# Patient Record
Sex: MALE | Race: WHITE | NOT HISPANIC OR LATINO
[De-identification: names, ages, dates, MRNs, and addresses within clinical notes are randomized per-mention and may not be internally consistent; named-entity substitution may affect disease eponyms.]

---

## 2017-01-31 ENCOUNTER — RX RENEWAL (OUTPATIENT)
Age: 57
End: 2017-01-31

## 2017-03-20 ENCOUNTER — APPOINTMENT (OUTPATIENT)
Dept: INTERNAL MEDICINE | Facility: CLINIC | Age: 57
End: 2017-03-20

## 2017-03-20 ENCOUNTER — LABORATORY RESULT (OUTPATIENT)
Age: 57
End: 2017-03-20

## 2017-03-20 ENCOUNTER — OTHER (OUTPATIENT)
Age: 57
End: 2017-03-20

## 2017-03-20 ENCOUNTER — RESULT CHARGE (OUTPATIENT)
Age: 57
End: 2017-03-20

## 2017-03-20 VITALS — BODY MASS INDEX: 29.78 KG/M2 | WEIGHT: 192 LBS | HEIGHT: 67.5 IN

## 2017-03-20 VITALS — SYSTOLIC BLOOD PRESSURE: 159 MMHG | DIASTOLIC BLOOD PRESSURE: 90 MMHG | HEART RATE: 61 BPM

## 2017-03-21 LAB
ALBUMIN SERPL ELPH-MCNC: 4.6 G/DL
ALP BLD-CCNC: 74 U/L
ALT SERPL-CCNC: 19 U/L
ANION GAP SERPL CALC-SCNC: 20 MMOL/L
AST SERPL-CCNC: 34 U/L
BASOPHILS # BLD AUTO: 0 K/UL
BASOPHILS NFR BLD AUTO: 0 %
BILIRUB SERPL-MCNC: 0.4 MG/DL
BUN SERPL-MCNC: 25 MG/DL
CALCIUM SERPL-MCNC: 9.9 MG/DL
CHLORIDE SERPL-SCNC: 103 MMOL/L
CO2 SERPL-SCNC: 16 MMOL/L
CREAT SERPL-MCNC: 1.27 MG/DL
EOSINOPHIL # BLD AUTO: 0 K/UL
EOSINOPHIL NFR BLD AUTO: 0 %
GLUCOSE SERPL-MCNC: 74 MG/DL
HCT VFR BLD CALC: 45 %
HETEROPH AB SER QL: NEGATIVE
HGB BLD-MCNC: 14.6 G/DL
LYMPHOCYTES # BLD AUTO: 70.27 K/UL
LYMPHOCYTES NFR BLD AUTO: 80 %
MAN DIFF?: YES
MCHC RBC-ENTMCNC: 28.5 PG
MCHC RBC-ENTMCNC: 32.4 GM/DL
MCV RBC AUTO: 87.9 FL
MONOCYTES # BLD AUTO: 0.88 K/UL
MONOCYTES NFR BLD AUTO: 1 %
NEUTROPHILS # BLD AUTO: 12.3 K/UL
NEUTROPHILS NFR BLD AUTO: 14 %
PLATELET # BLD AUTO: 181 K/UL
POTASSIUM SERPL-SCNC: 4.5 MMOL/L
PROT SERPL-MCNC: 7.8 G/DL
RBC # BLD: 5.12 M/UL
RBC # FLD: 14.4 %
SODIUM SERPL-SCNC: 139 MMOL/L
WBC # FLD AUTO: 87.84 K/UL

## 2017-03-22 ENCOUNTER — FORM ENCOUNTER (OUTPATIENT)
Age: 57
End: 2017-03-22

## 2017-03-23 ENCOUNTER — APPOINTMENT (OUTPATIENT)
Dept: CT IMAGING | Facility: CLINIC | Age: 57
End: 2017-03-23

## 2017-03-23 ENCOUNTER — OUTPATIENT (OUTPATIENT)
Dept: OUTPATIENT SERVICES | Facility: HOSPITAL | Age: 57
LOS: 1 days | End: 2017-03-23
Payer: COMMERCIAL

## 2017-03-23 DIAGNOSIS — C91.10 CHRONIC LYMPHOCYTIC LEUKEMIA OF B-CELL TYPE NOT HAVING ACHIEVED REMISSION: ICD-10-CM

## 2017-03-23 PROCEDURE — 74177 CT ABD & PELVIS W/CONTRAST: CPT | Mod: 26

## 2017-03-23 PROCEDURE — 71260 CT THORAX DX C+: CPT

## 2017-03-23 PROCEDURE — 71260 CT THORAX DX C+: CPT | Mod: 26

## 2017-03-23 PROCEDURE — 74177 CT ABD & PELVIS W/CONTRAST: CPT

## 2017-03-30 ENCOUNTER — APPOINTMENT (OUTPATIENT)
Dept: HEMATOLOGY ONCOLOGY | Facility: CLINIC | Age: 57
End: 2017-03-30

## 2017-03-30 VITALS
RESPIRATION RATE: 14 BRPM | DIASTOLIC BLOOD PRESSURE: 82 MMHG | WEIGHT: 195 LBS | SYSTOLIC BLOOD PRESSURE: 138 MMHG | BODY MASS INDEX: 30.09 KG/M2 | OXYGEN SATURATION: 98 % | HEART RATE: 64 BPM | TEMPERATURE: 97.7 F

## 2017-03-31 LAB
ALBUMIN SERPL ELPH-MCNC: 4.5 G/DL
ALP BLD-CCNC: 79 U/L
ALT SERPL-CCNC: 34 U/L
ANION GAP SERPL CALC-SCNC: 14 MMOL/L
APPEARANCE: CLEAR
AST SERPL-CCNC: 36 U/L
B2 MICROGLOB SERPL-MCNC: 4.7 MG/L
BACTERIA: NEGATIVE
BILIRUB SERPL-MCNC: 0.2 MG/DL
BILIRUBIN URINE: NEGATIVE
BLOOD URINE: NEGATIVE
BUN SERPL-MCNC: 24 MG/DL
CALCIUM SERPL-MCNC: 9.6 MG/DL
CHLORIDE SERPL-SCNC: 103 MMOL/L
CO2 SERPL-SCNC: 22 MMOL/L
COLOR: YELLOW
CORE LAB FLUID CYTOLOGY: NORMAL
CREAT SERPL-MCNC: 1.24 MG/DL
DEPRECATED KAPPA LC FREE/LAMBDA SER: 2.01 RATIO
EPO SERPL-MCNC: 8 MIU/ML
GLUCOSE QUALITATIVE U: NORMAL MG/DL
GLUCOSE SERPL-MCNC: 88 MG/DL
IGA SER QL IEP: 215 MG/DL
IGG SER QL IEP: 1410 MG/DL
IGM SER QL IEP: 41 MG/DL
KAPPA LC CSF-MCNC: 1.09 MG/DL
KAPPA LC SERPL-MCNC: 2.19 MG/DL
KETONES URINE: NEGATIVE
LDH SERPL-CCNC: 315 U/L
LEUKOCYTE ESTERASE URINE: NEGATIVE
MICROSCOPIC-UA: NORMAL
NITRITE URINE: NEGATIVE
PH URINE: 6
POTASSIUM SERPL-SCNC: 4.7 MMOL/L
PROT SERPL-MCNC: 7.7 G/DL
PROTEIN URINE: NEGATIVE MG/DL
RED BLOOD CELLS URINE: 2 /HPF
SODIUM SERPL-SCNC: 139 MMOL/L
SPECIFIC GRAVITY URINE: 1.02
SQUAMOUS EPITHELIAL CELLS: 0 /HPF
URATE SERPL-MCNC: 6 MG/DL
UROBILINOGEN URINE: NORMAL MG/DL
WHITE BLOOD CELLS URINE: 0 /HPF

## 2017-04-03 LAB
ALBUMIN MFR SERPL ELPH: 60.4 %
ALBUMIN SERPL-MCNC: 4.7 G/DL
ALBUMIN/GLOB SERPL: 1.6 RATIO
ALPHA1 GLOB MFR SERPL ELPH: 3.8 %
ALPHA1 GLOB SERPL ELPH-MCNC: 0.3 G/DL
ALPHA2 GLOB MFR SERPL ELPH: 8.5 %
ALPHA2 GLOB SERPL ELPH-MCNC: 0.7 G/DL
B-GLOBULIN MFR SERPL ELPH: 10.5 %
B-GLOBULIN SERPL ELPH-MCNC: 0.8 G/DL
GAMMA GLOB FLD ELPH-MCNC: 1.3 G/DL
GAMMA GLOB MFR SERPL ELPH: 16.8 %
INTERPRETATION SERPL IEP-IMP: NORMAL
M PROTEIN SPEC IFE-MCNC: NORMAL
PROT SERPL-MCNC: 7.7 G/DL
PROT SERPL-MCNC: 7.7 G/DL

## 2017-04-04 ENCOUNTER — FORM ENCOUNTER (OUTPATIENT)
Age: 57
End: 2017-04-04

## 2017-04-05 ENCOUNTER — APPOINTMENT (OUTPATIENT)
Dept: MRI IMAGING | Facility: CLINIC | Age: 57
End: 2017-04-05

## 2017-04-05 ENCOUNTER — OUTPATIENT (OUTPATIENT)
Dept: OUTPATIENT SERVICES | Facility: HOSPITAL | Age: 57
LOS: 1 days | End: 2017-04-05

## 2017-04-05 DIAGNOSIS — Z00.8 ENCOUNTER FOR OTHER GENERAL EXAMINATION: ICD-10-CM

## 2017-04-24 ENCOUNTER — APPOINTMENT (OUTPATIENT)
Dept: HEMATOLOGY ONCOLOGY | Facility: CLINIC | Age: 57
End: 2017-04-24

## 2017-04-24 VITALS
HEART RATE: 57 BPM | DIASTOLIC BLOOD PRESSURE: 90 MMHG | SYSTOLIC BLOOD PRESSURE: 140 MMHG | BODY MASS INDEX: 30.55 KG/M2 | WEIGHT: 198 LBS | TEMPERATURE: 97.9 F | OXYGEN SATURATION: 98 % | RESPIRATION RATE: 14 BRPM

## 2017-04-25 LAB
ALBUMIN SERPL ELPH-MCNC: 4.3 G/DL
ALP BLD-CCNC: 78 U/L
ALT SERPL-CCNC: 24 U/L
ANION GAP SERPL CALC-SCNC: 12 MMOL/L
AST SERPL-CCNC: 34 U/L
BILIRUB SERPL-MCNC: 0.3 MG/DL
BUN SERPL-MCNC: 22 MG/DL
CALCIUM SERPL-MCNC: 9.2 MG/DL
CHLORIDE SERPL-SCNC: 103 MMOL/L
CO2 SERPL-SCNC: 23 MMOL/L
CREAT SERPL-MCNC: 1.15 MG/DL
GLUCOSE SERPL-MCNC: 95 MG/DL
LDH SERPL-CCNC: 332 U/L
POTASSIUM SERPL-SCNC: 5 MMOL/L
PROT SERPL-MCNC: 7.5 G/DL
SODIUM SERPL-SCNC: 138 MMOL/L
URATE SERPL-MCNC: 5.9 MG/DL

## 2017-05-08 ENCOUNTER — LABORATORY RESULT (OUTPATIENT)
Age: 57
End: 2017-05-08

## 2017-05-08 ENCOUNTER — APPOINTMENT (OUTPATIENT)
Dept: INTERNAL MEDICINE | Facility: CLINIC | Age: 57
End: 2017-05-08

## 2017-05-08 VITALS
HEART RATE: 70 BPM | DIASTOLIC BLOOD PRESSURE: 92 MMHG | WEIGHT: 192 LBS | SYSTOLIC BLOOD PRESSURE: 155 MMHG | HEIGHT: 67.5 IN | BODY MASS INDEX: 29.78 KG/M2

## 2017-05-08 DIAGNOSIS — Z87.01 PERSONAL HISTORY OF PNEUMONIA (RECURRENT): ICD-10-CM

## 2017-05-08 LAB — S PYO AG SPEC QL IA: NEGATIVE

## 2017-05-09 ENCOUNTER — OTHER (OUTPATIENT)
Age: 57
End: 2017-05-09

## 2017-05-09 LAB
ALBUMIN SERPL ELPH-MCNC: 4.2 G/DL
ALP BLD-CCNC: 90 U/L
ALT SERPL-CCNC: 20 U/L
ANION GAP SERPL CALC-SCNC: 16 MMOL/L
AST SERPL-CCNC: 54 U/L
BASOPHILS # BLD AUTO: 0 K/UL
BASOPHILS NFR BLD AUTO: 0 %
BILIRUB SERPL-MCNC: 0.3 MG/DL
BUN SERPL-MCNC: 22 MG/DL
CALCIUM SERPL-MCNC: 9.7 MG/DL
CHLORIDE SERPL-SCNC: 102 MMOL/L
CO2 SERPL-SCNC: 21 MMOL/L
CREAT SERPL-MCNC: 1.19 MG/DL
EOSINOPHIL # BLD AUTO: 1.22 K/UL
EOSINOPHIL NFR BLD AUTO: 1 %
GLUCOSE SERPL-MCNC: 79 MG/DL
HAV IGG+IGM SER QL: REACTIVE
HAV IGM SER QL: NONREACTIVE
HBV CORE IGM SER QL: NONREACTIVE
HBV SURFACE AB SER QL: REACTIVE
HBV SURFACE AG SER QL: NONREACTIVE
HBV SURFACE AG SER QL: NONREACTIVE
HCT VFR BLD CALC: 44.9 %
HCV AB SER QL: NONREACTIVE
HCV S/CO RATIO: 0.27 S/CO
HGB BLD-MCNC: 14.1 G/DL
LYMPHOCYTES # BLD AUTO: 69.57 K/UL
LYMPHOCYTES NFR BLD AUTO: 57 %
MAN DIFF?: NORMAL
MCHC RBC-ENTMCNC: 28 PG
MCHC RBC-ENTMCNC: 31.4 GM/DL
MCV RBC AUTO: 89.3 FL
MONOCYTES # BLD AUTO: 0 K/UL
MONOCYTES NFR BLD AUTO: 0 %
NEUTROPHILS # BLD AUTO: 7.32 K/UL
NEUTROPHILS NFR BLD AUTO: 6 %
PLATELET # BLD AUTO: 202 K/UL
POTASSIUM SERPL-SCNC: 4.7 MMOL/L
PROT SERPL-MCNC: 7.9 G/DL
RBC # BLD: 5.03 M/UL
RBC # FLD: 14.8 %
SODIUM SERPL-SCNC: 139 MMOL/L
WBC # FLD AUTO: 122.05 K/UL

## 2017-05-10 LAB
HCV RNA SERPL NAA DL=5-ACNC: NOT DETECTED IU/ML
HCV RNA SERPL NAA+PROBE-LOG IU: NOT DETECTED LOGIU/ML

## 2017-05-11 LAB — BACTERIA THROAT CULT: NORMAL

## 2017-05-16 ENCOUNTER — APPOINTMENT (OUTPATIENT)
Dept: INTERNAL MEDICINE | Facility: CLINIC | Age: 57
End: 2017-05-16

## 2017-05-16 ENCOUNTER — LABORATORY RESULT (OUTPATIENT)
Age: 57
End: 2017-05-16

## 2017-05-16 VITALS
HEART RATE: 69 BPM | DIASTOLIC BLOOD PRESSURE: 90 MMHG | HEIGHT: 68 IN | SYSTOLIC BLOOD PRESSURE: 158 MMHG | BODY MASS INDEX: 29.1 KG/M2 | WEIGHT: 192 LBS

## 2017-05-16 LAB — S PYO AG SPEC QL IA: NEGATIVE

## 2017-05-17 LAB
BASOPHILS # BLD AUTO: 0 K/UL
BASOPHILS NFR BLD AUTO: 0 %
EOSINOPHIL # BLD AUTO: 0 K/UL
EOSINOPHIL NFR BLD AUTO: 0 %
HCT VFR BLD CALC: 44.1 %
HGB BLD-MCNC: 13.7 G/DL
LYMPHOCYTES # BLD AUTO: 88.62 K/UL
LYMPHOCYTES NFR BLD AUTO: 71 %
MAN DIFF?: NORMAL
MCHC RBC-ENTMCNC: 27.9 PG
MCHC RBC-ENTMCNC: 31.1 GM/DL
MCV RBC AUTO: 89.8 FL
MONOCYTES # BLD AUTO: 2.5 K/UL
MONOCYTES NFR BLD AUTO: 2 %
NEUTROPHILS # BLD AUTO: 12.48 K/UL
NEUTROPHILS NFR BLD AUTO: 10 %
PLATELET # BLD AUTO: 261 K/UL
RBC # BLD: 4.91 M/UL
RBC # FLD: 15 %
WBC # FLD AUTO: 124.81 K/UL

## 2017-06-27 ENCOUNTER — APPOINTMENT (OUTPATIENT)
Dept: HEMATOLOGY ONCOLOGY | Facility: CLINIC | Age: 57
End: 2017-06-27

## 2017-06-27 VITALS
WEIGHT: 196 LBS | BODY MASS INDEX: 29.8 KG/M2 | RESPIRATION RATE: 14 BRPM | HEART RATE: 62 BPM | OXYGEN SATURATION: 98 % | DIASTOLIC BLOOD PRESSURE: 80 MMHG | TEMPERATURE: 97.7 F | SYSTOLIC BLOOD PRESSURE: 150 MMHG

## 2017-06-27 RX ORDER — AMOXICILLIN 500 MG/1
500 CAPSULE ORAL 3 TIMES DAILY
Qty: 15 | Refills: 1 | Status: DISCONTINUED | COMMUNITY
Start: 2017-05-08 | End: 2017-06-27

## 2017-06-28 LAB
ALBUMIN SERPL ELPH-MCNC: 4.1 G/DL
ALP BLD-CCNC: 83 U/L
ALT SERPL-CCNC: 24 U/L
ANION GAP SERPL CALC-SCNC: 12 MMOL/L
AST SERPL-CCNC: 34 U/L
BILIRUB SERPL-MCNC: 0.3 MG/DL
BUN SERPL-MCNC: 24 MG/DL
CALCIUM SERPL-MCNC: 8.9 MG/DL
CHLORIDE SERPL-SCNC: 105 MMOL/L
CO2 SERPL-SCNC: 22 MMOL/L
CREAT SERPL-MCNC: 1.2 MG/DL
GLUCOSE SERPL-MCNC: 102 MG/DL
LDH SERPL-CCNC: 332 U/L
POTASSIUM SERPL-SCNC: 4.9 MMOL/L
PROT SERPL-MCNC: 7.5 G/DL
SODIUM SERPL-SCNC: 139 MMOL/L
URATE SERPL-MCNC: 6.6 MG/DL

## 2017-07-06 ENCOUNTER — LABORATORY RESULT (OUTPATIENT)
Age: 57
End: 2017-07-06

## 2017-07-06 ENCOUNTER — RESULT REVIEW (OUTPATIENT)
Age: 57
End: 2017-07-06

## 2017-07-06 ENCOUNTER — FORM ENCOUNTER (OUTPATIENT)
Age: 57
End: 2017-07-06

## 2017-07-06 ENCOUNTER — APPOINTMENT (OUTPATIENT)
Dept: HEMATOLOGY ONCOLOGY | Facility: CLINIC | Age: 57
End: 2017-07-06

## 2017-07-06 VITALS
RESPIRATION RATE: 12 BRPM | OXYGEN SATURATION: 98 % | TEMPERATURE: 98 F | WEIGHT: 194 LBS | SYSTOLIC BLOOD PRESSURE: 136 MMHG | BODY MASS INDEX: 29.4 KG/M2 | DIASTOLIC BLOOD PRESSURE: 80 MMHG | HEIGHT: 68 IN

## 2017-07-06 RX ORDER — DOXYCYCLINE HYCLATE 50 MG/1
CAPSULE ORAL
Refills: 0 | Status: DISCONTINUED | COMMUNITY
End: 2017-07-06

## 2017-07-07 ENCOUNTER — APPOINTMENT (OUTPATIENT)
Dept: CT IMAGING | Facility: IMAGING CENTER | Age: 57
End: 2017-07-07

## 2017-07-07 ENCOUNTER — OUTPATIENT (OUTPATIENT)
Dept: OUTPATIENT SERVICES | Facility: HOSPITAL | Age: 57
LOS: 1 days | End: 2017-07-07
Payer: SUBSIDIZED

## 2017-07-07 DIAGNOSIS — C91.10 CHRONIC LYMPHOCYTIC LEUKEMIA OF B-CELL TYPE NOT HAVING ACHIEVED REMISSION: ICD-10-CM

## 2017-07-10 LAB
ALBUMIN SERPL ELPH-MCNC: 4.4 G/DL
ALP BLD-CCNC: 89 U/L
ALT SERPL-CCNC: 19 U/L
ANION GAP SERPL CALC-SCNC: 16 MMOL/L
APTT BLD: 32.2 SEC
AST SERPL-CCNC: 35 U/L
BILIRUB SERPL-MCNC: 0.3 MG/DL
BUN SERPL-MCNC: 23 MG/DL
CALCIUM SERPL-MCNC: 9.4 MG/DL
CHLORIDE SERPL-SCNC: 103 MMOL/L
CO2 SERPL-SCNC: 22 MMOL/L
CREAT SERPL-MCNC: 1.26 MG/DL
GLUCOSE SERPL-MCNC: 90 MG/DL
HAV IGG+IGM SER QL: REACTIVE
HAV IGM SER QL: NONREACTIVE
HBV CORE IGG+IGM SER QL: NONREACTIVE
HBV CORE IGM SER QL: NONREACTIVE
HBV SURFACE AB SER QL: REACTIVE
HBV SURFACE AB SERPL IA-ACNC: 54 MIU/ML
HBV SURFACE AG SER QL: NONREACTIVE
HCV AB SER QL: NONREACTIVE
HCV S/CO RATIO: 0.27 S/CO
INR PPP: 0.93 RATIO
LDH SERPL-CCNC: 397 U/L
MAGNESIUM SERPL-MCNC: 2.3 MG/DL
PHOSPHATE SERPL-MCNC: 4.5 MG/DL
POTASSIUM SERPL-SCNC: 4.7 MMOL/L
PROT SERPL-MCNC: 7.5 G/DL
PT BLD: 10.5 SEC
SODIUM SERPL-SCNC: 141 MMOL/L
URATE SERPL-MCNC: 6.6 MG/DL

## 2017-07-10 PROCEDURE — 85097 BONE MARROW INTERPRETATION: CPT

## 2017-07-10 PROCEDURE — 88342 IMHCHEM/IMCYTCHM 1ST ANTB: CPT

## 2017-07-10 PROCEDURE — 88311 DECALCIFY TISSUE: CPT

## 2017-07-10 PROCEDURE — 88313 SPECIAL STAINS GROUP 2: CPT

## 2017-07-10 PROCEDURE — 88275 CYTOGENETICS 100-300: CPT

## 2017-07-10 PROCEDURE — 74177 CT ABD & PELVIS W/CONTRAST: CPT

## 2017-07-10 PROCEDURE — 88305 TISSUE EXAM BY PATHOLOGIST: CPT

## 2017-07-10 PROCEDURE — 88237 TISSUE CULTURE BONE MARROW: CPT

## 2017-07-10 PROCEDURE — 71260 CT THORAX DX C+: CPT

## 2017-07-10 PROCEDURE — 88271 CYTOGENETICS DNA PROBE: CPT

## 2017-07-10 PROCEDURE — 81264 IGK REARRANGEABN CLONAL POP: CPT

## 2017-07-10 PROCEDURE — 88280 CHROMOSOME KARYOTYPE STUDY: CPT

## 2017-07-10 PROCEDURE — 88264 CHROMOSOME ANALYSIS 20-25: CPT

## 2017-07-10 PROCEDURE — 70491 CT SOFT TISSUE NECK W/DYE: CPT

## 2017-07-10 PROCEDURE — 88341 IMHCHEM/IMCYTCHM EA ADD ANTB: CPT

## 2017-07-10 PROCEDURE — 81261 IGH GENE REARRANGE AMP METH: CPT

## 2017-07-14 ENCOUNTER — APPOINTMENT (OUTPATIENT)
Dept: HEMATOLOGY ONCOLOGY | Facility: CLINIC | Age: 57
End: 2017-07-14

## 2017-07-14 LAB — DNA PLOIDY SPEC FC-IMP: SIGNIFICANT CHANGE UP

## 2017-07-17 ENCOUNTER — APPOINTMENT (OUTPATIENT)
Dept: INTERNAL MEDICINE | Facility: CLINIC | Age: 57
End: 2017-07-17

## 2017-07-17 ENCOUNTER — NON-APPOINTMENT (OUTPATIENT)
Age: 57
End: 2017-07-17

## 2017-07-17 VITALS
HEIGHT: 68 IN | WEIGHT: 188 LBS | DIASTOLIC BLOOD PRESSURE: 91 MMHG | HEART RATE: 55 BPM | BODY MASS INDEX: 28.49 KG/M2 | SYSTOLIC BLOOD PRESSURE: 138 MMHG

## 2017-07-17 LAB
CHROM ANALY INTERPHASE BLD FISH-IMP: SIGNIFICANT CHANGE UP
HBV DNA # SERPL NAA+PROBE: NOT DETECTED
HEPB DNA PCR LOG: NOT DETECTED LOGIU/ML

## 2017-07-20 LAB — CHROM ANALY OVERALL INTERP SPEC-IMP: SIGNIFICANT CHANGE UP

## 2017-07-25 ENCOUNTER — APPOINTMENT (OUTPATIENT)
Dept: HEMATOLOGY ONCOLOGY | Facility: CLINIC | Age: 57
End: 2017-07-25

## 2017-07-25 VITALS
DIASTOLIC BLOOD PRESSURE: 95 MMHG | HEART RATE: 69 BPM | SYSTOLIC BLOOD PRESSURE: 145 MMHG | TEMPERATURE: 98.1 F | RESPIRATION RATE: 12 BRPM | BODY MASS INDEX: 29.19 KG/M2 | WEIGHT: 192 LBS | OXYGEN SATURATION: 99 %

## 2017-07-26 LAB
ALBUMIN SERPL ELPH-MCNC: 4.5 G/DL
ALP BLD-CCNC: 101 U/L
ALT SERPL-CCNC: 19 U/L
ANION GAP SERPL CALC-SCNC: 17 MMOL/L
AST SERPL-CCNC: 30 U/L
BILIRUB SERPL-MCNC: 0.4 MG/DL
BUN SERPL-MCNC: 19 MG/DL
CALCIUM SERPL-MCNC: 10 MG/DL
CHLORIDE SERPL-SCNC: 97 MMOL/L
CO2 SERPL-SCNC: 23 MMOL/L
CREAT SERPL-MCNC: 1.36 MG/DL
GLUCOSE SERPL-MCNC: 98 MG/DL
LDH SERPL-CCNC: 354 U/L
MAGNESIUM SERPL-MCNC: 2.1 MG/DL
PHOSPHATE SERPL-MCNC: 3.7 MG/DL
POTASSIUM SERPL-SCNC: 4.8 MMOL/L
PROT SERPL-MCNC: 7.7 G/DL
SODIUM SERPL-SCNC: 137 MMOL/L
URATE SERPL-MCNC: 6.6 MG/DL

## 2017-08-01 ENCOUNTER — APPOINTMENT (OUTPATIENT)
Dept: HEMATOLOGY ONCOLOGY | Facility: CLINIC | Age: 57
End: 2017-08-01
Payer: COMMERCIAL

## 2017-08-01 VITALS
BODY MASS INDEX: 28.89 KG/M2 | WEIGHT: 190 LBS | HEART RATE: 67 BPM | OXYGEN SATURATION: 98 % | TEMPERATURE: 98 F | DIASTOLIC BLOOD PRESSURE: 80 MMHG | SYSTOLIC BLOOD PRESSURE: 140 MMHG | RESPIRATION RATE: 14 BRPM

## 2017-08-01 PROCEDURE — 85025 COMPLETE CBC W/AUTO DIFF WBC: CPT

## 2017-08-01 PROCEDURE — 99215 OFFICE O/P EST HI 40 MIN: CPT

## 2017-08-02 LAB
ALBUMIN SERPL ELPH-MCNC: 4.4 G/DL
ALP BLD-CCNC: 88 U/L
ALT SERPL-CCNC: 13 U/L
ANION GAP SERPL CALC-SCNC: 14 MMOL/L
AST SERPL-CCNC: 22 U/L
BILIRUB SERPL-MCNC: 0.5 MG/DL
BUN SERPL-MCNC: 25 MG/DL
CALCIUM SERPL-MCNC: 9.7 MG/DL
CHLORIDE SERPL-SCNC: 101 MMOL/L
CO2 SERPL-SCNC: 20 MMOL/L
CREAT SERPL-MCNC: 1.23 MG/DL
GLUCOSE SERPL-MCNC: 100 MG/DL
LDH SERPL-CCNC: 230 U/L
MAGNESIUM SERPL-MCNC: 2.1 MG/DL
PHOSPHATE SERPL-MCNC: 4 MG/DL
POTASSIUM SERPL-SCNC: 4.7 MMOL/L
PROT SERPL-MCNC: 7.8 G/DL
SODIUM SERPL-SCNC: 135 MMOL/L
URATE SERPL-MCNC: 9 MG/DL

## 2017-08-03 LAB — BACTERIA THROAT CULT: NORMAL

## 2017-08-09 ENCOUNTER — APPOINTMENT (OUTPATIENT)
Dept: HEMATOLOGY ONCOLOGY | Facility: CLINIC | Age: 57
End: 2017-08-09
Payer: COMMERCIAL

## 2017-08-09 PROCEDURE — 99215 OFFICE O/P EST HI 40 MIN: CPT

## 2017-08-09 PROCEDURE — 85027 COMPLETE CBC AUTOMATED: CPT

## 2017-08-10 LAB
ALBUMIN SERPL ELPH-MCNC: 4.4 G/DL
ALP BLD-CCNC: 65 U/L
ALT SERPL-CCNC: 11 U/L
ANION GAP SERPL CALC-SCNC: 13 MMOL/L
AST SERPL-CCNC: 23 U/L
BILIRUB SERPL-MCNC: 0.6 MG/DL
BUN SERPL-MCNC: 19 MG/DL
CALCIUM SERPL-MCNC: 9.5 MG/DL
CHLORIDE SERPL-SCNC: 102 MMOL/L
CO2 SERPL-SCNC: 24 MMOL/L
CREAT SERPL-MCNC: 1.23 MG/DL
GLUCOSE SERPL-MCNC: 96 MG/DL
LDH SERPL-CCNC: 179 U/L
MAGNESIUM SERPL-MCNC: 2 MG/DL
PHOSPHATE SERPL-MCNC: 3.3 MG/DL
POTASSIUM SERPL-SCNC: 4.7 MMOL/L
PROT SERPL-MCNC: 7.6 G/DL
SODIUM SERPL-SCNC: 139 MMOL/L
URATE SERPL-MCNC: 5.8 MG/DL

## 2017-08-11 VITALS
WEIGHT: 191 LBS | DIASTOLIC BLOOD PRESSURE: 84 MMHG | BODY MASS INDEX: 29.04 KG/M2 | RESPIRATION RATE: 12 BRPM | TEMPERATURE: 98.4 F | SYSTOLIC BLOOD PRESSURE: 124 MMHG | OXYGEN SATURATION: 98 % | HEART RATE: 56 BPM

## 2017-08-15 ENCOUNTER — APPOINTMENT (OUTPATIENT)
Dept: HEMATOLOGY ONCOLOGY | Facility: CLINIC | Age: 57
End: 2017-08-15
Payer: COMMERCIAL

## 2017-08-15 VITALS
HEART RATE: 66 BPM | DIASTOLIC BLOOD PRESSURE: 84 MMHG | TEMPERATURE: 97.8 F | SYSTOLIC BLOOD PRESSURE: 130 MMHG | RESPIRATION RATE: 14 BRPM | WEIGHT: 190 LBS | OXYGEN SATURATION: 98 % | BODY MASS INDEX: 28.89 KG/M2

## 2017-08-15 PROCEDURE — 85025 COMPLETE CBC W/AUTO DIFF WBC: CPT

## 2017-08-15 PROCEDURE — 99215 OFFICE O/P EST HI 40 MIN: CPT

## 2017-08-15 RX ORDER — VALACYCLOVIR 500 MG/1
500 TABLET, FILM COATED ORAL DAILY
Qty: 30 | Refills: 5 | Status: DISCONTINUED | COMMUNITY
Start: 2017-07-25 | End: 2017-08-15

## 2017-08-16 LAB
ALBUMIN SERPL ELPH-MCNC: 4.5 G/DL
ALP BLD-CCNC: 65 U/L
ALT SERPL-CCNC: 15 U/L
ANION GAP SERPL CALC-SCNC: 16 MMOL/L
AST SERPL-CCNC: 20 U/L
BILIRUB SERPL-MCNC: 0.5 MG/DL
BUN SERPL-MCNC: 17 MG/DL
CALCIUM SERPL-MCNC: 9.7 MG/DL
CHLORIDE SERPL-SCNC: 99 MMOL/L
CO2 SERPL-SCNC: 23 MMOL/L
CREAT SERPL-MCNC: 1.21 MG/DL
GLUCOSE SERPL-MCNC: 102 MG/DL
LDH SERPL-CCNC: 168 U/L
MAGNESIUM SERPL-MCNC: 2.1 MG/DL
PHOSPHATE SERPL-MCNC: 3.1 MG/DL
POTASSIUM SERPL-SCNC: 4.3 MMOL/L
PROT SERPL-MCNC: 7.7 G/DL
SODIUM SERPL-SCNC: 138 MMOL/L
URATE SERPL-MCNC: 5.4 MG/DL

## 2017-08-17 RX ORDER — ALLOPURINOL 100 MG/1
100 TABLET ORAL DAILY
Qty: 60 | Refills: 1 | Status: DISCONTINUED | COMMUNITY
Start: 2017-08-02 | End: 2017-08-17

## 2017-08-22 ENCOUNTER — APPOINTMENT (OUTPATIENT)
Dept: HEMATOLOGY ONCOLOGY | Facility: CLINIC | Age: 57
End: 2017-08-22
Payer: COMMERCIAL

## 2017-08-22 VITALS
OXYGEN SATURATION: 99 % | SYSTOLIC BLOOD PRESSURE: 120 MMHG | TEMPERATURE: 97.7 F | WEIGHT: 193 LBS | BODY MASS INDEX: 29.35 KG/M2 | RESPIRATION RATE: 14 BRPM | DIASTOLIC BLOOD PRESSURE: 80 MMHG | HEART RATE: 62 BPM

## 2017-08-22 PROCEDURE — 99215 OFFICE O/P EST HI 40 MIN: CPT

## 2017-08-22 PROCEDURE — 85025 COMPLETE CBC W/AUTO DIFF WBC: CPT

## 2017-08-23 LAB
ALBUMIN SERPL ELPH-MCNC: 4.5 G/DL
ALP BLD-CCNC: 65 U/L
ALT SERPL-CCNC: 14 U/L
ANION GAP SERPL CALC-SCNC: 14 MMOL/L
AST SERPL-CCNC: 17 U/L
BILIRUB SERPL-MCNC: 0.3 MG/DL
BUN SERPL-MCNC: 22 MG/DL
CALCIUM SERPL-MCNC: 9.9 MG/DL
CHLORIDE SERPL-SCNC: 99 MMOL/L
CO2 SERPL-SCNC: 25 MMOL/L
CREAT SERPL-MCNC: 1.17 MG/DL
GLUCOSE SERPL-MCNC: 103 MG/DL
LDH SERPL-CCNC: 163 U/L
MAGNESIUM SERPL-MCNC: 2 MG/DL
PHOSPHATE SERPL-MCNC: 3.5 MG/DL
POTASSIUM SERPL-SCNC: 4.5 MMOL/L
PROT SERPL-MCNC: 7.9 G/DL
SODIUM SERPL-SCNC: 138 MMOL/L
URATE SERPL-MCNC: 5.4 MG/DL

## 2017-09-18 ENCOUNTER — APPOINTMENT (OUTPATIENT)
Dept: HEMATOLOGY ONCOLOGY | Facility: CLINIC | Age: 57
End: 2017-09-18
Payer: COMMERCIAL

## 2017-09-18 ENCOUNTER — APPOINTMENT (OUTPATIENT)
Dept: HEMATOLOGY ONCOLOGY | Facility: CLINIC | Age: 57
End: 2017-09-18

## 2017-09-18 VITALS
WEIGHT: 193 LBS | RESPIRATION RATE: 14 BRPM | TEMPERATURE: 97.7 F | SYSTOLIC BLOOD PRESSURE: 138 MMHG | OXYGEN SATURATION: 99 % | BODY MASS INDEX: 29.35 KG/M2 | HEART RATE: 59 BPM | DIASTOLIC BLOOD PRESSURE: 88 MMHG

## 2017-09-18 PROCEDURE — 99215 OFFICE O/P EST HI 40 MIN: CPT

## 2017-09-18 PROCEDURE — 85025 COMPLETE CBC W/AUTO DIFF WBC: CPT

## 2017-09-19 LAB
ALBUMIN SERPL ELPH-MCNC: 4.5 G/DL
ALP BLD-CCNC: 49 U/L
ALT SERPL-CCNC: 18 U/L
ANION GAP SERPL CALC-SCNC: 15 MMOL/L
AST SERPL-CCNC: 25 U/L
BILIRUB SERPL-MCNC: 0.4 MG/DL
BUN SERPL-MCNC: 16 MG/DL
CALCIUM SERPL-MCNC: 9.5 MG/DL
CHLORIDE SERPL-SCNC: 101 MMOL/L
CO2 SERPL-SCNC: 24 MMOL/L
CREAT SERPL-MCNC: 1.12 MG/DL
GLUCOSE SERPL-MCNC: 84 MG/DL
LDH SERPL-CCNC: 184 U/L
MAGNESIUM SERPL-MCNC: 2.2 MG/DL
PHOSPHATE SERPL-MCNC: 3.2 MG/DL
POTASSIUM SERPL-SCNC: 4.6 MMOL/L
PROT SERPL-MCNC: 8 G/DL
SODIUM SERPL-SCNC: 140 MMOL/L
URATE SERPL-MCNC: 6.6 MG/DL

## 2017-10-12 ENCOUNTER — FORM ENCOUNTER (OUTPATIENT)
Age: 57
End: 2017-10-12

## 2017-10-13 ENCOUNTER — APPOINTMENT (OUTPATIENT)
Dept: CT IMAGING | Facility: IMAGING CENTER | Age: 57
End: 2017-10-13
Payer: COMMERCIAL

## 2017-10-13 ENCOUNTER — OUTPATIENT (OUTPATIENT)
Dept: OUTPATIENT SERVICES | Facility: HOSPITAL | Age: 57
LOS: 1 days | End: 2017-10-13
Payer: COMMERCIAL

## 2017-10-13 DIAGNOSIS — C91.10 CHRONIC LYMPHOCYTIC LEUKEMIA OF B-CELL TYPE NOT HAVING ACHIEVED REMISSION: ICD-10-CM

## 2017-10-13 PROCEDURE — 74177 CT ABD & PELVIS W/CONTRAST: CPT | Mod: 26

## 2017-10-13 PROCEDURE — 70491 CT SOFT TISSUE NECK W/DYE: CPT

## 2017-10-13 PROCEDURE — 70491 CT SOFT TISSUE NECK W/DYE: CPT | Mod: 26

## 2017-10-13 PROCEDURE — 71260 CT THORAX DX C+: CPT | Mod: 26

## 2017-10-13 PROCEDURE — 71260 CT THORAX DX C+: CPT

## 2017-10-13 PROCEDURE — 74177 CT ABD & PELVIS W/CONTRAST: CPT

## 2017-10-17 ENCOUNTER — APPOINTMENT (OUTPATIENT)
Dept: HEMATOLOGY ONCOLOGY | Facility: CLINIC | Age: 57
End: 2017-10-17
Payer: COMMERCIAL

## 2017-10-17 PROCEDURE — 36415 COLL VENOUS BLD VENIPUNCTURE: CPT

## 2017-10-18 DIAGNOSIS — K40.90 UNILATERAL INGUINAL HERNIA, WITHOUT OBSTRUCTION OR GANGRENE, NOT SPECIFIED AS RECURRENT: ICD-10-CM

## 2017-10-18 DIAGNOSIS — R59.0 LOCALIZED ENLARGED LYMPH NODES: ICD-10-CM

## 2017-10-18 DIAGNOSIS — C91.10 CHRONIC LYMPHOCYTIC LEUKEMIA OF B-CELL TYPE NOT HAVING ACHIEVED REMISSION: ICD-10-CM

## 2017-10-18 LAB
ALBUMIN SERPL ELPH-MCNC: 4.7 G/DL
ALP BLD-CCNC: 51 U/L
ALT SERPL-CCNC: 25 U/L
ANION GAP SERPL CALC-SCNC: 13 MMOL/L
AST SERPL-CCNC: 29 U/L
BILIRUB SERPL-MCNC: 0.4 MG/DL
BUN SERPL-MCNC: 19 MG/DL
CALCIUM SERPL-MCNC: 9.9 MG/DL
CHLORIDE SERPL-SCNC: 100 MMOL/L
CO2 SERPL-SCNC: 26 MMOL/L
CREAT SERPL-MCNC: 1.23 MG/DL
GLUCOSE SERPL-MCNC: 88 MG/DL
LDH SERPL-CCNC: 211 U/L
MAGNESIUM SERPL-MCNC: 2.3 MG/DL
PHOSPHATE SERPL-MCNC: 3.4 MG/DL
POTASSIUM SERPL-SCNC: 4.7 MMOL/L
PROT SERPL-MCNC: 8.2 G/DL
SODIUM SERPL-SCNC: 139 MMOL/L
URATE SERPL-MCNC: 5.8 MG/DL

## 2017-10-19 ENCOUNTER — APPOINTMENT (OUTPATIENT)
Dept: HEMATOLOGY ONCOLOGY | Facility: CLINIC | Age: 57
End: 2017-10-19
Payer: COMMERCIAL

## 2017-10-19 ENCOUNTER — LABORATORY RESULT (OUTPATIENT)
Age: 57
End: 2017-10-19

## 2017-10-19 VITALS
DIASTOLIC BLOOD PRESSURE: 80 MMHG | TEMPERATURE: 97.7 F | SYSTOLIC BLOOD PRESSURE: 120 MMHG | OXYGEN SATURATION: 99 % | HEART RATE: 62 BPM | RESPIRATION RATE: 14 BRPM | WEIGHT: 201 LBS | BODY MASS INDEX: 30.56 KG/M2

## 2017-10-19 PROBLEM — E83.39 HYPERPHOSPHATEMIA: Status: ACTIVE | Noted: 2017-10-19

## 2017-10-19 LAB
ALBUMIN SERPL ELPH-MCNC: 4.3 G/DL
ALBUMIN SERPL ELPH-MCNC: 4.4 G/DL
ALP BLD-CCNC: 49 U/L
ALP BLD-CCNC: 57 U/L
ALT SERPL-CCNC: 24 U/L
ALT SERPL-CCNC: 25 U/L
ANION GAP SERPL CALC-SCNC: 12 MMOL/L
ANION GAP SERPL CALC-SCNC: 13 MMOL/L
AST SERPL-CCNC: 22 U/L
AST SERPL-CCNC: 25 U/L
BILIRUB SERPL-MCNC: 0.2 MG/DL
BILIRUB SERPL-MCNC: 0.2 MG/DL
BUN SERPL-MCNC: 22 MG/DL
BUN SERPL-MCNC: 23 MG/DL
CALCIUM SERPL-MCNC: 9.4 MG/DL
CALCIUM SERPL-MCNC: 9.8 MG/DL
CHLORIDE SERPL-SCNC: 101 MMOL/L
CHLORIDE SERPL-SCNC: 104 MMOL/L
CO2 SERPL-SCNC: 23 MMOL/L
CO2 SERPL-SCNC: 24 MMOL/L
CREAT SERPL-MCNC: 1.23 MG/DL
CREAT SERPL-MCNC: 1.27 MG/DL
GLUCOSE SERPL-MCNC: 110 MG/DL
GLUCOSE SERPL-MCNC: 89 MG/DL
LDH SERPL-CCNC: 160 U/L
LDH SERPL-CCNC: 172 U/L
MAGNESIUM SERPL-MCNC: 2 MG/DL
MAGNESIUM SERPL-MCNC: 2.2 MG/DL
PHOSPHATE SERPL-MCNC: 3.9 MG/DL
PHOSPHATE SERPL-MCNC: 5.6 MG/DL
POTASSIUM SERPL-SCNC: 4.5 MMOL/L
POTASSIUM SERPL-SCNC: 4.6 MMOL/L
PROT SERPL-MCNC: 7.5 G/DL
PROT SERPL-MCNC: 7.7 G/DL
SODIUM SERPL-SCNC: 137 MMOL/L
SODIUM SERPL-SCNC: 140 MMOL/L
URATE SERPL-MCNC: 4.6 MG/DL
URATE SERPL-MCNC: 5 MG/DL

## 2017-10-19 PROCEDURE — 85025 COMPLETE CBC W/AUTO DIFF WBC: CPT

## 2017-10-19 PROCEDURE — 99215 OFFICE O/P EST HI 40 MIN: CPT

## 2017-10-19 RX ORDER — ONDANSETRON 4 MG/1
4 TABLET ORAL
Qty: 30 | Refills: 5 | Status: DISCONTINUED | COMMUNITY
Start: 2017-07-25 | End: 2017-10-19

## 2017-10-20 ENCOUNTER — APPOINTMENT (OUTPATIENT)
Dept: HEMATOLOGY ONCOLOGY | Facility: CLINIC | Age: 57
End: 2017-10-20
Payer: SUBSIDIZED

## 2017-10-20 VITALS
WEIGHT: 200 LBS | OXYGEN SATURATION: 98 % | DIASTOLIC BLOOD PRESSURE: 86 MMHG | TEMPERATURE: 97.8 F | HEART RATE: 63 BPM | BODY MASS INDEX: 30.41 KG/M2 | SYSTOLIC BLOOD PRESSURE: 146 MMHG | RESPIRATION RATE: 14 BRPM

## 2017-10-20 LAB
ALBUMIN SERPL ELPH-MCNC: 4.3 G/DL
ALP BLD-CCNC: 46 U/L
ALT SERPL-CCNC: 20 U/L
ANION GAP SERPL CALC-SCNC: 12 MMOL/L
AST SERPL-CCNC: 20 U/L
BASOPHILS # BLD AUTO: 0.12 K/UL
BASOPHILS NFR BLD AUTO: 0.2 %
BILIRUB SERPL-MCNC: 0.4 MG/DL
BUN SERPL-MCNC: 20 MG/DL
CALCIUM SERPL-MCNC: 9.6 MG/DL
CHLORIDE SERPL-SCNC: 102 MMOL/L
CO2 SERPL-SCNC: 27 MMOL/L
CREAT SERPL-MCNC: 1.24 MG/DL
EOSINOPHIL # BLD AUTO: 0.19 K/UL
EOSINOPHIL NFR BLD AUTO: 0.3 %
GLUCOSE SERPL-MCNC: 100 MG/DL
HCT VFR BLD CALC: 42 %
HGB BLD-MCNC: 13.6 G/DL
IMM GRANULOCYTES NFR BLD AUTO: 0.1 %
LDH SERPL-CCNC: 177 U/L
LYMPHOCYTES # BLD AUTO: 47.78 K/UL
LYMPHOCYTES NFR BLD AUTO: 87.5 %
MAGNESIUM SERPL-MCNC: 2.1 MG/DL
MAN DIFF?: NORMAL
MCHC RBC-ENTMCNC: 27.8 PG
MCHC RBC-ENTMCNC: 32.4 GM/DL
MCV RBC AUTO: 85.7 FL
MONOCYTES # BLD AUTO: 1.18 K/UL
MONOCYTES NFR BLD AUTO: 2.2 %
NEUTROPHILS # BLD AUTO: 5.26 K/UL
NEUTROPHILS NFR BLD AUTO: 9.7 %
PHOSPHATE SERPL-MCNC: 3.1 MG/DL
PLATELET # BLD AUTO: 127 K/UL
POTASSIUM SERPL-SCNC: 4.4 MMOL/L
PROT SERPL-MCNC: 7.7 G/DL
RBC # BLD: 4.9 M/UL
RBC # FLD: 14.2 %
SODIUM SERPL-SCNC: 141 MMOL/L
URATE SERPL-MCNC: 5.6 MG/DL
WBC # FLD AUTO: 54.6 K/UL

## 2017-10-20 PROCEDURE — 99215 OFFICE O/P EST HI 40 MIN: CPT

## 2017-10-20 PROCEDURE — 85025 COMPLETE CBC W/AUTO DIFF WBC: CPT

## 2017-10-26 ENCOUNTER — APPOINTMENT (OUTPATIENT)
Dept: HEMATOLOGY ONCOLOGY | Facility: CLINIC | Age: 57
End: 2017-10-26

## 2017-10-26 DIAGNOSIS — E83.39 OTHER DISORDERS OF PHOSPHORUS METABOLISM: ICD-10-CM

## 2017-10-27 ENCOUNTER — APPOINTMENT (OUTPATIENT)
Dept: HEMATOLOGY ONCOLOGY | Facility: CLINIC | Age: 57
End: 2017-10-27
Payer: COMMERCIAL

## 2017-10-30 ENCOUNTER — APPOINTMENT (OUTPATIENT)
Dept: HEMATOLOGY ONCOLOGY | Facility: CLINIC | Age: 57
End: 2017-10-30
Payer: COMMERCIAL

## 2017-10-30 ENCOUNTER — APPOINTMENT (OUTPATIENT)
Dept: HEMATOLOGY ONCOLOGY | Facility: CLINIC | Age: 57
End: 2017-10-30

## 2017-10-30 LAB
ALBUMIN SERPL ELPH-MCNC: 4.4 G/DL
ALP BLD-CCNC: 50 U/L
ALT SERPL-CCNC: 17 U/L
ANION GAP SERPL CALC-SCNC: 14 MMOL/L
AST SERPL-CCNC: 21 U/L
BILIRUB SERPL-MCNC: 0.4 MG/DL
BUN SERPL-MCNC: 17 MG/DL
CALCIUM SERPL-MCNC: 9.9 MG/DL
CHLORIDE SERPL-SCNC: 99 MMOL/L
CO2 SERPL-SCNC: 24 MMOL/L
CREAT SERPL-MCNC: 1.23 MG/DL
GLUCOSE SERPL-MCNC: 92 MG/DL
LDH SERPL-CCNC: 171 U/L
MAGNESIUM SERPL-MCNC: 2 MG/DL
PHOSPHATE SERPL-MCNC: 3.2 MG/DL
POTASSIUM SERPL-SCNC: 4.3 MMOL/L
PROT SERPL-MCNC: 7.7 G/DL
SODIUM SERPL-SCNC: 137 MMOL/L
URATE SERPL-MCNC: 5.4 MG/DL

## 2017-10-30 PROCEDURE — 36415 COLL VENOUS BLD VENIPUNCTURE: CPT

## 2017-10-31 ENCOUNTER — APPOINTMENT (OUTPATIENT)
Dept: HEMATOLOGY ONCOLOGY | Facility: CLINIC | Age: 57
End: 2017-10-31
Payer: COMMERCIAL

## 2017-10-31 VITALS
RESPIRATION RATE: 14 BRPM | BODY MASS INDEX: 30.87 KG/M2 | HEART RATE: 62 BPM | OXYGEN SATURATION: 99 % | TEMPERATURE: 97.6 F | DIASTOLIC BLOOD PRESSURE: 90 MMHG | SYSTOLIC BLOOD PRESSURE: 130 MMHG | WEIGHT: 203 LBS

## 2017-10-31 LAB
ALBUMIN SERPL ELPH-MCNC: 4.4 G/DL
ALP BLD-CCNC: 47 U/L
ALT SERPL-CCNC: 16 U/L
ANION GAP SERPL CALC-SCNC: 14 MMOL/L
AST SERPL-CCNC: 22 U/L
BILIRUB SERPL-MCNC: 0.3 MG/DL
BUN SERPL-MCNC: 19 MG/DL
CALCIUM SERPL-MCNC: 9.4 MG/DL
CHLORIDE SERPL-SCNC: 99 MMOL/L
CO2 SERPL-SCNC: 25 MMOL/L
CREAT SERPL-MCNC: 1.15 MG/DL
GLUCOSE SERPL-MCNC: 84 MG/DL
LDH SERPL-CCNC: 164 U/L
MAGNESIUM SERPL-MCNC: 2.2 MG/DL
PHOSPHATE SERPL-MCNC: 3.7 MG/DL
POTASSIUM SERPL-SCNC: 4.3 MMOL/L
PROT SERPL-MCNC: 7.7 G/DL
SODIUM SERPL-SCNC: 138 MMOL/L
URATE SERPL-MCNC: 5.5 MG/DL

## 2017-10-31 PROCEDURE — 99215 OFFICE O/P EST HI 40 MIN: CPT

## 2017-10-31 PROCEDURE — 85025 COMPLETE CBC W/AUTO DIFF WBC: CPT

## 2017-11-01 ENCOUNTER — LABORATORY RESULT (OUTPATIENT)
Age: 57
End: 2017-11-01

## 2017-11-01 ENCOUNTER — APPOINTMENT (OUTPATIENT)
Dept: HEMATOLOGY ONCOLOGY | Facility: CLINIC | Age: 57
End: 2017-11-01
Payer: SUBSIDIZED

## 2017-11-01 VITALS
RESPIRATION RATE: 14 BRPM | TEMPERATURE: 97.7 F | OXYGEN SATURATION: 99 % | BODY MASS INDEX: 30.87 KG/M2 | HEART RATE: 60 BPM | WEIGHT: 203 LBS

## 2017-11-01 VITALS — SYSTOLIC BLOOD PRESSURE: 130 MMHG | DIASTOLIC BLOOD PRESSURE: 92 MMHG

## 2017-11-01 LAB
ALBUMIN SERPL ELPH-MCNC: 4.3 G/DL
ALP BLD-CCNC: 52 U/L
ALT SERPL-CCNC: 15 U/L
ANION GAP SERPL CALC-SCNC: 15 MMOL/L
AST SERPL-CCNC: 20 U/L
BILIRUB SERPL-MCNC: 0.4 MG/DL
BUN SERPL-MCNC: 22 MG/DL
CALCIUM SERPL-MCNC: 9.5 MG/DL
CHLORIDE SERPL-SCNC: 102 MMOL/L
CO2 SERPL-SCNC: 24 MMOL/L
CREAT SERPL-MCNC: 1.28 MG/DL
GLUCOSE SERPL-MCNC: 90 MG/DL
LDH SERPL-CCNC: 170 U/L
MAGNESIUM SERPL-MCNC: 2.1 MG/DL
PHOSPHATE SERPL-MCNC: 3.9 MG/DL
POTASSIUM SERPL-SCNC: 4.6 MMOL/L
PROT SERPL-MCNC: 7.5 G/DL
SODIUM SERPL-SCNC: 141 MMOL/L
URATE SERPL-MCNC: 5.9 MG/DL

## 2017-11-01 PROCEDURE — 99215 OFFICE O/P EST HI 40 MIN: CPT

## 2017-11-01 PROCEDURE — 85025 COMPLETE CBC W/AUTO DIFF WBC: CPT

## 2017-11-06 ENCOUNTER — APPOINTMENT (OUTPATIENT)
Dept: HEMATOLOGY ONCOLOGY | Facility: CLINIC | Age: 57
End: 2017-11-06

## 2017-11-06 ENCOUNTER — APPOINTMENT (OUTPATIENT)
Dept: INTERNAL MEDICINE | Facility: CLINIC | Age: 57
End: 2017-11-06
Payer: COMMERCIAL

## 2017-11-06 VITALS
DIASTOLIC BLOOD PRESSURE: 95 MMHG | HEIGHT: 68 IN | HEART RATE: 66 BPM | BODY MASS INDEX: 30.01 KG/M2 | SYSTOLIC BLOOD PRESSURE: 155 MMHG | WEIGHT: 198 LBS

## 2017-11-06 PROCEDURE — 99213 OFFICE O/P EST LOW 20 MIN: CPT

## 2017-11-07 ENCOUNTER — APPOINTMENT (OUTPATIENT)
Dept: HEMATOLOGY ONCOLOGY | Facility: CLINIC | Age: 57
End: 2017-11-07

## 2017-11-07 ENCOUNTER — APPOINTMENT (OUTPATIENT)
Dept: INTERNAL MEDICINE | Facility: CLINIC | Age: 57
End: 2017-11-07
Payer: COMMERCIAL

## 2017-11-08 ENCOUNTER — APPOINTMENT (OUTPATIENT)
Dept: INTERNAL MEDICINE | Facility: CLINIC | Age: 57
End: 2017-11-08

## 2017-11-09 LAB
APPEARANCE: CLEAR
BACTERIA: NEGATIVE
BILIRUBIN URINE: NEGATIVE
BLOOD URINE: NEGATIVE
COLOR: YELLOW
GLUCOSE QUALITATIVE U: NEGATIVE MG/DL
KETONES URINE: NEGATIVE
LEUKOCYTE ESTERASE URINE: NEGATIVE
MICROSCOPIC-UA: NORMAL
NITRITE URINE: NEGATIVE
PH URINE: 6.5
PROTEIN URINE: NEGATIVE MG/DL
RED BLOOD CELLS URINE: 0 /HPF
SPECIFIC GRAVITY URINE: 1.01
SQUAMOUS EPITHELIAL CELLS: 0 /HPF
UROBILINOGEN URINE: NEGATIVE MG/DL
WHITE BLOOD CELLS URINE: 0 /HPF

## 2017-11-10 LAB — BACTERIA SPEC CULT: ABNORMAL

## 2017-11-13 ENCOUNTER — APPOINTMENT (OUTPATIENT)
Dept: HEMATOLOGY ONCOLOGY | Facility: CLINIC | Age: 57
End: 2017-11-13
Payer: COMMERCIAL

## 2017-11-13 VITALS
HEART RATE: 71 BPM | OXYGEN SATURATION: 98 % | SYSTOLIC BLOOD PRESSURE: 138 MMHG | RESPIRATION RATE: 14 BRPM | DIASTOLIC BLOOD PRESSURE: 88 MMHG | BODY MASS INDEX: 30.71 KG/M2 | WEIGHT: 202 LBS | TEMPERATURE: 98.1 F

## 2017-11-13 PROCEDURE — 99215 OFFICE O/P EST HI 40 MIN: CPT

## 2017-11-13 PROCEDURE — 85025 COMPLETE CBC W/AUTO DIFF WBC: CPT

## 2017-11-13 RX ORDER — SEVELAMER CARBONATE 800 MG/1
800 TABLET, FILM COATED ORAL
Qty: 90 | Refills: 0 | Status: DISCONTINUED | COMMUNITY
Start: 2017-10-19 | End: 2017-11-13

## 2017-11-14 ENCOUNTER — EMERGENCY (EMERGENCY)
Facility: HOSPITAL | Age: 57
LOS: 1 days | Discharge: HOME | End: 2017-11-14
Attending: EMERGENCY MEDICINE
Payer: COMMERCIAL

## 2017-11-14 ENCOUNTER — APPOINTMENT (OUTPATIENT)
Dept: HEMATOLOGY ONCOLOGY | Facility: CLINIC | Age: 57
End: 2017-11-14
Payer: COMMERCIAL

## 2017-11-14 VITALS
TEMPERATURE: 98.1 F | WEIGHT: 202 LBS | SYSTOLIC BLOOD PRESSURE: 120 MMHG | HEART RATE: 72 BPM | RESPIRATION RATE: 14 BRPM | DIASTOLIC BLOOD PRESSURE: 84 MMHG | OXYGEN SATURATION: 98 % | BODY MASS INDEX: 30.71 KG/M2

## 2017-11-14 VITALS — WEIGHT: 199.96 LBS | HEIGHT: 68 IN

## 2017-11-14 VITALS
HEART RATE: 72 BPM | SYSTOLIC BLOOD PRESSURE: 165 MMHG | DIASTOLIC BLOOD PRESSURE: 97 MMHG | TEMPERATURE: 97 F | RESPIRATION RATE: 18 BRPM | OXYGEN SATURATION: 100 %

## 2017-11-14 LAB
ALBUMIN SERPL ELPH-MCNC: 4 G/DL
ALP BLD-CCNC: 67 U/L
ALT SERPL-CCNC: 30 U/L
ANION GAP SERPL CALC-SCNC: 18 MMOL/L
AST SERPL-CCNC: 29 U/L
BILIRUB SERPL-MCNC: 0.3 MG/DL
BUN SERPL-MCNC: 12 MG/DL
CALCIUM SERPL-MCNC: 9.4 MG/DL
CHLORIDE SERPL-SCNC: 100 MMOL/L
CO2 SERPL-SCNC: 22 MMOL/L
CREAT SERPL-MCNC: 1.27 MG/DL
GLUCOSE SERPL-MCNC: 105 MG/DL
LDH SERPL-CCNC: 264 U/L
MAGNESIUM SERPL-MCNC: 2.1 MG/DL
PHOSPHATE SERPL-MCNC: 3.2 MG/DL
POTASSIUM SERPL-SCNC: 4.5 MMOL/L
PROT SERPL-MCNC: 7.5 G/DL
SODIUM SERPL-SCNC: 140 MMOL/L
URATE SERPL-MCNC: 6.7 MG/DL

## 2017-11-14 PROCEDURE — 85025 COMPLETE CBC W/AUTO DIFF WBC: CPT

## 2017-11-14 PROCEDURE — 99282 EMERGENCY DEPT VISIT SF MDM: CPT

## 2017-11-14 PROCEDURE — 99215 OFFICE O/P EST HI 40 MIN: CPT

## 2017-11-14 RX ORDER — CIPROFLOXACIN AND DEXAMETHASONE 3; 1 MG/ML; MG/ML
0.3-0.1 SUSPENSION/ DROPS AURICULAR (OTIC)
Qty: 1 | Refills: 1 | Status: DISCONTINUED | COMMUNITY
Start: 2017-11-06 | End: 2017-11-14

## 2017-11-14 RX ORDER — CIPROFLOXACIN HYDROCHLORIDE 500 MG/1
500 TABLET, FILM COATED ORAL
Qty: 14 | Refills: 0 | Status: DISCONTINUED | COMMUNITY
Start: 2017-11-06 | End: 2017-11-14

## 2017-11-14 NOTE — ED STATDOCS - PROGRESS NOTE DETAILS
Spoke to Ilana at [facility], [phone]. For their protocol, need labs within certain time frame from last dose of medication to r/o tumor lysis syndrome. See mdm. See lab orders as requested by his time. Will f/u via results. Oncologist Dr. Vela called saying that the lab found his blood and all results are normal. Requests to cancel labs and discharge pt home. Will oblige.

## 2017-11-14 NOTE — ED STATDOCS - OBJECTIVE STATEMENT
56 yo M, with hx of CLL, presents to ED for lab tests. Pt is currently in clinical trial for CLL and follows hematologist Dr. Phil Epstein. Pt had lab work done today in office and wad advised to go to ED because "there was a problem at the lab". Pt states he needs to have lab work done in order to continue trial. Pt denies medical complaints. No other PMHx.

## 2017-11-14 NOTE — ED ADULT TRIAGE NOTE - CHIEF COMPLAINT QUOTE
pt in clinical trial for CLL. was sent in for blood work. had labs today but there was a problem at the lab.

## 2017-11-15 ENCOUNTER — APPOINTMENT (OUTPATIENT)
Dept: HEMATOLOGY ONCOLOGY | Facility: CLINIC | Age: 57
End: 2017-11-15

## 2017-11-15 ENCOUNTER — APPOINTMENT (OUTPATIENT)
Dept: HEMATOLOGY ONCOLOGY | Facility: CLINIC | Age: 57
End: 2017-11-15
Payer: COMMERCIAL

## 2017-11-15 ENCOUNTER — APPOINTMENT (OUTPATIENT)
Dept: INTERNAL MEDICINE | Facility: CLINIC | Age: 57
End: 2017-11-15
Payer: COMMERCIAL

## 2017-11-15 VITALS
TEMPERATURE: 98.3 F | OXYGEN SATURATION: 99 % | RESPIRATION RATE: 12 BRPM | DIASTOLIC BLOOD PRESSURE: 86 MMHG | SYSTOLIC BLOOD PRESSURE: 130 MMHG | WEIGHT: 200 LBS | BODY MASS INDEX: 30.41 KG/M2 | HEART RATE: 62 BPM

## 2017-11-15 LAB
ALBUMIN SERPL ELPH-MCNC: 4 G/DL
ALBUMIN SERPL ELPH-MCNC: 4.2 G/DL
ALBUMIN SERPL ELPH-MCNC: 4.4 G/DL
ALP BLD-CCNC: 68 U/L
ALP BLD-CCNC: 68 U/L
ALP BLD-CCNC: 73 U/L
ALT SERPL-CCNC: 29 U/L
ALT SERPL-CCNC: 31 U/L
ALT SERPL-CCNC: 33 U/L
ANION GAP SERPL CALC-SCNC: 13 MMOL/L
ANION GAP SERPL CALC-SCNC: 14 MMOL/L
ANION GAP SERPL CALC-SCNC: 17 MMOL/L
AST SERPL-CCNC: 24 U/L
AST SERPL-CCNC: 28 U/L
AST SERPL-CCNC: 28 U/L
BILIRUB SERPL-MCNC: 0.2 MG/DL
BILIRUB SERPL-MCNC: 0.3 MG/DL
BILIRUB SERPL-MCNC: 0.4 MG/DL
BUN SERPL-MCNC: 17 MG/DL
BUN SERPL-MCNC: 17 MG/DL
BUN SERPL-MCNC: 20 MG/DL
CALCIUM SERPL-MCNC: 8.9 MG/DL
CALCIUM SERPL-MCNC: 9 MG/DL
CALCIUM SERPL-MCNC: 9.9 MG/DL
CHLORIDE SERPL-SCNC: 100 MMOL/L
CHLORIDE SERPL-SCNC: 101 MMOL/L
CHLORIDE SERPL-SCNC: 103 MMOL/L
CO2 SERPL-SCNC: 23 MMOL/L
CO2 SERPL-SCNC: 24 MMOL/L
CO2 SERPL-SCNC: 24 MMOL/L
CREAT SERPL-MCNC: 1.09 MG/DL
CREAT SERPL-MCNC: 1.12 MG/DL
CREAT SERPL-MCNC: 1.23 MG/DL
GLUCOSE SERPL-MCNC: 113 MG/DL
GLUCOSE SERPL-MCNC: 133 MG/DL
GLUCOSE SERPL-MCNC: 99 MG/DL
LDH SERPL-CCNC: 284 U/L
LDH SERPL-CCNC: 291 U/L
LDH SERPL-CCNC: 293 U/L
MAGNESIUM SERPL-MCNC: 2 MG/DL
MAGNESIUM SERPL-MCNC: 2.2 MG/DL
MAGNESIUM SERPL-MCNC: 2.2 MG/DL
PHOSPHATE SERPL-MCNC: 2.7 MG/DL
PHOSPHATE SERPL-MCNC: 3.3 MG/DL
PHOSPHATE SERPL-MCNC: 4.2 MG/DL
POTASSIUM SERPL-SCNC: 4 MMOL/L
POTASSIUM SERPL-SCNC: 4 MMOL/L
POTASSIUM SERPL-SCNC: 4.8 MMOL/L
PROT SERPL-MCNC: 7.4 G/DL
PROT SERPL-MCNC: 7.8 G/DL
PROT SERPL-MCNC: 7.9 G/DL
SODIUM SERPL-SCNC: 138 MMOL/L
SODIUM SERPL-SCNC: 138 MMOL/L
SODIUM SERPL-SCNC: 143 MMOL/L
URATE SERPL-MCNC: 6 MG/DL
URATE SERPL-MCNC: 6.3 MG/DL
URATE SERPL-MCNC: 7.1 MG/DL

## 2017-11-15 PROCEDURE — 90686 IIV4 VACC NO PRSV 0.5 ML IM: CPT

## 2017-11-15 PROCEDURE — 99215 OFFICE O/P EST HI 40 MIN: CPT

## 2017-11-15 PROCEDURE — 85027 COMPLETE CBC AUTOMATED: CPT

## 2017-11-15 PROCEDURE — G0008: CPT

## 2017-11-15 RX ORDER — CALCIUM CARBONATE 500 MG/1
500 TABLET, CHEWABLE ORAL
Refills: 0 | Status: DISCONTINUED | COMMUNITY
End: 2017-11-15

## 2017-11-20 ENCOUNTER — APPOINTMENT (OUTPATIENT)
Dept: HEMATOLOGY ONCOLOGY | Facility: CLINIC | Age: 57
End: 2017-11-20
Payer: COMMERCIAL

## 2017-11-20 PROCEDURE — 85025 COMPLETE CBC W/AUTO DIFF WBC: CPT

## 2017-11-21 ENCOUNTER — APPOINTMENT (OUTPATIENT)
Dept: HEMATOLOGY ONCOLOGY | Facility: CLINIC | Age: 57
End: 2017-11-21
Payer: COMMERCIAL

## 2017-11-21 VITALS
RESPIRATION RATE: 12 BRPM | DIASTOLIC BLOOD PRESSURE: 90 MMHG | HEART RATE: 63 BPM | SYSTOLIC BLOOD PRESSURE: 136 MMHG | WEIGHT: 204 LBS | BODY MASS INDEX: 31.02 KG/M2 | OXYGEN SATURATION: 99 % | TEMPERATURE: 97.6 F

## 2017-11-21 LAB
ALBUMIN SERPL ELPH-MCNC: 4.1 G/DL
ALBUMIN SERPL ELPH-MCNC: 4.3 G/DL
ALP BLD-CCNC: 60 U/L
ALP BLD-CCNC: 64 U/L
ALT SERPL-CCNC: 17 U/L
ALT SERPL-CCNC: 18 U/L
ANION GAP SERPL CALC-SCNC: 11 MMOL/L
ANION GAP SERPL CALC-SCNC: 9 MMOL/L
AST SERPL-CCNC: 22 U/L
AST SERPL-CCNC: 23 U/L
BILIRUB SERPL-MCNC: 0.3 MG/DL
BILIRUB SERPL-MCNC: 0.4 MG/DL
BUN SERPL-MCNC: 16 MG/DL
BUN SERPL-MCNC: 19 MG/DL
CALCIUM SERPL-MCNC: 9.5 MG/DL
CALCIUM SERPL-MCNC: 9.7 MG/DL
CHLORIDE SERPL-SCNC: 100 MMOL/L
CHLORIDE SERPL-SCNC: 101 MMOL/L
CO2 SERPL-SCNC: 24 MMOL/L
CO2 SERPL-SCNC: 26 MMOL/L
CREAT SERPL-MCNC: 1.16 MG/DL
CREAT SERPL-MCNC: 1.21 MG/DL
GLUCOSE SERPL-MCNC: 108 MG/DL
GLUCOSE SERPL-MCNC: 129 MG/DL
LDH SERPL-CCNC: 171 U/L
LDH SERPL-CCNC: 194 U/L
MAGNESIUM SERPL-MCNC: 2.1 MG/DL
MAGNESIUM SERPL-MCNC: 2.2 MG/DL
PHOSPHATE SERPL-MCNC: 3.3 MG/DL
PHOSPHATE SERPL-MCNC: 3.7 MG/DL
POTASSIUM SERPL-SCNC: 4.2 MMOL/L
POTASSIUM SERPL-SCNC: 4.3 MMOL/L
PROT SERPL-MCNC: 7.3 G/DL
PROT SERPL-MCNC: 7.3 G/DL
SODIUM SERPL-SCNC: 135 MMOL/L
SODIUM SERPL-SCNC: 136 MMOL/L
URATE SERPL-MCNC: 6.2 MG/DL
URATE SERPL-MCNC: 6.7 MG/DL

## 2017-11-21 PROCEDURE — 85025 COMPLETE CBC W/AUTO DIFF WBC: CPT

## 2017-11-21 PROCEDURE — 99215 OFFICE O/P EST HI 40 MIN: CPT

## 2017-11-22 ENCOUNTER — APPOINTMENT (OUTPATIENT)
Dept: HEMATOLOGY ONCOLOGY | Facility: CLINIC | Age: 57
End: 2017-11-22

## 2017-11-22 ENCOUNTER — APPOINTMENT (OUTPATIENT)
Dept: HEMATOLOGY ONCOLOGY | Facility: CLINIC | Age: 57
End: 2017-11-22
Payer: COMMERCIAL

## 2017-11-22 LAB
LDH SERPL-CCNC: 178 U/L
MAGNESIUM SERPL-MCNC: 2.1 MG/DL
PHOSPHATE SERPL-MCNC: 4.3 MG/DL
URATE SERPL-MCNC: 6.3 MG/DL

## 2017-11-22 PROCEDURE — 85025 COMPLETE CBC W/AUTO DIFF WBC: CPT

## 2017-11-27 ENCOUNTER — APPOINTMENT (OUTPATIENT)
Dept: HEMATOLOGY ONCOLOGY | Facility: CLINIC | Age: 57
End: 2017-11-27
Payer: COMMERCIAL

## 2017-11-27 LAB
LDH SERPL-CCNC: 168 U/L
MAGNESIUM SERPL-MCNC: 2.1 MG/DL
PHOSPHATE SERPL-MCNC: 3.9 MG/DL
URATE SERPL-MCNC: 5.6 MG/DL

## 2017-11-27 PROCEDURE — 85025 COMPLETE CBC W/AUTO DIFF WBC: CPT

## 2017-11-28 ENCOUNTER — APPOINTMENT (OUTPATIENT)
Dept: HEMATOLOGY ONCOLOGY | Facility: CLINIC | Age: 57
End: 2017-11-28
Payer: COMMERCIAL

## 2017-11-28 VITALS
HEART RATE: 65 BPM | TEMPERATURE: 97.9 F | OXYGEN SATURATION: 98 % | DIASTOLIC BLOOD PRESSURE: 84 MMHG | SYSTOLIC BLOOD PRESSURE: 118 MMHG | WEIGHT: 204 LBS | RESPIRATION RATE: 12 BRPM | BODY MASS INDEX: 30.92 KG/M2 | HEIGHT: 68 IN

## 2017-11-28 LAB
LDH SERPL-CCNC: 158 U/L
MAGNESIUM SERPL-MCNC: 2 MG/DL
PHOSPHATE SERPL-MCNC: 3.3 MG/DL
URATE SERPL-MCNC: 5 MG/DL

## 2017-11-28 PROCEDURE — 99215 OFFICE O/P EST HI 40 MIN: CPT

## 2017-11-28 PROCEDURE — 85025 COMPLETE CBC W/AUTO DIFF WBC: CPT

## 2017-11-29 ENCOUNTER — APPOINTMENT (OUTPATIENT)
Dept: HEMATOLOGY ONCOLOGY | Facility: CLINIC | Age: 57
End: 2017-11-29
Payer: COMMERCIAL

## 2017-11-29 ENCOUNTER — LABORATORY RESULT (OUTPATIENT)
Age: 57
End: 2017-11-29

## 2017-11-29 LAB
ALBUMIN SERPL ELPH-MCNC: 4.3 G/DL
ALP BLD-CCNC: 61 U/L
ALT SERPL-CCNC: 15 U/L
ANION GAP SERPL CALC-SCNC: 12 MMOL/L
AST SERPL-CCNC: 17 U/L
BILIRUB SERPL-MCNC: 0.3 MG/DL
BUN SERPL-MCNC: 19 MG/DL
CALCIUM SERPL-MCNC: 10 MG/DL
CHLORIDE SERPL-SCNC: 102 MMOL/L
CO2 SERPL-SCNC: 26 MMOL/L
CREAT SERPL-MCNC: 1.16 MG/DL
GLUCOSE SERPL-MCNC: 99 MG/DL
LDH SERPL-CCNC: 165 U/L
MAGNESIUM SERPL-MCNC: 2 MG/DL
PHOSPHATE SERPL-MCNC: 3.4 MG/DL
POTASSIUM SERPL-SCNC: 4.6 MMOL/L
PROT SERPL-MCNC: 7.7 G/DL
SODIUM SERPL-SCNC: 140 MMOL/L
URATE SERPL-MCNC: 4.9 MG/DL

## 2017-11-29 PROCEDURE — 85025 COMPLETE CBC W/AUTO DIFF WBC: CPT

## 2017-12-04 ENCOUNTER — APPOINTMENT (OUTPATIENT)
Dept: HEMATOLOGY ONCOLOGY | Facility: CLINIC | Age: 57
End: 2017-12-04
Payer: COMMERCIAL

## 2017-12-04 LAB
LDH SERPL-CCNC: 158 U/L
MAGNESIUM SERPL-MCNC: 2 MG/DL
PHOSPHATE SERPL-MCNC: 3.2 MG/DL
URATE SERPL-MCNC: 6.5 MG/DL

## 2017-12-04 PROCEDURE — 85025 COMPLETE CBC W/AUTO DIFF WBC: CPT

## 2017-12-05 ENCOUNTER — APPOINTMENT (OUTPATIENT)
Dept: HEMATOLOGY ONCOLOGY | Facility: CLINIC | Age: 57
End: 2017-12-05
Payer: COMMERCIAL

## 2017-12-05 VITALS
TEMPERATURE: 97.7 F | RESPIRATION RATE: 14 BRPM | DIASTOLIC BLOOD PRESSURE: 88 MMHG | HEART RATE: 58 BPM | SYSTOLIC BLOOD PRESSURE: 140 MMHG | BODY MASS INDEX: 30.87 KG/M2 | OXYGEN SATURATION: 99 % | WEIGHT: 203 LBS

## 2017-12-05 LAB
ALBUMIN SERPL ELPH-MCNC: 4.2 G/DL
ALP BLD-CCNC: 55 U/L
ALT SERPL-CCNC: 18 U/L
ANION GAP SERPL CALC-SCNC: 12 MMOL/L
AST SERPL-CCNC: 22 U/L
BILIRUB SERPL-MCNC: 0.6 MG/DL
BUN SERPL-MCNC: 17 MG/DL
CALCIUM SERPL-MCNC: 9.1 MG/DL
CHLORIDE SERPL-SCNC: 100 MMOL/L
CO2 SERPL-SCNC: 26 MMOL/L
CREAT SERPL-MCNC: 1.21 MG/DL
GLUCOSE SERPL-MCNC: 88 MG/DL
LDH SERPL-CCNC: 154 U/L
MAGNESIUM SERPL-MCNC: 2.1 MG/DL
PHOSPHATE SERPL-MCNC: 2.6 MG/DL
POTASSIUM SERPL-SCNC: 4.2 MMOL/L
PROT SERPL-MCNC: 7.3 G/DL
SODIUM SERPL-SCNC: 138 MMOL/L
URATE SERPL-MCNC: 6.3 MG/DL

## 2017-12-05 PROCEDURE — 99215 OFFICE O/P EST HI 40 MIN: CPT

## 2017-12-05 PROCEDURE — 85025 COMPLETE CBC W/AUTO DIFF WBC: CPT

## 2017-12-05 RX ORDER — ALLOPURINOL 100 MG/1
100 TABLET ORAL
Refills: 0 | Status: DISCONTINUED | COMMUNITY
End: 2017-12-05

## 2017-12-06 ENCOUNTER — APPOINTMENT (OUTPATIENT)
Dept: HEMATOLOGY ONCOLOGY | Facility: CLINIC | Age: 57
End: 2017-12-06
Payer: COMMERCIAL

## 2017-12-06 PROCEDURE — 85025 COMPLETE CBC W/AUTO DIFF WBC: CPT

## 2017-12-07 LAB
ALBUMIN SERPL ELPH-MCNC: 4.3 G/DL
ALP BLD-CCNC: 64 U/L
ALT SERPL-CCNC: 16 U/L
ANION GAP SERPL CALC-SCNC: 12 MMOL/L
AST SERPL-CCNC: 21 U/L
BILIRUB SERPL-MCNC: 0.5 MG/DL
BUN SERPL-MCNC: 14 MG/DL
CALCIUM SERPL-MCNC: 9.3 MG/DL
CHLORIDE SERPL-SCNC: 100 MMOL/L
CO2 SERPL-SCNC: 26 MMOL/L
CREAT SERPL-MCNC: 1.25 MG/DL
GLUCOSE SERPL-MCNC: 90 MG/DL
LDH SERPL-CCNC: 166 U/L
MAGNESIUM SERPL-MCNC: 2.1 MG/DL
PHOSPHATE SERPL-MCNC: 3.6 MG/DL
POTASSIUM SERPL-SCNC: 4.4 MMOL/L
PROT SERPL-MCNC: 7.6 G/DL
SODIUM SERPL-SCNC: 138 MMOL/L
URATE SERPL-MCNC: 5.9 MG/DL

## 2017-12-08 LAB
ALBUMIN SERPL ELPH-MCNC: 4 G/DL
ALBUMIN SERPL ELPH-MCNC: 4.3 G/DL
ALP BLD-CCNC: 51 U/L
ALP BLD-CCNC: 67 U/L
ALT SERPL-CCNC: 17 U/L
ALT SERPL-CCNC: 17 U/L
ANION GAP SERPL CALC-SCNC: 10 MMOL/L
ANION GAP SERPL CALC-SCNC: 14 MMOL/L
AST SERPL-CCNC: 21 U/L
AST SERPL-CCNC: 22 U/L
BILIRUB SERPL-MCNC: 0.3 MG/DL
BILIRUB SERPL-MCNC: 0.5 MG/DL
BUN SERPL-MCNC: 19 MG/DL
BUN SERPL-MCNC: 20 MG/DL
CALCIUM SERPL-MCNC: 8.9 MG/DL
CALCIUM SERPL-MCNC: 9.6 MG/DL
CHLORIDE SERPL-SCNC: 101 MMOL/L
CHLORIDE SERPL-SCNC: 104 MMOL/L
CO2 SERPL-SCNC: 22 MMOL/L
CO2 SERPL-SCNC: 25 MMOL/L
CREAT SERPL-MCNC: 1.18 MG/DL
CREAT SERPL-MCNC: 1.35 MG/DL
GLUCOSE SERPL-MCNC: 86 MG/DL
GLUCOSE SERPL-MCNC: 90 MG/DL
POTASSIUM SERPL-SCNC: 4.3 MMOL/L
POTASSIUM SERPL-SCNC: 4.5 MMOL/L
PROT SERPL-MCNC: 7.2 G/DL
PROT SERPL-MCNC: 7.5 G/DL
SODIUM SERPL-SCNC: 137 MMOL/L
SODIUM SERPL-SCNC: 139 MMOL/L

## 2017-12-11 LAB
ALBUMIN SERPL ELPH-MCNC: 4.3 G/DL
ALBUMIN SERPL ELPH-MCNC: 4.5 G/DL
ALP BLD-CCNC: 54 U/L
ALP BLD-CCNC: 56 U/L
ALT SERPL-CCNC: 14 U/L
ALT SERPL-CCNC: 16 U/L
ANION GAP SERPL CALC-SCNC: 14 MMOL/L
ANION GAP SERPL CALC-SCNC: 14 MMOL/L
AST SERPL-CCNC: 17 U/L
AST SERPL-CCNC: 23 U/L
BILIRUB SERPL-MCNC: 0.4 MG/DL
BILIRUB SERPL-MCNC: 0.6 MG/DL
BUN SERPL-MCNC: 19 MG/DL
BUN SERPL-MCNC: 19 MG/DL
CALCIUM SERPL-MCNC: 10.1 MG/DL
CALCIUM SERPL-MCNC: 10.3 MG/DL
CHLORIDE SERPL-SCNC: 102 MMOL/L
CHLORIDE SERPL-SCNC: 99 MMOL/L
CO2 SERPL-SCNC: 24 MMOL/L
CO2 SERPL-SCNC: 24 MMOL/L
CREAT SERPL-MCNC: 1.19 MG/DL
CREAT SERPL-MCNC: 1.27 MG/DL
GLUCOSE SERPL-MCNC: 106 MG/DL
GLUCOSE SERPL-MCNC: 88 MG/DL
POTASSIUM SERPL-SCNC: 4.4 MMOL/L
POTASSIUM SERPL-SCNC: 4.6 MMOL/L
PROT SERPL-MCNC: 7.8 G/DL
PROT SERPL-MCNC: 7.9 G/DL
SODIUM SERPL-SCNC: 137 MMOL/L
SODIUM SERPL-SCNC: 140 MMOL/L

## 2017-12-19 ENCOUNTER — APPOINTMENT (OUTPATIENT)
Dept: HEMATOLOGY ONCOLOGY | Facility: CLINIC | Age: 57
End: 2017-12-19
Payer: COMMERCIAL

## 2017-12-19 VITALS
WEIGHT: 205 LBS | TEMPERATURE: 97.8 F | HEART RATE: 58 BPM | DIASTOLIC BLOOD PRESSURE: 90 MMHG | SYSTOLIC BLOOD PRESSURE: 138 MMHG | OXYGEN SATURATION: 99 % | BODY MASS INDEX: 31.17 KG/M2 | RESPIRATION RATE: 14 BRPM

## 2017-12-19 PROCEDURE — 99215 OFFICE O/P EST HI 40 MIN: CPT

## 2017-12-19 PROCEDURE — 85025 COMPLETE CBC W/AUTO DIFF WBC: CPT

## 2017-12-20 LAB
ALBUMIN SERPL ELPH-MCNC: 4.1 G/DL
ALP BLD-CCNC: 69 U/L
ALT SERPL-CCNC: 16 U/L
ANION GAP SERPL CALC-SCNC: 13 MMOL/L
AST SERPL-CCNC: 28 U/L
BILIRUB SERPL-MCNC: 0.4 MG/DL
BUN SERPL-MCNC: 17 MG/DL
CALCIUM SERPL-MCNC: 9.8 MG/DL
CHLORIDE SERPL-SCNC: 101 MMOL/L
CO2 SERPL-SCNC: 24 MMOL/L
CREAT SERPL-MCNC: 1.14 MG/DL
GLUCOSE SERPL-MCNC: 93 MG/DL
LDH SERPL-CCNC: 169 U/L
MAGNESIUM SERPL-MCNC: 2.1 MG/DL
PHOSPHATE SERPL-MCNC: 3.8 MG/DL
POTASSIUM SERPL-SCNC: 4.1 MMOL/L
PROT SERPL-MCNC: 7.4 G/DL
SODIUM SERPL-SCNC: 138 MMOL/L
URATE SERPL-MCNC: 5.9 MG/DL

## 2018-01-02 ENCOUNTER — APPOINTMENT (OUTPATIENT)
Dept: HEMATOLOGY ONCOLOGY | Facility: CLINIC | Age: 58
End: 2018-01-02
Payer: COMMERCIAL

## 2018-01-02 VITALS
OXYGEN SATURATION: 98 % | SYSTOLIC BLOOD PRESSURE: 140 MMHG | DIASTOLIC BLOOD PRESSURE: 90 MMHG | HEART RATE: 62 BPM | TEMPERATURE: 97.8 F | BODY MASS INDEX: 31.02 KG/M2 | WEIGHT: 204 LBS | RESPIRATION RATE: 14 BRPM

## 2018-01-02 PROCEDURE — 99215 OFFICE O/P EST HI 40 MIN: CPT

## 2018-01-02 PROCEDURE — 85025 COMPLETE CBC W/AUTO DIFF WBC: CPT

## 2018-01-02 RX ORDER — ALLOPURINOL 100 MG/1
100 TABLET ORAL
Refills: 0 | Status: DISCONTINUED | COMMUNITY
End: 2018-01-02

## 2018-01-03 LAB
ALBUMIN SERPL ELPH-MCNC: 4.2 G/DL
ALP BLD-CCNC: 59 U/L
ALT SERPL-CCNC: 17 U/L
ANION GAP SERPL CALC-SCNC: 13 MMOL/L
AST SERPL-CCNC: 22 U/L
BILIRUB SERPL-MCNC: 0.4 MG/DL
BUN SERPL-MCNC: 21 MG/DL
CALCIUM SERPL-MCNC: 9.2 MG/DL
CHLORIDE SERPL-SCNC: 103 MMOL/L
CO2 SERPL-SCNC: 25 MMOL/L
CREAT SERPL-MCNC: 1.35 MG/DL
GLUCOSE SERPL-MCNC: 106 MG/DL
LDH SERPL-CCNC: 172 U/L
MAGNESIUM SERPL-MCNC: 2.1 MG/DL
PHOSPHATE SERPL-MCNC: 3.5 MG/DL
POTASSIUM SERPL-SCNC: 4.4 MMOL/L
PROT SERPL-MCNC: 7.4 G/DL
SODIUM SERPL-SCNC: 141 MMOL/L
URATE SERPL-MCNC: 7.4 MG/DL

## 2018-01-08 ENCOUNTER — APPOINTMENT (OUTPATIENT)
Dept: INTERNAL MEDICINE | Facility: CLINIC | Age: 58
End: 2018-01-08
Payer: COMMERCIAL

## 2018-01-08 ENCOUNTER — APPOINTMENT (OUTPATIENT)
Dept: HEMATOLOGY ONCOLOGY | Facility: CLINIC | Age: 58
End: 2018-01-08
Payer: COMMERCIAL

## 2018-01-08 VITALS
SYSTOLIC BLOOD PRESSURE: 156 MMHG | HEIGHT: 67.5 IN | HEART RATE: 57 BPM | BODY MASS INDEX: 31.02 KG/M2 | WEIGHT: 200 LBS | DIASTOLIC BLOOD PRESSURE: 93 MMHG

## 2018-01-08 LAB
ALBUMIN SERPL ELPH-MCNC: 4.3 G/DL
ALP BLD-CCNC: 72 U/L
ALT SERPL-CCNC: 18 U/L
ANION GAP SERPL CALC-SCNC: 11 MMOL/L
AST SERPL-CCNC: 29 U/L
BILIRUB SERPL-MCNC: 0.4 MG/DL
BUN SERPL-MCNC: 18 MG/DL
CALCIUM SERPL-MCNC: 9.4 MG/DL
CHLORIDE SERPL-SCNC: 103 MMOL/L
CO2 SERPL-SCNC: 24 MMOL/L
CREAT SERPL-MCNC: 1.22 MG/DL
GLUCOSE SERPL-MCNC: 97 MG/DL
POTASSIUM SERPL-SCNC: 4.3 MMOL/L
PROT SERPL-MCNC: 7.6 G/DL
SODIUM SERPL-SCNC: 138 MMOL/L

## 2018-01-08 PROCEDURE — 99214 OFFICE O/P EST MOD 30 MIN: CPT

## 2018-01-08 PROCEDURE — 36415 COLL VENOUS BLD VENIPUNCTURE: CPT

## 2018-01-17 ENCOUNTER — RX RENEWAL (OUTPATIENT)
Age: 58
End: 2018-01-17

## 2018-01-30 ENCOUNTER — APPOINTMENT (OUTPATIENT)
Dept: HEMATOLOGY ONCOLOGY | Facility: CLINIC | Age: 58
End: 2018-01-30
Payer: COMMERCIAL

## 2018-01-30 VITALS
BODY MASS INDEX: 31.79 KG/M2 | SYSTOLIC BLOOD PRESSURE: 128 MMHG | RESPIRATION RATE: 14 BRPM | TEMPERATURE: 97.8 F | HEART RATE: 69 BPM | DIASTOLIC BLOOD PRESSURE: 84 MMHG | OXYGEN SATURATION: 98 % | WEIGHT: 206 LBS

## 2018-01-30 PROCEDURE — 85025 COMPLETE CBC W/AUTO DIFF WBC: CPT

## 2018-01-30 PROCEDURE — 99215 OFFICE O/P EST HI 40 MIN: CPT

## 2018-01-30 RX ORDER — CALCIUM CARBONATE 500 MG/1
500 TABLET, CHEWABLE ORAL
Refills: 0 | Status: DISCONTINUED | COMMUNITY
End: 2018-01-30

## 2018-01-30 RX ORDER — SODIUM SULFACETAMIDE 100 MG/ML
10 LIQUID TOPICAL
Qty: 355 | Refills: 0 | Status: DISCONTINUED | COMMUNITY
Start: 2017-07-19 | End: 2018-01-30

## 2018-01-31 LAB
ALBUMIN SERPL ELPH-MCNC: 4 G/DL
ALP BLD-CCNC: 78 U/L
ALT SERPL-CCNC: 19 U/L
ANION GAP SERPL CALC-SCNC: 15 MMOL/L
AST SERPL-CCNC: 21 U/L
BILIRUB SERPL-MCNC: 0.4 MG/DL
BUN SERPL-MCNC: 17 MG/DL
CALCIUM SERPL-MCNC: 9.4 MG/DL
CHLORIDE SERPL-SCNC: 99 MMOL/L
CO2 SERPL-SCNC: 23 MMOL/L
CREAT SERPL-MCNC: 1.17 MG/DL
GLUCOSE SERPL-MCNC: 93 MG/DL
LDH SERPL-CCNC: 170 U/L
MAGNESIUM SERPL-MCNC: 2 MG/DL
PHOSPHATE SERPL-MCNC: 3.8 MG/DL
POTASSIUM SERPL-SCNC: 4.3 MMOL/L
PROT SERPL-MCNC: 7.5 G/DL
SODIUM SERPL-SCNC: 137 MMOL/L
URATE SERPL-MCNC: 6.2 MG/DL

## 2018-02-27 ENCOUNTER — APPOINTMENT (OUTPATIENT)
Dept: HEMATOLOGY ONCOLOGY | Facility: CLINIC | Age: 58
End: 2018-02-27
Payer: COMMERCIAL

## 2018-02-27 VITALS
DIASTOLIC BLOOD PRESSURE: 88 MMHG | SYSTOLIC BLOOD PRESSURE: 136 MMHG | BODY MASS INDEX: 31.17 KG/M2 | RESPIRATION RATE: 14 BRPM | OXYGEN SATURATION: 99 % | HEART RATE: 69 BPM | WEIGHT: 202 LBS | TEMPERATURE: 98.4 F

## 2018-02-27 PROCEDURE — 99215 OFFICE O/P EST HI 40 MIN: CPT

## 2018-02-27 PROCEDURE — 85027 COMPLETE CBC AUTOMATED: CPT

## 2018-02-28 LAB
ALBUMIN SERPL ELPH-MCNC: 4.2 G/DL
ALP BLD-CCNC: 76 U/L
ALT SERPL-CCNC: 17 U/L
ANION GAP SERPL CALC-SCNC: 13 MMOL/L
AST SERPL-CCNC: 19 U/L
BILIRUB SERPL-MCNC: 0.3 MG/DL
BUN SERPL-MCNC: 17 MG/DL
CALCIUM SERPL-MCNC: 9.4 MG/DL
CHLORIDE SERPL-SCNC: 102 MMOL/L
CO2 SERPL-SCNC: 24 MMOL/L
CREAT SERPL-MCNC: 1.2 MG/DL
GLUCOSE SERPL-MCNC: 95 MG/DL
LDH SERPL-CCNC: 175 U/L
MAGNESIUM SERPL-MCNC: 1.9 MG/DL
PHOSPHATE SERPL-MCNC: 3.3 MG/DL
POTASSIUM SERPL-SCNC: 4.3 MMOL/L
PROT SERPL-MCNC: 7.3 G/DL
SODIUM SERPL-SCNC: 139 MMOL/L
URATE SERPL-MCNC: 6 MG/DL

## 2018-03-27 ENCOUNTER — APPOINTMENT (OUTPATIENT)
Dept: INTERNAL MEDICINE | Facility: CLINIC | Age: 58
End: 2018-03-27
Payer: COMMERCIAL

## 2018-03-27 ENCOUNTER — APPOINTMENT (OUTPATIENT)
Dept: HEMATOLOGY ONCOLOGY | Facility: CLINIC | Age: 58
End: 2018-03-27
Payer: COMMERCIAL

## 2018-03-27 VITALS
WEIGHT: 202 LBS | SYSTOLIC BLOOD PRESSURE: 160 MMHG | HEIGHT: 67 IN | HEART RATE: 67 BPM | BODY MASS INDEX: 31.71 KG/M2 | DIASTOLIC BLOOD PRESSURE: 100 MMHG

## 2018-03-27 VITALS
BODY MASS INDEX: 31.33 KG/M2 | WEIGHT: 203 LBS | RESPIRATION RATE: 14 BRPM | TEMPERATURE: 97.5 F | HEART RATE: 62 BPM | OXYGEN SATURATION: 98 %

## 2018-03-27 VITALS — SYSTOLIC BLOOD PRESSURE: 160 MMHG | DIASTOLIC BLOOD PRESSURE: 98 MMHG

## 2018-03-27 PROCEDURE — 99213 OFFICE O/P EST LOW 20 MIN: CPT

## 2018-03-27 PROCEDURE — 99215 OFFICE O/P EST HI 40 MIN: CPT

## 2018-03-27 PROCEDURE — 85025 COMPLETE CBC W/AUTO DIFF WBC: CPT

## 2018-03-28 LAB
ALBUMIN SERPL ELPH-MCNC: 4.2 G/DL
ALP BLD-CCNC: 72 U/L
ALT SERPL-CCNC: 22 U/L
ANION GAP SERPL CALC-SCNC: 14 MMOL/L
AST SERPL-CCNC: 26 U/L
BILIRUB SERPL-MCNC: 0.5 MG/DL
BUN SERPL-MCNC: 17 MG/DL
CALCIUM SERPL-MCNC: 9.3 MG/DL
CHLORIDE SERPL-SCNC: 102 MMOL/L
CO2 SERPL-SCNC: 22 MMOL/L
CREAT SERPL-MCNC: 1.19 MG/DL
GLUCOSE SERPL-MCNC: 100 MG/DL
LDH SERPL-CCNC: 173 U/L
MAGNESIUM SERPL-MCNC: 2.1 MG/DL
PHOSPHATE SERPL-MCNC: 3.6 MG/DL
POTASSIUM SERPL-SCNC: 4.4 MMOL/L
PROT SERPL-MCNC: 7.4 G/DL
SODIUM SERPL-SCNC: 138 MMOL/L
URATE SERPL-MCNC: 6 MG/DL

## 2018-03-30 ENCOUNTER — OTHER (OUTPATIENT)
Age: 58
End: 2018-03-30

## 2018-04-03 ENCOUNTER — APPOINTMENT (OUTPATIENT)
Dept: HEMATOLOGY ONCOLOGY | Facility: CLINIC | Age: 58
End: 2018-04-03
Payer: COMMERCIAL

## 2018-04-03 VITALS
SYSTOLIC BLOOD PRESSURE: 128 MMHG | HEART RATE: 69 BPM | WEIGHT: 200 LBS | OXYGEN SATURATION: 97 % | DIASTOLIC BLOOD PRESSURE: 78 MMHG | BODY MASS INDEX: 31.32 KG/M2 | RESPIRATION RATE: 14 BRPM | TEMPERATURE: 97.8 F

## 2018-04-03 PROCEDURE — 85025 COMPLETE CBC W/AUTO DIFF WBC: CPT

## 2018-04-03 PROCEDURE — 99215 OFFICE O/P EST HI 40 MIN: CPT

## 2018-04-23 ENCOUNTER — APPOINTMENT (OUTPATIENT)
Dept: INTERNAL MEDICINE | Facility: CLINIC | Age: 58
End: 2018-04-23
Payer: COMMERCIAL

## 2018-04-23 VITALS — SYSTOLIC BLOOD PRESSURE: 146 MMHG | HEART RATE: 59 BPM | DIASTOLIC BLOOD PRESSURE: 91 MMHG

## 2018-04-23 VITALS — HEIGHT: 68 IN | WEIGHT: 202 LBS | BODY MASS INDEX: 30.62 KG/M2

## 2018-04-23 PROCEDURE — 99213 OFFICE O/P EST LOW 20 MIN: CPT

## 2018-04-24 ENCOUNTER — OUTPATIENT (OUTPATIENT)
Dept: OUTPATIENT SERVICES | Facility: HOSPITAL | Age: 58
LOS: 1 days | End: 2018-04-24
Payer: COMMERCIAL

## 2018-04-24 ENCOUNTER — APPOINTMENT (OUTPATIENT)
Dept: CT IMAGING | Facility: IMAGING CENTER | Age: 58
End: 2018-04-24
Payer: COMMERCIAL

## 2018-04-24 ENCOUNTER — APPOINTMENT (OUTPATIENT)
Dept: HEMATOLOGY ONCOLOGY | Facility: CLINIC | Age: 58
End: 2018-04-24
Payer: COMMERCIAL

## 2018-04-24 ENCOUNTER — APPOINTMENT (OUTPATIENT)
Dept: HEMATOLOGY ONCOLOGY | Facility: CLINIC | Age: 58
End: 2018-04-24

## 2018-04-24 VITALS
RESPIRATION RATE: 14 BRPM | HEART RATE: 66 BPM | DIASTOLIC BLOOD PRESSURE: 88 MMHG | WEIGHT: 204 LBS | SYSTOLIC BLOOD PRESSURE: 128 MMHG | TEMPERATURE: 97.7 F | OXYGEN SATURATION: 99 % | BODY MASS INDEX: 31.02 KG/M2

## 2018-04-24 DIAGNOSIS — C91.90 LYMPHOID LEUKEMIA, UNSPECIFIED NOT HAVING ACHIEVED REMISSION: ICD-10-CM

## 2018-04-24 PROCEDURE — 74177 CT ABD & PELVIS W/CONTRAST: CPT | Mod: 26

## 2018-04-24 PROCEDURE — 70491 CT SOFT TISSUE NECK W/DYE: CPT

## 2018-04-24 PROCEDURE — 99215 OFFICE O/P EST HI 40 MIN: CPT

## 2018-04-24 PROCEDURE — 71260 CT THORAX DX C+: CPT | Mod: 26

## 2018-04-24 PROCEDURE — 71260 CT THORAX DX C+: CPT

## 2018-04-24 PROCEDURE — 85025 COMPLETE CBC W/AUTO DIFF WBC: CPT

## 2018-04-24 PROCEDURE — 74177 CT ABD & PELVIS W/CONTRAST: CPT

## 2018-04-24 PROCEDURE — 70491 CT SOFT TISSUE NECK W/DYE: CPT | Mod: 26

## 2018-04-25 LAB
ALBUMIN SERPL ELPH-MCNC: 4.2 G/DL
ALP BLD-CCNC: 71 U/L
ALT SERPL-CCNC: 16 U/L
ANION GAP SERPL CALC-SCNC: 14 MMOL/L
AST SERPL-CCNC: 18 U/L
BILIRUB SERPL-MCNC: 0.5 MG/DL
BUN SERPL-MCNC: 17 MG/DL
CALCIUM SERPL-MCNC: 9.3 MG/DL
CHLORIDE SERPL-SCNC: 96 MMOL/L
CO2 SERPL-SCNC: 23 MMOL/L
CREAT SERPL-MCNC: 1.28 MG/DL
GLUCOSE SERPL-MCNC: 96 MG/DL
LDH SERPL-CCNC: 171 U/L
MAGNESIUM SERPL-MCNC: 2.2 MG/DL
PHOSPHATE SERPL-MCNC: 3.9 MG/DL
POTASSIUM SERPL-SCNC: 4.5 MMOL/L
PROT SERPL-MCNC: 7.3 G/DL
SODIUM SERPL-SCNC: 133 MMOL/L
URATE SERPL-MCNC: 5.8 MG/DL

## 2018-05-22 ENCOUNTER — RX RENEWAL (OUTPATIENT)
Age: 58
End: 2018-05-22

## 2018-05-29 ENCOUNTER — APPOINTMENT (OUTPATIENT)
Dept: INTERNAL MEDICINE | Facility: CLINIC | Age: 58
End: 2018-05-29
Payer: COMMERCIAL

## 2018-05-29 VITALS
DIASTOLIC BLOOD PRESSURE: 92 MMHG | HEART RATE: 62 BPM | HEIGHT: 68 IN | SYSTOLIC BLOOD PRESSURE: 158 MMHG | WEIGHT: 207 LBS | BODY MASS INDEX: 31.37 KG/M2

## 2018-05-29 PROCEDURE — 99213 OFFICE O/P EST LOW 20 MIN: CPT

## 2018-05-29 RX ORDER — MUPIROCIN 20 MG/G
2 OINTMENT TOPICAL
Qty: 22 | Refills: 0 | Status: DISCONTINUED | COMMUNITY
Start: 2018-05-23

## 2018-05-29 RX ORDER — KETOCONAZOLE 20.5 MG/ML
2 SHAMPOO, SUSPENSION TOPICAL
Refills: 0 | Status: DISCONTINUED | COMMUNITY
End: 2018-05-29

## 2018-05-29 RX ORDER — CLOBETASOL PROPIONATE 0.5 MG/ML
0.05 SOLUTION TOPICAL
Qty: 25 | Refills: 0 | Status: DISCONTINUED | COMMUNITY
Start: 2018-01-31 | End: 2018-05-29

## 2018-05-29 RX ORDER — CEFADROXIL 500 MG/1
500 CAPSULE ORAL
Qty: 28 | Refills: 0 | Status: DISCONTINUED | COMMUNITY
Start: 2018-05-23

## 2018-05-29 RX ORDER — CLOBETASOL PROPIONATE 0.5 MG/G
0.05 CREAM TOPICAL
Refills: 0 | Status: DISCONTINUED | COMMUNITY
End: 2018-05-29

## 2018-05-29 NOTE — PHYSICAL EXAM
[Well Nourished] : well nourished [No JVD] : no jugular venous distention [Supple] : supple [Normal Rate] : normal rate  [Regular Rhythm] : with a regular rhythm [Normal Supraclavicular Nodes] : no supraclavicular lymphadenopathy [Normal Axillary Nodes] : no axillary lymphadenopathy [Normal Posterior Cervical Nodes] : no posterior cervical lymphadenopathy

## 2018-05-29 NOTE — HISTORY OF PRESENT ILLNESS
[de-identified] : CC: Follow up of Hypertension and other medical problems\par \par HPI:\par This is a 58 year male here for evaluation of:\par \par Hypertension:\par Patient is taking his medication properly and is not having any side effects.\par No side effects from Losartan.\par \par CLL:\par On double treatment.\par Had recent blood tests.

## 2018-05-30 NOTE — ED STATDOCS - CARE PLAN
May 31, 2018      Fransisco Jin Jr., MD  [address]  [address]  [facility]  [facility] [address]           [facility] - Orthoped[facility]  [address], [address]  [facility] [address]  Phone: [phone]          Patient: Alayna Anderson   MR Number: [id number]   YOB: 1959   Date of Visit: 5/30/2018       Dear Dr. Fransisco Jin Jr.:    Thank you for referring Alayna Anderson to me for evaluation. Attached you will find relevant portions of my assessment and plan of care.    If you have questions, please do not hesitate to call me. I look forward to following Alayna Anderson along with you.    Sincerely,    DENYS Ramirez  CC:  No Recipients    If you would like to receive this communication electronically, please contact [email or web address] or [phone] to request more information on [facility] [employer] access.    For providers and/or their staff who would like to refer a patient to [facility], please contact us through our one-stop-shop provider referral line, [facility], at [phone].    If you feel you have received this communication in error or would no longer like to receive these types of communications, please e-mail [email or web address]          Principal Discharge DX:	Encounter for laboratory test

## 2018-06-05 ENCOUNTER — APPOINTMENT (OUTPATIENT)
Dept: HEMATOLOGY ONCOLOGY | Facility: CLINIC | Age: 58
End: 2018-06-05
Payer: COMMERCIAL

## 2018-06-05 VITALS
TEMPERATURE: 97.8 F | DIASTOLIC BLOOD PRESSURE: 92 MMHG | OXYGEN SATURATION: 98 % | SYSTOLIC BLOOD PRESSURE: 150 MMHG | WEIGHT: 204 LBS | BODY MASS INDEX: 31.02 KG/M2 | HEART RATE: 56 BPM | RESPIRATION RATE: 14 BRPM

## 2018-06-05 PROCEDURE — 99215 OFFICE O/P EST HI 40 MIN: CPT

## 2018-06-05 PROCEDURE — 85025 COMPLETE CBC W/AUTO DIFF WBC: CPT

## 2018-06-06 LAB
ALBUMIN SERPL ELPH-MCNC: 4.2 G/DL
ALP BLD-CCNC: 69 U/L
ALT SERPL-CCNC: 22 U/L
ANION GAP SERPL CALC-SCNC: 14 MMOL/L
AST SERPL-CCNC: 27 U/L
BILIRUB SERPL-MCNC: 0.5 MG/DL
BUN SERPL-MCNC: 15 MG/DL
CALCIUM SERPL-MCNC: 9.1 MG/DL
CHLORIDE SERPL-SCNC: 101 MMOL/L
CO2 SERPL-SCNC: 23 MMOL/L
CREAT SERPL-MCNC: 1.15 MG/DL
GLUCOSE SERPL-MCNC: 98 MG/DL
LDH SERPL-CCNC: 180 U/L
MAGNESIUM SERPL-MCNC: 2.1 MG/DL
PHOSPHATE SERPL-MCNC: 3.5 MG/DL
POTASSIUM SERPL-SCNC: 4.1 MMOL/L
PROT SERPL-MCNC: 7.1 G/DL
SODIUM SERPL-SCNC: 138 MMOL/L
URATE SERPL-MCNC: 5.7 MG/DL

## 2018-06-11 ENCOUNTER — RX RENEWAL (OUTPATIENT)
Age: 58
End: 2018-06-11

## 2018-07-17 ENCOUNTER — APPOINTMENT (OUTPATIENT)
Dept: HEMATOLOGY ONCOLOGY | Facility: CLINIC | Age: 58
End: 2018-07-17
Payer: COMMERCIAL

## 2018-07-17 VITALS
TEMPERATURE: 98 F | OXYGEN SATURATION: 99 % | WEIGHT: 205 LBS | HEART RATE: 59 BPM | DIASTOLIC BLOOD PRESSURE: 82 MMHG | RESPIRATION RATE: 14 BRPM | SYSTOLIC BLOOD PRESSURE: 120 MMHG | BODY MASS INDEX: 31.17 KG/M2

## 2018-07-17 PROCEDURE — 99215 OFFICE O/P EST HI 40 MIN: CPT

## 2018-07-17 PROCEDURE — 85025 COMPLETE CBC W/AUTO DIFF WBC: CPT

## 2018-07-18 LAB
ALBUMIN SERPL ELPH-MCNC: 4.1 G/DL
ALP BLD-CCNC: 91 U/L
ALT SERPL-CCNC: 23 U/L
ANION GAP SERPL CALC-SCNC: 12 MMOL/L
AST SERPL-CCNC: 22 U/L
BILIRUB SERPL-MCNC: 0.3 MG/DL
BUN SERPL-MCNC: 14 MG/DL
CALCIUM SERPL-MCNC: 8.8 MG/DL
CHLORIDE SERPL-SCNC: 101 MMOL/L
CO2 SERPL-SCNC: 24 MMOL/L
CREAT SERPL-MCNC: 1.05 MG/DL
GLUCOSE SERPL-MCNC: 101 MG/DL
LDH SERPL-CCNC: 197 U/L
MAGNESIUM SERPL-MCNC: 2.2 MG/DL
PHOSPHATE SERPL-MCNC: 2.7 MG/DL
POTASSIUM SERPL-SCNC: 4 MMOL/L
PROT SERPL-MCNC: 6.9 G/DL
SODIUM SERPL-SCNC: 137 MMOL/L
URATE SERPL-MCNC: 5.4 MG/DL

## 2018-08-15 ENCOUNTER — APPOINTMENT (OUTPATIENT)
Dept: HEMATOLOGY ONCOLOGY | Facility: CLINIC | Age: 58
End: 2018-08-15

## 2018-08-28 ENCOUNTER — APPOINTMENT (OUTPATIENT)
Dept: HEMATOLOGY ONCOLOGY | Facility: CLINIC | Age: 58
End: 2018-08-28
Payer: COMMERCIAL

## 2018-08-28 VITALS
BODY MASS INDEX: 30.87 KG/M2 | OXYGEN SATURATION: 99 % | TEMPERATURE: 97.9 F | WEIGHT: 203 LBS | HEART RATE: 60 BPM | RESPIRATION RATE: 14 BRPM

## 2018-08-28 VITALS — DIASTOLIC BLOOD PRESSURE: 88 MMHG | SYSTOLIC BLOOD PRESSURE: 132 MMHG

## 2018-08-28 PROCEDURE — 85025 COMPLETE CBC W/AUTO DIFF WBC: CPT

## 2018-08-28 PROCEDURE — 99215 OFFICE O/P EST HI 40 MIN: CPT

## 2018-08-29 LAB
ALBUMIN SERPL ELPH-MCNC: 4.2 G/DL
ALP BLD-CCNC: 81 U/L
ALT SERPL-CCNC: 16 U/L
ANION GAP SERPL CALC-SCNC: 15 MMOL/L
AST SERPL-CCNC: 22 U/L
BILIRUB SERPL-MCNC: 0.4 MG/DL
BUN SERPL-MCNC: 18 MG/DL
CALCIUM SERPL-MCNC: 9.3 MG/DL
CHLORIDE SERPL-SCNC: 102 MMOL/L
CO2 SERPL-SCNC: 22 MMOL/L
CREAT SERPL-MCNC: 1.11 MG/DL
GLUCOSE SERPL-MCNC: 97 MG/DL
LDH SERPL-CCNC: 181 U/L
MAGNESIUM SERPL-MCNC: 2.1 MG/DL
PHOSPHATE SERPL-MCNC: 3.1 MG/DL
POTASSIUM SERPL-SCNC: 4.1 MMOL/L
PROT SERPL-MCNC: 7 G/DL
SODIUM SERPL-SCNC: 139 MMOL/L
URATE SERPL-MCNC: 5 MG/DL

## 2018-09-17 ENCOUNTER — APPOINTMENT (OUTPATIENT)
Dept: INTERNAL MEDICINE | Facility: CLINIC | Age: 58
End: 2018-09-17
Payer: COMMERCIAL

## 2018-09-17 VITALS
HEIGHT: 68 IN | SYSTOLIC BLOOD PRESSURE: 160 MMHG | BODY MASS INDEX: 30.62 KG/M2 | WEIGHT: 202 LBS | HEART RATE: 59 BPM | DIASTOLIC BLOOD PRESSURE: 99 MMHG

## 2018-09-17 PROCEDURE — 99213 OFFICE O/P EST LOW 20 MIN: CPT

## 2018-09-17 RX ORDER — BETAMETHASONE DIPROPIONATE 0.5 MG/G
0.05 LOTION TOPICAL
Qty: 60 | Refills: 0 | Status: DISCONTINUED | COMMUNITY
Start: 2018-08-08

## 2018-09-17 RX ORDER — BETAMETHASONE DIPROPIONATE 0.5 MG/G
0.05 CREAM, AUGMENTED TOPICAL
Refills: 0 | Status: DISCONTINUED | COMMUNITY
End: 2018-09-17

## 2018-09-17 RX ORDER — LOSARTAN POTASSIUM 25 MG/1
25 TABLET, FILM COATED ORAL
Qty: 30 | Refills: 0 | Status: DISCONTINUED | COMMUNITY
Start: 2018-04-26

## 2018-09-17 RX ORDER — METRONIDAZOLE 7.5 MG/G
0.75 CREAM TOPICAL
Refills: 0 | Status: DISCONTINUED | COMMUNITY
End: 2018-09-17

## 2018-09-17 NOTE — HISTORY OF PRESENT ILLNESS
[de-identified] : CC: Follow up of Hypertension and other medical problems\par \par HPI:\par This is a 58 year male here for evaluation of:\par \par Hypertension:\par Patient is taking his medication properly and is not having any side effects.\par \par CLL:\par Discussed the protocol that he is in.

## 2018-09-17 NOTE — PHYSICAL EXAM
[No Acute Distress] : no acute distress [Well Nourished] : well nourished [Well Developed] : well developed [No Respiratory Distress] : no respiratory distress  [Clear to Auscultation] : lungs were clear to auscultation bilaterally [No Accessory Muscle Use] : no accessory muscle use [Normal Rate] : normal rate  [Regular Rhythm] : with a regular rhythm [Normal S1, S2] : normal S1 and S2 [Normal Supraclavicular Nodes] : no supraclavicular lymphadenopathy [Normal Axillary Nodes] : no axillary lymphadenopathy [Normal Anterior Cervical Nodes] : no anterior cervical lymphadenopathy

## 2018-09-26 ENCOUNTER — LABORATORY RESULT (OUTPATIENT)
Age: 58
End: 2018-09-26

## 2018-09-26 ENCOUNTER — APPOINTMENT (OUTPATIENT)
Dept: HEMATOLOGY ONCOLOGY | Facility: CLINIC | Age: 58
End: 2018-09-26
Payer: COMMERCIAL

## 2018-09-26 ENCOUNTER — OUTPATIENT (OUTPATIENT)
Dept: OUTPATIENT SERVICES | Facility: HOSPITAL | Age: 58
LOS: 1 days | End: 2018-09-26
Payer: COMMERCIAL

## 2018-09-26 PROCEDURE — 88264 CHROMOSOME ANALYSIS 20-25: CPT

## 2018-09-26 PROCEDURE — 88237 TISSUE CULTURE BONE MARROW: CPT

## 2018-09-26 PROCEDURE — 38222 DX BONE MARROW BX & ASPIR: CPT | Mod: LT

## 2018-09-26 PROCEDURE — 88291 CYTO/MOLECULAR REPORT: CPT

## 2018-09-26 PROCEDURE — 85025 COMPLETE CBC W/AUTO DIFF WBC: CPT

## 2018-09-26 PROCEDURE — 88280 CHROMOSOME KARYOTYPE STUDY: CPT

## 2018-09-26 PROCEDURE — 88275 CYTOGENETICS 100-300: CPT

## 2018-09-26 PROCEDURE — 88271 CYTOGENETICS DNA PROBE: CPT

## 2018-09-27 ENCOUNTER — LABORATORY RESULT (OUTPATIENT)
Age: 58
End: 2018-09-27

## 2018-09-27 DIAGNOSIS — C91.10 CHRONIC LYMPHOCYTIC LEUKEMIA OF B-CELL TYPE NOT HAVING ACHIEVED REMISSION: ICD-10-CM

## 2018-10-08 ENCOUNTER — APPOINTMENT (OUTPATIENT)
Dept: CT IMAGING | Facility: IMAGING CENTER | Age: 58
End: 2018-10-08
Payer: COMMERCIAL

## 2018-10-08 ENCOUNTER — OUTPATIENT (OUTPATIENT)
Dept: OUTPATIENT SERVICES | Facility: HOSPITAL | Age: 58
LOS: 1 days | End: 2018-10-08
Payer: COMMERCIAL

## 2018-10-08 DIAGNOSIS — C91.90 LYMPHOID LEUKEMIA, UNSPECIFIED NOT HAVING ACHIEVED REMISSION: ICD-10-CM

## 2018-10-08 PROCEDURE — 74177 CT ABD & PELVIS W/CONTRAST: CPT | Mod: 26

## 2018-10-08 PROCEDURE — 71260 CT THORAX DX C+: CPT | Mod: 26

## 2018-10-08 PROCEDURE — 71260 CT THORAX DX C+: CPT

## 2018-10-08 PROCEDURE — 82565 ASSAY OF CREATININE: CPT

## 2018-10-08 PROCEDURE — 70491 CT SOFT TISSUE NECK W/DYE: CPT

## 2018-10-08 PROCEDURE — 74177 CT ABD & PELVIS W/CONTRAST: CPT

## 2018-10-08 PROCEDURE — 70491 CT SOFT TISSUE NECK W/DYE: CPT | Mod: 26

## 2018-10-09 ENCOUNTER — LABORATORY RESULT (OUTPATIENT)
Age: 58
End: 2018-10-09

## 2018-10-09 ENCOUNTER — OTHER (OUTPATIENT)
Age: 58
End: 2018-10-09

## 2018-10-09 ENCOUNTER — APPOINTMENT (OUTPATIENT)
Dept: HEMATOLOGY ONCOLOGY | Facility: CLINIC | Age: 58
End: 2018-10-09
Payer: COMMERCIAL

## 2018-10-09 PROCEDURE — 99215 OFFICE O/P EST HI 40 MIN: CPT

## 2018-10-09 PROCEDURE — 85025 COMPLETE CBC W/AUTO DIFF WBC: CPT

## 2018-10-10 ENCOUNTER — OTHER (OUTPATIENT)
Age: 58
End: 2018-10-10

## 2018-10-16 LAB — CHROM ANALY INTERPHASE BLD FISH-IMP: SIGNIFICANT CHANGE UP

## 2018-10-26 ENCOUNTER — OTHER (OUTPATIENT)
Age: 58
End: 2018-10-26

## 2018-11-06 ENCOUNTER — APPOINTMENT (OUTPATIENT)
Dept: HEMATOLOGY ONCOLOGY | Facility: CLINIC | Age: 58
End: 2018-11-06
Payer: COMMERCIAL

## 2018-11-06 VITALS
DIASTOLIC BLOOD PRESSURE: 75 MMHG | SYSTOLIC BLOOD PRESSURE: 130 MMHG | HEART RATE: 69 BPM | OXYGEN SATURATION: 98 % | RESPIRATION RATE: 14 BRPM

## 2018-11-06 VITALS — TEMPERATURE: 97.9 F

## 2018-11-06 LAB — CHROM ANALY OVERALL INTERP SPEC-IMP: SIGNIFICANT CHANGE UP

## 2018-11-06 PROCEDURE — 85025 COMPLETE CBC W/AUTO DIFF WBC: CPT

## 2018-11-06 PROCEDURE — 99215 OFFICE O/P EST HI 40 MIN: CPT

## 2018-11-08 VITALS — WEIGHT: 202 LBS | BODY MASS INDEX: 30.71 KG/M2

## 2018-11-08 LAB
ALBUMIN SERPL ELPH-MCNC: 4.3 G/DL
ALP BLD-CCNC: 96 U/L
ALT SERPL-CCNC: 36 U/L
ANION GAP SERPL CALC-SCNC: 15 MMOL/L
AST SERPL-CCNC: 35 U/L
BILIRUB SERPL-MCNC: 0.5 MG/DL
BUN SERPL-MCNC: 15 MG/DL
CALCIUM SERPL-MCNC: 9.2 MG/DL
CHLORIDE SERPL-SCNC: 102 MMOL/L
CO2 SERPL-SCNC: 22 MMOL/L
CREAT SERPL-MCNC: 1.11 MG/DL
GLUCOSE SERPL-MCNC: 117 MG/DL
LDH SERPL-CCNC: 212 U/L
MAGNESIUM SERPL-MCNC: 2.1 MG/DL
PHOSPHATE SERPL-MCNC: 3 MG/DL
POTASSIUM SERPL-SCNC: 3.7 MMOL/L
PROT SERPL-MCNC: 7 G/DL
SODIUM SERPL-SCNC: 139 MMOL/L
URATE SERPL-MCNC: 6.3 MG/DL

## 2018-12-02 ENCOUNTER — FORM ENCOUNTER (OUTPATIENT)
Age: 58
End: 2018-12-02

## 2018-12-03 ENCOUNTER — APPOINTMENT (OUTPATIENT)
Dept: INTERNAL MEDICINE | Facility: CLINIC | Age: 58
End: 2018-12-03
Payer: COMMERCIAL

## 2018-12-03 ENCOUNTER — APPOINTMENT (OUTPATIENT)
Dept: RADIOLOGY | Facility: IMAGING CENTER | Age: 58
End: 2018-12-03
Payer: COMMERCIAL

## 2018-12-03 ENCOUNTER — OUTPATIENT (OUTPATIENT)
Dept: OUTPATIENT SERVICES | Facility: HOSPITAL | Age: 58
LOS: 1 days | End: 2018-12-03
Payer: COMMERCIAL

## 2018-12-03 VITALS
HEIGHT: 68 IN | BODY MASS INDEX: 30.31 KG/M2 | HEART RATE: 66 BPM | DIASTOLIC BLOOD PRESSURE: 84 MMHG | WEIGHT: 200 LBS | SYSTOLIC BLOOD PRESSURE: 133 MMHG

## 2018-12-03 DIAGNOSIS — J06.9 ACUTE UPPER RESPIRATORY INFECTION, UNSPECIFIED: ICD-10-CM

## 2018-12-03 PROCEDURE — 36415 COLL VENOUS BLD VENIPUNCTURE: CPT

## 2018-12-03 PROCEDURE — 71046 X-RAY EXAM CHEST 2 VIEWS: CPT

## 2018-12-03 PROCEDURE — 99213 OFFICE O/P EST LOW 20 MIN: CPT | Mod: 25

## 2018-12-03 PROCEDURE — 71046 X-RAY EXAM CHEST 2 VIEWS: CPT | Mod: 26

## 2018-12-03 NOTE — ASSESSMENT
[FreeTextEntry1] : URI: \par stat xray\par routine labs \par Start Azithromycin\par OTC agents\par fu as needed

## 2018-12-03 NOTE — REVIEW OF SYSTEMS
[Shortness Of Breath] : no shortness of breath [Wheezing] : no wheezing [Cough] : cough [Negative] : Cardiovascular

## 2018-12-03 NOTE — HISTORY OF PRESENT ILLNESS
[FreeTextEntry8] : 58 year old male here today c/o a cough/ \par Recently off the Venclexta. \par He is On a study for Imbruvica. \par He is still on the Valcyclovir. \par \par \par He states he has a dry cough for about 10 days. He has not taken anything for this. \par Denies fevers, chills, SOB.  Denies body aches. \par Denies any other symptoms. \par He states it was much worse but it is getting better. \par \par \par \par

## 2018-12-03 NOTE — PHYSICAL EXAM
[No Acute Distress] : no acute distress [Well Nourished] : well nourished [Well Developed] : well developed [No JVD] : no jugular venous distention [No Respiratory Distress] : no respiratory distress  [No Accessory Muscle Use] : no accessory muscle use [Normal Rate] : normal rate  [Regular Rhythm] : with a regular rhythm [Normal S1, S2] : normal S1 and S2 [Normal Affect] : the affect was normal [Normal Insight/Judgement] : insight and judgment were intact [de-identified] : bibasilar wheezing

## 2018-12-04 LAB
ALBUMIN SERPL ELPH-MCNC: 4 G/DL
ALBUMIN SERPL ELPH-MCNC: 4.4 G/DL
ALP BLD-CCNC: 88 U/L
ALP BLD-CCNC: 91 U/L
ALT SERPL-CCNC: 24 U/L
ALT SERPL-CCNC: 40 U/L
ANION GAP SERPL CALC-SCNC: 12 MMOL/L
ANION GAP SERPL CALC-SCNC: 12 MMOL/L
AST SERPL-CCNC: 27 U/L
AST SERPL-CCNC: 31 U/L
BASOPHILS # BLD AUTO: 0.02 K/UL
BASOPHILS NFR BLD AUTO: 0.3 %
BILIRUB SERPL-MCNC: 0.5 MG/DL
BILIRUB SERPL-MCNC: 0.5 MG/DL
BUN SERPL-MCNC: 13 MG/DL
BUN SERPL-MCNC: 18 MG/DL
CALCIUM SERPL-MCNC: 9.1 MG/DL
CALCIUM SERPL-MCNC: 9.8 MG/DL
CHLORIDE SERPL-SCNC: 103 MMOL/L
CHLORIDE SERPL-SCNC: 104 MMOL/L
CO2 SERPL-SCNC: 22 MMOL/L
CO2 SERPL-SCNC: 23 MMOL/L
CREAT SERPL-MCNC: 1.07 MG/DL
CREAT SERPL-MCNC: 1.19 MG/DL
EOSINOPHIL # BLD AUTO: 0.04 K/UL
EOSINOPHIL NFR BLD AUTO: 0.5 %
GLUCOSE SERPL-MCNC: 101 MG/DL
HCT VFR BLD CALC: 40.2 %
HGB BLD-MCNC: 13.6 G/DL
IMM GRANULOCYTES NFR BLD AUTO: 0.3 %
LDH SERPL-CCNC: 194 U/L
LYMPHOCYTES # BLD AUTO: 1.39 K/UL
LYMPHOCYTES NFR BLD AUTO: 17.7 %
MAGNESIUM SERPL-MCNC: 2.2 MG/DL
MAN DIFF?: NORMAL
MCHC RBC-ENTMCNC: 30.6 PG
MCHC RBC-ENTMCNC: 33.8 GM/DL
MCV RBC AUTO: 90.3 FL
MONOCYTES # BLD AUTO: 0.79 K/UL
MONOCYTES NFR BLD AUTO: 10.1 %
NEUTROPHILS # BLD AUTO: 5.59 K/UL
NEUTROPHILS NFR BLD AUTO: 71.1 %
PHOSPHATE SERPL-MCNC: 3.4 MG/DL
PLATELET # BLD AUTO: 191 K/UL
POTASSIUM SERPL-SCNC: 4.2 MMOL/L
POTASSIUM SERPL-SCNC: 4.4 MMOL/L
PROT SERPL-MCNC: 7 G/DL
PROT SERPL-MCNC: 7.6 G/DL
RBC # BLD: 4.45 M/UL
RBC # FLD: 14.4 %
SAVE SPECIMEN: NORMAL
SODIUM SERPL-SCNC: 137 MMOL/L
SODIUM SERPL-SCNC: 139 MMOL/L
URATE SERPL-MCNC: 5.2 MG/DL
WBC # FLD AUTO: 7.85 K/UL

## 2018-12-10 NOTE — REVIEW OF SYSTEMS
[Negative] : Allergic/Immunologic [FreeTextEntry4] : dry nose, grade 1 sores in nose, using metronidazole 0.75% cream as needed

## 2018-12-10 NOTE — PHYSICAL EXAM
[Fully active, able to carry on all pre-disease performance without restriction] : Status 0 - Fully active, able to carry on all pre-disease performance without restriction [Normal] : affect appropriate [de-identified] : EOMI, anicteric sclera, conjunctiva normal [de-identified] : MMM, +blisters on lips, no bleeding [de-identified] : no c/c/e [de-identified] : soft; NT/ND; NABS; no hepatosplenomegaly [de-identified] : no bilateral cervical, no bilateral supraclavicular, no bilateral axillary, and no bilateral inguinal lymphadenopathy palpated

## 2018-12-10 NOTE — ASSESSMENT
[FreeTextEntry1] : Mr. Don is a 58 year old male with CLL diagnosed in 2014. 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 He presents for follow up on [facility]-1142.\par  \par Plan:\par 1)CBC reviewed, counts excellent\par 2)feels well with excellent response to therapy, continue study drugs as per protocol\par 3)continue losartan 50mg daily as prescribed, BP excellent today\par 4)continue prescribed cream on nose as needed for dryness and sores, follow-up with dermatology as scheduled\par 5)continue routine health maintenance and age appropriate screening as indicated\par 7) Blisters on lips: Rx valtrex 1g every 12 hours\par \par 8)Patient given cycle 17 day #1 of study in clinic at 14:40 pm\par 9)RTO in 3 months

## 2018-12-10 NOTE — HISTORY OF PRESENT ILLNESS
[Date: ____________] : Patient's last distress assessment performed on [unfilled]. [0 - No Distress] : Distress Level: 0 [Patient given social work contact information and resource sheet] : Patient was given social work contact information and resource sheet [de-identified] : Mr. Don is a 58 year old male with CLL (diagnosed 2014) who presents for follow-up on [id number]. \par He reports feeling well overall today. No fevers or drenching night sweats. Good appetite. Weight overall stable. Normal bowel movements. No chest pain or shortness of breath. No palpitations. Energy stable. No infections. When asked about blisters on lips he admits it started a few days ago. He didn't think it was a concern because he had it before. Denies any itching, sores in mouth, changes in vision.\par \par  [de-identified] : CLL Markers:\par CD38+, ZAP70+\par unmutated IGHV\par TP53 mutation [FreeTextEntry1] : [id number]

## 2018-12-10 NOTE — RESULTS/DATA
[FreeTextEntry1] : CBC done 18\par WBC: 4.6\par A.68\par ANC: 2.43\par Hgb: 14.0\par Hct: 40.1\par Plt: 148.0

## 2019-01-22 ENCOUNTER — APPOINTMENT (OUTPATIENT)
Dept: INTERNAL MEDICINE | Facility: CLINIC | Age: 59
End: 2019-01-22
Payer: COMMERCIAL

## 2019-01-22 VITALS
DIASTOLIC BLOOD PRESSURE: 89 MMHG | HEIGHT: 68 IN | WEIGHT: 202 LBS | SYSTOLIC BLOOD PRESSURE: 139 MMHG | HEART RATE: 60 BPM | BODY MASS INDEX: 30.62 KG/M2

## 2019-01-22 PROCEDURE — 99213 OFFICE O/P EST LOW 20 MIN: CPT

## 2019-01-22 RX ORDER — AZITHROMYCIN 250 MG/1
250 TABLET, FILM COATED ORAL
Qty: 1 | Refills: 0 | Status: DISCONTINUED | COMMUNITY
Start: 2018-12-03 | End: 2019-01-22

## 2019-01-22 RX ORDER — VENETOCLAX 100 MG/1
TABLET, FILM COATED ORAL
Refills: 0 | Status: DISCONTINUED | COMMUNITY
End: 2019-01-22

## 2019-01-22 RX ORDER — VALACYCLOVIR 1 G/1
1 TABLET, FILM COATED ORAL
Qty: 30 | Refills: 1 | Status: DISCONTINUED | COMMUNITY
Start: 2018-11-06 | End: 2019-01-22

## 2019-01-22 NOTE — ASSESSMENT
[FreeTextEntry1] : Hypertension:\par Not as good as I would like..\par Continue current medication.\par Low salt diet.\par \par R- 3 mos.\par

## 2019-01-22 NOTE — PHYSICAL EXAM
[No Acute Distress] : no acute distress [Well Nourished] : well nourished [No Respiratory Distress] : no respiratory distress  [Clear to Auscultation] : lungs were clear to auscultation bilaterally [No Accessory Muscle Use] : no accessory muscle use [Normal Rate] : normal rate  [Regular Rhythm] : with a regular rhythm [Normal S1, S2] : normal S1 and S2 [Speech Grossly Normal] : speech grossly normal [Memory Grossly Normal] : memory grossly normal [Normal Mood] : the mood was normal [Normal Insight/Judgement] : insight and judgment were intact

## 2019-01-22 NOTE — HISTORY OF PRESENT ILLNESS
[de-identified] : CC: Follow up of Hypertension and other medical problems\par \par HPI:\par This is a 59 year male here for evaluation of:\par \par Hypertension:\par Patient is taking his medication properly and is not having any side effects.\par \par CLL is stable.\par \par CLL is in remission

## 2019-01-29 ENCOUNTER — APPOINTMENT (OUTPATIENT)
Dept: HEMATOLOGY ONCOLOGY | Facility: CLINIC | Age: 59
End: 2019-01-29
Payer: COMMERCIAL

## 2019-01-29 ENCOUNTER — LABORATORY RESULT (OUTPATIENT)
Age: 59
End: 2019-01-29

## 2019-01-29 VITALS
OXYGEN SATURATION: 98 % | DIASTOLIC BLOOD PRESSURE: 80 MMHG | WEIGHT: 202 LBS | HEART RATE: 76 BPM | SYSTOLIC BLOOD PRESSURE: 140 MMHG | BODY MASS INDEX: 30.71 KG/M2

## 2019-01-29 VITALS — TEMPERATURE: 97.7 F

## 2019-01-29 PROCEDURE — 85025 COMPLETE CBC W/AUTO DIFF WBC: CPT

## 2019-01-29 PROCEDURE — 99215 OFFICE O/P EST HI 40 MIN: CPT

## 2019-01-29 NOTE — PHYSICAL EXAM
[Fully active, able to carry on all pre-disease performance without restriction] : Status 0 - Fully active, able to carry on all pre-disease performance without restriction [Normal] : affect appropriate [de-identified] : EOMI, anicteric sclera, conjunctiva normal [de-identified] : MMM, +blisters on lips, no bleeding [de-identified] : no c/c/e [de-identified] : soft; NT/ND; NABS; no hepatosplenomegaly [de-identified] : no bilateral cervical, no bilateral supraclavicular, no bilateral axillary, and no bilateral inguinal lymphadenopathy palpated

## 2019-01-29 NOTE — HISTORY OF PRESENT ILLNESS
[Date: ____________] : Patient's last distress assessment performed on [unfilled]. [0 - No Distress] : Distress Level: 0 [Patient given social work contact information and resource sheet] : Patient was given social work contact information and resource sheet [de-identified] : Mr. Don is a 59 year old male with CLL (diagnosed 2014) who presents for follow-up on Cycle 20  [id number]. \par He reports feeling well overall today. No fevers or drenching night sweats. Good appetite. Weight overall stable. Normal bowel movements. No chest pain or shortness of breath. No palpitations. Energy stable. No infections currently. Blister on his lips have resolved. He had a cold a while back since last visit and took a z-pack which helped resolve his cold. No changes in medications. \par \par  [de-identified] : CLL Markers:\par CD38+, ZAP70+\par unmutated IGHV\par TP53 mutation [FreeTextEntry1] : [id number]

## 2019-01-29 NOTE — ASSESSMENT
[FreeTextEntry1] : Mr. Don is a 59 year old male with CLL diagnosed in 2014. 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 He presents for follow up on [id number]. This is cycle 20 visit\par  \par Plan:\par 1)CLL: counts stable, continue study drugs as per protocol. Patient given cycle 20 day #1 of study in clinic at 13:58 pm. He is due for CT abd/pelvis, neck, and chest with/without contrast on next visit 4/23/19 +/-3 days\par 2)hypertension: continue losartan 50mg daily as prescribed\par \par RTO per protocol, sooner if needed

## 2019-01-30 LAB
ALBUMIN SERPL ELPH-MCNC: 4.3 G/DL
ALP BLD-CCNC: 69 U/L
ALT SERPL-CCNC: 13 U/L
ANION GAP SERPL CALC-SCNC: 11 MMOL/L
AST SERPL-CCNC: 18 U/L
BILIRUB SERPL-MCNC: 0.3 MG/DL
BUN SERPL-MCNC: 20 MG/DL
CALCIUM SERPL-MCNC: 9.2 MG/DL
CHLORIDE SERPL-SCNC: 103 MMOL/L
CO2 SERPL-SCNC: 26 MMOL/L
CREAT SERPL-MCNC: 1.11 MG/DL
GLUCOSE SERPL-MCNC: 118 MG/DL
LDH SERPL-CCNC: 176 U/L
POTASSIUM SERPL-SCNC: 3.9 MMOL/L
PROT SERPL-MCNC: 6.9 G/DL
SODIUM SERPL-SCNC: 140 MMOL/L
URATE SERPL-MCNC: 5.5 MG/DL

## 2019-04-08 ENCOUNTER — OTHER (OUTPATIENT)
Age: 59
End: 2019-04-08

## 2019-04-19 ENCOUNTER — FORM ENCOUNTER (OUTPATIENT)
Age: 59
End: 2019-04-19

## 2019-04-19 ENCOUNTER — RX RENEWAL (OUTPATIENT)
Age: 59
End: 2019-04-19

## 2019-04-20 ENCOUNTER — APPOINTMENT (OUTPATIENT)
Dept: CT IMAGING | Facility: IMAGING CENTER | Age: 59
End: 2019-04-20
Payer: COMMERCIAL

## 2019-04-20 ENCOUNTER — OUTPATIENT (OUTPATIENT)
Dept: OUTPATIENT SERVICES | Facility: HOSPITAL | Age: 59
LOS: 1 days | End: 2019-04-20
Payer: COMMERCIAL

## 2019-04-20 DIAGNOSIS — Z00.8 ENCOUNTER FOR OTHER GENERAL EXAMINATION: ICD-10-CM

## 2019-04-20 PROCEDURE — 71260 CT THORAX DX C+: CPT | Mod: 26

## 2019-04-20 PROCEDURE — 71260 CT THORAX DX C+: CPT

## 2019-04-20 PROCEDURE — 70491 CT SOFT TISSUE NECK W/DYE: CPT | Mod: 26

## 2019-04-20 PROCEDURE — 74177 CT ABD & PELVIS W/CONTRAST: CPT | Mod: 26

## 2019-04-20 PROCEDURE — 82565 ASSAY OF CREATININE: CPT

## 2019-04-20 PROCEDURE — 70491 CT SOFT TISSUE NECK W/DYE: CPT

## 2019-04-20 PROCEDURE — 74177 CT ABD & PELVIS W/CONTRAST: CPT

## 2019-04-23 ENCOUNTER — OTHER (OUTPATIENT)
Age: 59
End: 2019-04-23

## 2019-04-23 ENCOUNTER — APPOINTMENT (OUTPATIENT)
Dept: HEMATOLOGY ONCOLOGY | Facility: CLINIC | Age: 59
End: 2019-04-23
Payer: COMMERCIAL

## 2019-04-23 VITALS — OXYGEN SATURATION: 99 % | WEIGHT: 205 LBS | BODY MASS INDEX: 31.17 KG/M2 | HEART RATE: 58 BPM

## 2019-04-23 VITALS — TEMPERATURE: 97.7 F | SYSTOLIC BLOOD PRESSURE: 140 MMHG | DIASTOLIC BLOOD PRESSURE: 80 MMHG

## 2019-04-23 PROCEDURE — 85025 COMPLETE CBC W/AUTO DIFF WBC: CPT

## 2019-04-23 PROCEDURE — 99215 OFFICE O/P EST HI 40 MIN: CPT

## 2019-04-24 LAB
ALBUMIN SERPL ELPH-MCNC: 4.2 G/DL
ALP BLD-CCNC: 79 U/L
ALT SERPL-CCNC: 17 U/L
ANION GAP SERPL CALC-SCNC: 14 MMOL/L
AST SERPL-CCNC: 22 U/L
BILIRUB SERPL-MCNC: 0.3 MG/DL
BUN SERPL-MCNC: 17 MG/DL
CALCIUM SERPL-MCNC: 8.9 MG/DL
CHLORIDE SERPL-SCNC: 103 MMOL/L
CO2 SERPL-SCNC: 24 MMOL/L
CREAT SERPL-MCNC: 1.16 MG/DL
GLUCOSE SERPL-MCNC: 100 MG/DL
LDH SERPL-CCNC: 170 U/L
MAGNESIUM SERPL-MCNC: 2.1 MG/DL
PHOSPHATE SERPL-MCNC: 3.2 MG/DL
POTASSIUM SERPL-SCNC: 4.5 MMOL/L
PROT SERPL-MCNC: 7 G/DL
SODIUM SERPL-SCNC: 140 MMOL/L
URATE SERPL-MCNC: 4.8 MG/DL

## 2019-05-03 ENCOUNTER — OTHER (OUTPATIENT)
Age: 59
End: 2019-05-03

## 2019-05-06 ENCOUNTER — OTHER (OUTPATIENT)
Age: 59
End: 2019-05-06

## 2019-05-13 ENCOUNTER — APPOINTMENT (OUTPATIENT)
Dept: INTERNAL MEDICINE | Facility: CLINIC | Age: 59
End: 2019-05-13
Payer: COMMERCIAL

## 2019-05-13 VITALS
HEART RATE: 60 BPM | WEIGHT: 205 LBS | SYSTOLIC BLOOD PRESSURE: 144 MMHG | DIASTOLIC BLOOD PRESSURE: 89 MMHG | BODY MASS INDEX: 31.07 KG/M2 | HEIGHT: 68 IN

## 2019-05-13 PROCEDURE — 99214 OFFICE O/P EST MOD 30 MIN: CPT | Mod: 25

## 2019-05-13 PROCEDURE — 36415 COLL VENOUS BLD VENIPUNCTURE: CPT

## 2019-05-13 RX ORDER — LOSARTAN POTASSIUM 50 MG/1
50 TABLET, FILM COATED ORAL
Qty: 30 | Refills: 10 | Status: DISCONTINUED | COMMUNITY
Start: 2019-04-19 | End: 2019-05-13

## 2019-05-13 RX ORDER — RANITIDINE HCL 150 MG
150 CAPSULE ORAL TWICE DAILY
Refills: 0 | Status: DISCONTINUED | COMMUNITY
End: 2019-05-13

## 2019-05-13 NOTE — PHYSICAL EXAM
[No Acute Distress] : no acute distress [Well Nourished] : well nourished [Well Developed] : well developed [No JVD] : no jugular venous distention [Supple] : supple [No Respiratory Distress] : no respiratory distress  [Clear to Auscultation] : lungs were clear to auscultation bilaterally [Normal Rate] : normal rate  [Regular Rhythm] : with a regular rhythm [Soft] : abdomen soft [Normal S1, S2] : normal S1 and S2 [Non Tender] : non-tender [No HSM] : no HSM [Normal Supraclavicular Nodes] : no supraclavicular lymphadenopathy [Normal Axillary Nodes] : no axillary lymphadenopathy [Normal Anterior Cervical Nodes] : no anterior cervical lymphadenopathy [Normal Inguinal Nodes] : no inguinal lymphadenopathy

## 2019-05-14 LAB
ALBUMIN SERPL ELPH-MCNC: 4.4 G/DL
ALP BLD-CCNC: 72 U/L
ALT SERPL-CCNC: 24 U/L
ANION GAP SERPL CALC-SCNC: 12 MMOL/L
APPEARANCE: CLEAR
AST SERPL-CCNC: 23 U/L
BACTERIA: NEGATIVE
BASOPHILS # BLD AUTO: 0.06 K/UL
BASOPHILS NFR BLD AUTO: 0.8 %
BILIRUB SERPL-MCNC: 0.4 MG/DL
BILIRUBIN URINE: NEGATIVE
BLOOD URINE: NEGATIVE
BUN SERPL-MCNC: 15 MG/DL
CALCIUM SERPL-MCNC: 9.4 MG/DL
CHLORIDE SERPL-SCNC: 103 MMOL/L
CHOLEST SERPL-MCNC: 212 MG/DL
CHOLEST/HDLC SERPL: 4.2 RATIO
CO2 SERPL-SCNC: 23 MMOL/L
COLOR: NORMAL
CREAT SERPL-MCNC: 1.1 MG/DL
EOSINOPHIL # BLD AUTO: 0.09 K/UL
EOSINOPHIL NFR BLD AUTO: 1.2 %
ESTIMATED AVERAGE GLUCOSE: 120 MG/DL
GLUCOSE QUALITATIVE U: NEGATIVE
GLUCOSE SERPL-MCNC: 90 MG/DL
HBA1C MFR BLD HPLC: 5.8 %
HCT VFR BLD CALC: 44.9 %
HDLC SERPL-MCNC: 51 MG/DL
HGB BLD-MCNC: 14.6 G/DL
HYALINE CASTS: 0 /LPF
IMM GRANULOCYTES NFR BLD AUTO: 0.5 %
KETONES URINE: NEGATIVE
LDLC SERPL CALC-MCNC: 123 MG/DL
LEUKOCYTE ESTERASE URINE: NEGATIVE
LYMPHOCYTES # BLD AUTO: 1.83 K/UL
LYMPHOCYTES NFR BLD AUTO: 23.4 %
MAN DIFF?: NORMAL
MCHC RBC-ENTMCNC: 29.5 PG
MCHC RBC-ENTMCNC: 32.5 GM/DL
MCV RBC AUTO: 90.7 FL
MICROSCOPIC-UA: NORMAL
MONOCYTES # BLD AUTO: 0.7 K/UL
MONOCYTES NFR BLD AUTO: 9 %
NEUTROPHILS # BLD AUTO: 5.1 K/UL
NEUTROPHILS NFR BLD AUTO: 65.1 %
NITRITE URINE: NEGATIVE
PH URINE: 6.5
PLATELET # BLD AUTO: 165 K/UL
POTASSIUM SERPL-SCNC: 4.1 MMOL/L
PROT SERPL-MCNC: 7.3 G/DL
PROTEIN URINE: NEGATIVE
PSA FREE SERPL-MCNC: 1.04 NG/ML
PSA SERPL-MCNC: 8.68 NG/ML
RBC # BLD: 4.95 M/UL
RBC # FLD: 13.2 %
RED BLOOD CELLS URINE: 1 /HPF
SAVE SPECIMEN: NORMAL
SODIUM SERPL-SCNC: 138 MMOL/L
SPECIFIC GRAVITY URINE: 1.02
SQUAMOUS EPITHELIAL CELLS: 0 /HPF
T4 SERPL-MCNC: 6.4 UG/DL
TRIGL SERPL-MCNC: 188 MG/DL
TSH SERPL-ACNC: 9.41 UIU/ML
URATE SERPL-MCNC: 5.7 MG/DL
UROBILINOGEN URINE: NORMAL
WBC # FLD AUTO: 7.82 K/UL
WHITE BLOOD CELLS URINE: 1 /HPF

## 2019-05-14 NOTE — ASSESSMENT
[FreeTextEntry1] : Elevated PSA:\par Repeat PSA\par \par CLL:\par Lipid profile\par \par Hypertension:\par Well controlled.\par Continue current medication.\par Low salt diet.\par \par Lipid profile

## 2019-05-14 NOTE — HISTORY OF PRESENT ILLNESS
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

## 2019-05-14 NOTE — REVIEW OF SYSTEMS
[Chills] : no chills [Fever] : no fever [Night Sweats] : no night sweats [Earache] : no earache [Chest Pain] : no chest pain [Nasal Discharge] : no nasal discharge [Orthopena] : no orthopnea [Shortness Of Breath] : no shortness of breath [Wheezing] : no wheezing [Abdominal Pain] : no abdominal pain [Vomiting] : no vomiting

## 2019-05-20 NOTE — RESULTS/DATA
[FreeTextEntry1] : CBC done 19\par WBC: 6.8\par A.65\par ANC: 4.47\par Hgb: 14.0\par Hct: 41.7\par Plt: 141.0

## 2019-05-20 NOTE — HISTORY OF PRESENT ILLNESS
[de-identified] : Mr. Don is a 59 year old male with CLL (diagnosed 2014) who presents for follow-up on Cycle 23 [id number]. \par He reports feeling well overall today. No fevers or drenching night sweats. Good appetite. Weight overall stable. Normal bowel movements. No chest pain or shortness of breath. No palpitations. Energy stable. No infections currently. No changes in medications. \par \par  [de-identified] : CLL Markers:\par CD38+, ZAP70+\par unmutated IGHV\par TP53 mutation [FreeTextEntry1] : [id number]

## 2019-05-20 NOTE — PHYSICAL EXAM
[Fully active, able to carry on all pre-disease performance without restriction] : Status 0 - Fully active, able to carry on all pre-disease performance without restriction [Normal] : affect appropriate [de-identified] : EOMI, anicteric sclera, conjunctiva normal [de-identified] : DONALDO [de-identified] : no c/c/e [de-identified] : soft; NT/ND; NABS; no hepatosplenomegaly [de-identified] : no bilateral cervical, no bilateral supraclavicular, no bilateral axillary, and no bilateral inguinal lymphadenopathy palpated

## 2019-06-14 ENCOUNTER — APPOINTMENT (OUTPATIENT)
Dept: UROLOGY | Facility: CLINIC | Age: 59
End: 2019-06-14
Payer: COMMERCIAL

## 2019-06-14 VITALS
HEART RATE: 57 BPM | RESPIRATION RATE: 14 BRPM | HEIGHT: 68 IN | WEIGHT: 200 LBS | DIASTOLIC BLOOD PRESSURE: 98 MMHG | SYSTOLIC BLOOD PRESSURE: 157 MMHG | TEMPERATURE: 97.9 F | BODY MASS INDEX: 30.31 KG/M2

## 2019-06-14 DIAGNOSIS — R97.20 ELEVATED PROSTATE, SPECIFIC ANTIGEN [PSA]: ICD-10-CM

## 2019-06-14 PROCEDURE — 99213 OFFICE O/P EST LOW 20 MIN: CPT

## 2019-06-14 NOTE — REVIEW OF SYSTEMS
[Eyesight Problems] : eyesight problems [Heart Rate Is Fast] : fast heart rate [Heartburn] : heartburn [Wake up at night to urinate  How many times?  ___] : wakes up to urinate [unfilled] times during the night [Skin Lesions] : skin lesion [Slow urine stream] : slow urine stream [Negative] : Heme/Lymph

## 2019-06-14 NOTE — HISTORY OF PRESENT ILLNESS
[FreeTextEntry1] : 60 y/o M h/o CLL here for evaluation of elevated PSA. Started with surveillance for a long time already but started to go  beyond 4.4 this year back in 4/2019. he had a repeat which jumped up to 8.12. From a urinary standpoint, he has little bother AUASS of 7, feelings of weak urinary stream, intermittency, urgency. He denies having dysuria.\par \par Denies F/C, N/V, CP, SOB, abd pain, dysuria.

## 2019-06-14 NOTE — ASSESSMENT
[FreeTextEntry1] : 58 y/o M w/ elevated PSA here for evaluation\par - quite a large jump from 4.4 in 4/2019 to 8.12 now, will repeat PSA first\par - if elevated, then will get MRI\par - f/u pending findings

## 2019-06-14 NOTE — PHYSICAL EXAM
[Edema] : no peripheral edema [] : no respiratory distress [Exaggerated Use Of Accessory Muscles For Inspiration] : no accessory muscle use [FreeTextEntry1] : soft, nondistended [Normal Station and Gait] : the gait and station were normal for the patient's age [No Focal Deficits] : no focal deficits

## 2019-06-18 ENCOUNTER — EMERGENCY (EMERGENCY)
Facility: HOSPITAL | Age: 59
LOS: 1 days | Discharge: HOME | End: 2019-06-18
Attending: EMERGENCY MEDICINE
Payer: COMMERCIAL

## 2019-06-18 VITALS
RESPIRATION RATE: 20 BRPM | WEIGHT: 199.96 LBS | TEMPERATURE: 98 F | SYSTOLIC BLOOD PRESSURE: 176 MMHG | DIASTOLIC BLOOD PRESSURE: 110 MMHG | OXYGEN SATURATION: 98 % | HEART RATE: 58 BPM | HEIGHT: 68 IN

## 2019-06-18 DIAGNOSIS — Z98.890 OTHER SPECIFIED POSTPROCEDURAL STATES: Chronic | ICD-10-CM

## 2019-06-18 LAB
ALBUMIN SERPL ELPH-MCNC: 4.3 G/DL — SIGNIFICANT CHANGE UP (ref 3.3–5.2)
ALP SERPL-CCNC: 80 U/L — SIGNIFICANT CHANGE UP (ref 40–120)
ALT FLD-CCNC: 19 U/L — SIGNIFICANT CHANGE UP
ANION GAP SERPL CALC-SCNC: 14 MMOL/L — SIGNIFICANT CHANGE UP (ref 5–17)
APTT BLD: 30.4 SEC — SIGNIFICANT CHANGE UP (ref 27.5–36.3)
AST SERPL-CCNC: 22 U/L — SIGNIFICANT CHANGE UP
BASOPHILS # BLD AUTO: 0 K/UL — SIGNIFICANT CHANGE UP (ref 0–0.2)
BASOPHILS NFR BLD AUTO: 0.4 % — SIGNIFICANT CHANGE UP (ref 0–2)
BILIRUB SERPL-MCNC: 0.3 MG/DL — LOW (ref 0.4–2)
BUN SERPL-MCNC: 17 MG/DL — SIGNIFICANT CHANGE UP (ref 8–20)
CALCIUM SERPL-MCNC: 9 MG/DL — SIGNIFICANT CHANGE UP (ref 8.6–10.2)
CHLORIDE SERPL-SCNC: 104 MMOL/L — SIGNIFICANT CHANGE UP (ref 98–107)
CO2 SERPL-SCNC: 23 MMOL/L — SIGNIFICANT CHANGE UP (ref 22–29)
CREAT SERPL-MCNC: 1.01 MG/DL — SIGNIFICANT CHANGE UP (ref 0.5–1.3)
EOSINOPHIL # BLD AUTO: 0.1 K/UL — SIGNIFICANT CHANGE UP (ref 0–0.5)
EOSINOPHIL NFR BLD AUTO: 1.8 % — SIGNIFICANT CHANGE UP (ref 0–5)
GLUCOSE SERPL-MCNC: 90 MG/DL — SIGNIFICANT CHANGE UP (ref 70–115)
HCT VFR BLD CALC: 42.1 % — SIGNIFICANT CHANGE UP (ref 42–52)
HGB BLD-MCNC: 14.5 G/DL — SIGNIFICANT CHANGE UP (ref 14–18)
INR BLD: 0.83 RATIO — LOW (ref 0.88–1.16)
LYMPHOCYTES # BLD AUTO: 2 K/UL — SIGNIFICANT CHANGE UP (ref 1–4.8)
LYMPHOCYTES # BLD AUTO: 28.4 % — SIGNIFICANT CHANGE UP (ref 20–55)
MCHC RBC-ENTMCNC: 30.1 PG — SIGNIFICANT CHANGE UP (ref 27–31)
MCHC RBC-ENTMCNC: 34.4 G/DL — SIGNIFICANT CHANGE UP (ref 32–36)
MCV RBC AUTO: 87.3 FL — SIGNIFICANT CHANGE UP (ref 80–94)
MONOCYTES # BLD AUTO: 0.6 K/UL — SIGNIFICANT CHANGE UP (ref 0–0.8)
MONOCYTES NFR BLD AUTO: 9 % — SIGNIFICANT CHANGE UP (ref 3–10)
NEUTROPHILS # BLD AUTO: 4.3 K/UL — SIGNIFICANT CHANGE UP (ref 1.8–8)
NEUTROPHILS NFR BLD AUTO: 60.1 % — SIGNIFICANT CHANGE UP (ref 37–73)
PLATELET # BLD AUTO: 186 K/UL — SIGNIFICANT CHANGE UP (ref 150–400)
POTASSIUM SERPL-MCNC: 4.2 MMOL/L — SIGNIFICANT CHANGE UP (ref 3.5–5.3)
POTASSIUM SERPL-SCNC: 4.2 MMOL/L — SIGNIFICANT CHANGE UP (ref 3.5–5.3)
PROT SERPL-MCNC: 7.5 G/DL — SIGNIFICANT CHANGE UP (ref 6.6–8.7)
PROTHROM AB SERPL-ACNC: 9.5 SEC — LOW (ref 10–12.9)
RBC # BLD: 4.82 M/UL — SIGNIFICANT CHANGE UP (ref 4.6–6.2)
RBC # FLD: 13.7 % — SIGNIFICANT CHANGE UP (ref 11–15.6)
SODIUM SERPL-SCNC: 141 MMOL/L — SIGNIFICANT CHANGE UP (ref 135–145)
TROPONIN T SERPL-MCNC: <0.01 NG/ML — SIGNIFICANT CHANGE UP (ref 0–0.06)
WBC # BLD: 7.1 K/UL — SIGNIFICANT CHANGE UP (ref 4.8–10.8)
WBC # FLD AUTO: 7.1 K/UL — SIGNIFICANT CHANGE UP (ref 4.8–10.8)

## 2019-06-18 PROCEDURE — 99218: CPT

## 2019-06-18 PROCEDURE — 93010 ELECTROCARDIOGRAM REPORT: CPT

## 2019-06-18 PROCEDURE — 70450 CT HEAD/BRAIN W/O DYE: CPT | Mod: 26

## 2019-06-18 RX ORDER — ASPIRIN/CALCIUM CARB/MAGNESIUM 324 MG
81 TABLET ORAL DAILY
Refills: 0 | Status: DISCONTINUED | OUTPATIENT
Start: 2019-06-18 | End: 2019-06-23

## 2019-06-18 RX ORDER — ONDANSETRON 8 MG/1
4 TABLET, FILM COATED ORAL EVERY 6 HOURS
Refills: 0 | Status: DISCONTINUED | OUTPATIENT
Start: 2019-06-18 | End: 2019-06-23

## 2019-06-18 NOTE — ED CDU PROVIDER INITIAL DAY NOTE - CRANIAL NERVE AND PUPILLARY EXAM
central vision intact/cranial nerves 2-12 intact/peripheral vision intact/extra-ocular movements intact

## 2019-06-18 NOTE — ED PROVIDER NOTE - CLINICAL SUMMARY MEDICAL DECISION MAKING FREE TEXT BOX
Priority CT head ordered. Pt with likely TIA. Will admit pt to observation for neuro eval/MRI in AM.

## 2019-06-18 NOTE — ED ADULT NURSE NOTE - OBJECTIVE STATEMENT
At 2115,onset of dizziness and tingling to right arm and hand difficulty walking on right leg.  pt able to walk since s/s onset with unsteady gait.  Pt then was sitting in recliner chair at home when he decided to come to ED.  Pt with CLL, on clinical trial of Imbruvika with Dr. Butler ([facility]).

## 2019-06-18 NOTE — ED STATDOCS - PROGRESS NOTE DETAILS
60 y/o M pt presents to ED c/o dizziness, numbness in right arm. Indicates at 9:15PM tonight was able to drive home from work, but once he got home had an unsteady gait.  Currently only complains of mild right sided numbness. Code Stroke Called. Patient signed out to Dr. Reed.

## 2019-06-18 NOTE — ED ADULT NURSE NOTE - INTERVENTIONS DEFINITIONS
Man to call system/Call bell, personal items and telephone within reach/Stretcher in lowest position, wheels locked, appropriate side rails in place

## 2019-06-18 NOTE — ED CDU PROVIDER INITIAL DAY NOTE - OBJECTIVE STATEMENT
A 59 year old male pt with a hx of CLL, treated for past two years on clinical trial at [facility], presents to the ED c/o weakness, numbness. The pt states that around 9 PM today he began to feel weakness in his right arm, numbness to the right side of his face, dand slight difficulty walking secondary to dizziness. Pt's wife called EMS and pt was BIBA to the ED. In the ED, pt states that his symptoms have mostly resolved since arrival. he denies any vision loss or speech changes, CP or SOB. Pt is not on aspirin due to current cancer treatment. Code: stroke activated by Intake physician, Dr. Gonzalez, in ED, A 59 year old male pt with a hx of CLL, treated for past two years on clinical trial at [facility] presents to the ED c/o weakness, numbness. The pt states that around 9 PM today he began to feel weakness in his right arm, numbness to the right side of his face, and slight difficulty walking secondary to dizziness. Pt's wife called EMS and pt was BIBA to the ED. In the ED, pt states that his symptoms have mostly resolved since arrival. he denies any vision loss or speech changes, CP or SOB. Pt is not on aspirin due to current cancer treatment. Code: stroke activated by Intake physician, Dr. Gonzalez, in ED,

## 2019-06-18 NOTE — ED ADULT TRIAGE NOTE - CHIEF COMPLAINT QUOTE
Pt c/o right sided weakness, numbness.  Onset approx 2115 tonight.  No obvious facial droop or neuro deficits.  States had difficulty ambulating when episode occurred.

## 2019-06-18 NOTE — ED PROVIDER NOTE - OBJECTIVE STATEMENT
A 59 year old male pt with a hx of CLL, treated for past two years on clincal trial at [facility], presents to the ED c/o weakness, numbness. The pt states that around 9 PM today he began to feel weakness in his right arm, numbness to the right side of his face, and slight difficulty walking secondary to weakness. A 59 year old male pt with a hx of CLL, treated for past two years on clinical trial at [facility], presents to the ED c/o weakness, numbness. The pt states that around 9 PM today he began to feel weakness in his right arm, numbness to the right side of his face, dand slight difficulty walking secondary to dizziness. Pt's wife called EMS and pt was BIBA to the ED. In the ED, pt states that his symptoms have mostly resolved since arrival. he denies any vision loss or speech changes, CP or SOB. Pt is not on aspirin due to current cancer treatment. Code: stroke activated by Intake physician, Dr. Gonzalez, in ED,

## 2019-06-18 NOTE — ED PROVIDER NOTE - PROGRESS NOTE DETAILS
Patient stable. Given normal examination on evaluation patient is not a TPA candidate. Will place in obs of neuro consultation and continued work-up.

## 2019-06-18 NOTE — ED PROVIDER NOTE - CRANIAL NERVE AND PUPILLARY EXAM
extra-ocular movements intact/central vision intact/peripheral vision intact/cranial nerves 2-12 intact

## 2019-06-19 VITALS
RESPIRATION RATE: 20 BRPM | OXYGEN SATURATION: 99 % | SYSTOLIC BLOOD PRESSURE: 146 MMHG | TEMPERATURE: 98 F | DIASTOLIC BLOOD PRESSURE: 96 MMHG | HEART RATE: 66 BPM

## 2019-06-19 PROCEDURE — 36415 COLL VENOUS BLD VENIPUNCTURE: CPT

## 2019-06-19 PROCEDURE — 85610 PROTHROMBIN TIME: CPT

## 2019-06-19 PROCEDURE — 99217: CPT

## 2019-06-19 PROCEDURE — 70547 MR ANGIOGRAPHY NECK W/O DYE: CPT

## 2019-06-19 PROCEDURE — 70551 MRI BRAIN STEM W/O DYE: CPT | Mod: 26

## 2019-06-19 PROCEDURE — 82550 ASSAY OF CK (CPK): CPT

## 2019-06-19 PROCEDURE — 93880 EXTRACRANIAL BILAT STUDY: CPT | Mod: 26

## 2019-06-19 PROCEDURE — 93306 TTE W/DOPPLER COMPLETE: CPT | Mod: 26

## 2019-06-19 PROCEDURE — 70551 MRI BRAIN STEM W/O DYE: CPT

## 2019-06-19 PROCEDURE — 70544 MR ANGIOGRAPHY HEAD W/O DYE: CPT

## 2019-06-19 PROCEDURE — 93005 ELECTROCARDIOGRAM TRACING: CPT

## 2019-06-19 PROCEDURE — 85730 THROMBOPLASTIN TIME PARTIAL: CPT

## 2019-06-19 PROCEDURE — 84484 ASSAY OF TROPONIN QUANT: CPT

## 2019-06-19 PROCEDURE — 99291 CRITICAL CARE FIRST HOUR: CPT | Mod: 25

## 2019-06-19 PROCEDURE — 80053 COMPREHEN METABOLIC PANEL: CPT

## 2019-06-19 PROCEDURE — 85027 COMPLETE CBC AUTOMATED: CPT

## 2019-06-19 PROCEDURE — 93306 TTE W/DOPPLER COMPLETE: CPT

## 2019-06-19 PROCEDURE — 93880 EXTRACRANIAL BILAT STUDY: CPT

## 2019-06-19 PROCEDURE — 82962 GLUCOSE BLOOD TEST: CPT

## 2019-06-19 PROCEDURE — 70544 MR ANGIOGRAPHY HEAD W/O DYE: CPT | Mod: 26,59

## 2019-06-19 PROCEDURE — 70547 MR ANGIOGRAPHY NECK W/O DYE: CPT | Mod: 26

## 2019-06-19 PROCEDURE — G0378: CPT

## 2019-06-19 PROCEDURE — 70450 CT HEAD/BRAIN W/O DYE: CPT

## 2019-06-19 RX ORDER — LOSARTAN POTASSIUM 100 MG/1
50 TABLET, FILM COATED ORAL DAILY
Refills: 0 | Status: DISCONTINUED | OUTPATIENT
Start: 2019-06-19 | End: 2019-06-23

## 2019-06-19 RX ADMIN — LOSARTAN POTASSIUM 50 MILLIGRAM(S): 100 TABLET, FILM COATED ORAL at 05:20

## 2019-06-19 RX ADMIN — Medication 81 MILLIGRAM(S): at 03:16

## 2019-06-19 RX ADMIN — Medication 81 MILLIGRAM(S): at 16:07

## 2019-06-19 NOTE — ED CDU PROVIDER SUBSEQUENT DAY NOTE - ATTENDING CONTRIBUTION TO CARE
59 year old hx of CLL c/o dizziness and right sided weakness with numbness now resolved.  Patient is a non-smoker.  MRI negative for acute stroke.  Patient noted to have alteral ischemia on EKG cardiology consulted.  Patient signed out to relieving physician pending MRI and cardiology consult.

## 2019-06-19 NOTE — ED CDU PROVIDER DISPOSITION NOTE - ATTENDING CONTRIBUTION TO CARE
I, Nolberto Canas, have personally performed a face to face diagnostic evaluation on this patient. I have reviewed the ACP note and agree with the history, exam and plan of care, except as noted.    60 yo F hx of CLL p/w weakness and numbness to right side of face that resolved. Patient in Obs for TIA work up. CT head wnl. CT chest/abdomen/pelvis wnl. MR head, MRA head and neck showed no ansurysm. TTE wnl.  Patient cleared to be discharged.

## 2019-06-19 NOTE — ED CDU PROVIDER SUBSEQUENT DAY NOTE - PROGRESS NOTE DETAILS
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 Unable to see echo report but spoke to cardioloigst Dr. Torres over the phone who informed that EF about 70% with mild left atrial enlargement and mild LVH, no effusion, pt advised of results and will f/u with outpatient.

## 2019-06-19 NOTE — ED CDU PROVIDER DISPOSITION NOTE - CLINICAL COURSE
59 year old male pt with a hx of CLL, treated for past two years on clinical trial at [facility], presents to the ED c/o weakness, numbness. The pt states that around 9 PM yesterday he began to feel weakness in his right arm, numbness to the right side of his face, dand slight difficulty walking secondary to dizziness. Pt's wife called EMS and pt was BIBA to the ED. In the ED, pt states that his symptoms have mostly resolved since arrival. he denies any vision loss or speech changes, CP or SOB. Pt is not on aspirin due to current cancer treatment. Pt symptoms have since resolves.  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 Unable to see echo report but spoke to cardioloigst Dr. Torres over the phone who informed that EF about 70% with mild left atrial enlargement and mild LVH, no effusion, pt advised of results and will f/u with outpatient.

## 2019-06-19 NOTE — ED CDU PROVIDER SUBSEQUENT DAY NOTE - NS ED ROS FT
· CONSTITUTIONAL: no fever and no chills.  · EYES: no discharge, no irritation, no pain, no redness, and no visual changes.  · ENMT: Ears: no ear pain and no hearing problems.Nose: no nasal congestion and no nasal drainage.Mouth/Throat: no dysphagia, no hoarseness and no throat pain.Neck: no lumps, no pain, no stiffness and no swollen glands  · CARDIOVASCULAR: no chest pain and no edema.  · RESPIRATORY: no chest pain, no cough, and no shortness of breath.  · GASTROINTESTINAL: no abdominal pain, no bloating, no constipation, no diarrhea, no nausea and no vomiting.  · GENITOURINARY: no dysuria, no frequency, and no hematuria.  · MUSCULOSKELETAL: no back pain, no gout, no musculoskeletal pain, no neck pain, and no weakness.  · SKIN: no abrasions, no jaundice, no lesions, no pruritis, and no rashes.  · NEUROLOGICAL: - - -  · Neurological [+]: DIZZINESS, WEAKNESS, NUMBNESS

## 2019-06-19 NOTE — CONSULT NOTE ADULT - SUBJECTIVE AND OBJECTIVE BOX
[place] [facility]                                   [address] [place] [address]                                                      PHONE: [phone]                                                         FAX: [phone]  [email or web address]  ---------------------------------------------------------------------------------------------------------------------------------    Reason for Consult: right arm paresthesias  HPI:  MATY WESLEY is an 59y Male PMHx CLL in clinical trial, HTN, obesity, works as  and yesterday did not eat or drink much and while at work sitting he experienced lightheadedness with associated mild right arm paresthesias.  He notes this was worse with standing from a seated position.    PAST MEDICAL & SURGICAL HISTORY:  Hypertension  CLL (chronic lymphocytic leukemia)  H/O hernia repair      No Known Allergies      MEDICATIONS  (STANDING):  aspirin enteric coated 81 milliGRAM(s) Oral daily  losartan 50 milliGRAM(s) Oral daily    MEDICATIONS  (PRN):  ondansetron Injectable 4 milliGRAM(s) IV Push every 6 hours PRN Nausea and/or Vomiting      Social History:  Cigarettes:        no            Alchohol:     no            Illicit Drug Abuse:  no  FHx NC  ROS: Negative other than as mentioned in HPI.    Vital Signs Last 24 Hrs  T(C): 36.9 (19 Jun 2019 08:06), Max: 37.1 (19 Jun 2019 03:18)  T(F): 98.5 (19 Jun 2019 08:06), Max: 98.8 (19 Jun 2019 03:18)  HR: 51 (19 Jun 2019 08:06) (51 - 61)  BP: 117/58 (19 Jun 2019 08:06) (110/68 - 176/110)  BP(mean): --  RR: 18 (19 Jun 2019 08:06) (16 - 20)  SpO2: 97% (19 Jun 2019 08:06) (95% - 99%)  ICU Vital Signs Last 24 Hrs  MATY WESLEY  I&O's Detail    I&O's Summary    Drug Dosing Weight  MATY WESLEY      PHYSICAL EXAM:  General: Appears well developed, well nourished alert and cooperative.  HEENT: Head; normocephalic, atraumatic.  Eyes: Pupils reactive, cornea wnl.  Neck: Supple, no nodes adenopathy, no NVD or carotid bruit or thyromegaly.  CARDIOVASCULAR: Normal S1 and S2, No murmur, rub, gallop or lift.   LUNGS: No rales, rhonchi or wheeze. Normal breath sounds bilaterally.  ABDOMEN: Soft, nontender without mass or organomegaly. bowel sounds normoactive.  EXTREMITIES: No clubbing, cyanosis or edema. Distal pulses wnl.   SKIN: warm and dry with normal turgor.  NEURO: Alert/oriented x 3/normal motor exam. No pathologic reflexes.    PSYCH: normal affect.        LABS:                        14.5   7.1   )-----------( 186      ( 18 Jun 2019 22:27 )             42.1     06-18    141  |  104  |  17.0  ----------------------------<  90  4.2   |  23.0  |  1.01    Ca    9.0      18 Jun 2019 22:27    TPro  7.5  /  Alb  4.3  /  TBili  0.3<L>  /  DBili  x   /  AST  22  /  ALT  19  /  AlkPhos  80  06-18    MATY WESLEY  CARDIAC MARKERS ( 18 Jun 2019 22:27 )  x     / <0.01 ng/mL / 105 U/L / x     / x          PT/INR - ( 18 Jun 2019 22:27 )   PT: 9.5 sec;   INR: 0.83 ratio         PTT - ( 18 Jun 2019 22:27 )  PTT:30.4 sec      RADIOLOGY & ADDITIONAL STUDIES:    INTERPRETATION OF TELEMETRY (personally reviewed): no events    ECG: NS @ 58 lateral T wave inversions    ECHO: pending     Assessment and Plan:  In summary, MATY WESLEY is an 59y Male with past medical history significant for CLL in clinical trial, HTN, obesity, works as  and yesterday did not eat or drink much and while at work sitting he experienced lightheadedness with associated mild right arm paresthesias.  He notes this was worse with standing from a seated position.    Possible TIA vs dehydration    1) ECHO  2) IV fluids  3) Neuro consult pending  4) Will follow

## 2019-06-19 NOTE — ED CDU PROVIDER SUBSEQUENT DAY NOTE - HISTORY
A 59 year old male pt with a hx of CLL, treated for past two years on clinical trial at [facility], presents to the ED c/o weakness, numbness. The pt states that around 9 PM yesterday he began to feel weakness in his right arm, numbness to the right side of his face, dand slight difficulty walking secondary to dizziness. Pt's wife called EMS and pt was BIBA to the ED. In the ED, pt states that his symptoms have mostly resolved since arrival. he denies any vision loss or speech changes, CP or SOB. Pt is not on aspirin due to current cancer treatment. Pt symptoms have since resolves. awaiting MRA head and neck Pt seen by neuro and cards. awaiting ECHO read.

## 2019-06-19 NOTE — CONSULT NOTE ADULT - SUBJECTIVE AND OBJECTIVE BOX
CHIEF COMPLAINT: TIA    HPI: 59yMale  · Chief Complaint: The patient is a 59y Male complaining of weakness.	  · HPI Objective Statement: A 59 year old male pt with a hx of CLL, treated for past two years on clinical trial at [facility], presents to the ED c/o weakness, numbness. The pt states that around 9 PM today he began to feel weakness in his right arm, numbness to the right side of his face, dand slight difficulty walking secondary to dizziness. Pt's wife called EMS and pt was BIBA to the ED. In the ED, pt states that his symptoms have mostly resolved since arrival. he denies any vision loss or speech changes, CP or SOB. Pt is not on aspirin due to current cancer treatment. Code: stroke activated by Intake physician, Dr. Gonzalez, in ED,	  · Presenting Symptoms: DIZZINESS, NUMBNESS, WEAKNESS	  · Negative Findings: no blurred vision, no change in level of consciousness, no confusion, no vomiting	  · Timing: resolved	  · Duration: today	  · Quality: numb	  · Severity: MODERATE	  · Aggravated Factors: none	  · Relieving Factors: none	      PAST MEDICAL & SURGICAL HISTORY:  Hypertension  CLL (chronic lymphocytic leukemia)  H/O hernia repair    MEDICATIONS  (STANDING):  aspirin enteric coated 81 milliGRAM(s) Oral daily  losartan 50 milliGRAM(s) Oral daily    MEDICATIONS  (PRN):  ondansetron Injectable 4 milliGRAM(s) IV Push every 6 hours PRN Nausea and/or Vomiting    Allergies    No Known Allergies    Intolerances        FAMILY HISTORY:          SOCIAL HISTORY:    Tobacco:    Alcohol:    Drugs:          REVIEW OF SYSTEMS:    Relevant systems are negative except as noted in the chart, HPI, and PMH      VITAL SIGNS:  Vital Signs Last 24 Hrs  T(C): 36.9 (19 Jun 2019 08:06), Max: 37.1 (19 Jun 2019 03:18)  T(F): 98.5 (19 Jun 2019 08:06), Max: 98.8 (19 Jun 2019 03:18)  HR: 51 (19 Jun 2019 08:06) (51 - 61)  BP: 117/58 (19 Jun 2019 08:06) (110/68 - 176/110)  BP(mean): --  RR: 18 (19 Jun 2019 08:06) (16 - 20)  SpO2: 97% (19 Jun 2019 08:06) (95% - 99%)    PHYSICAL EXAMINATION:    General: Well-developed, well nourished, in no acute distress.  Cardiac:  Regular rate and rhythm. No carotid bruits appreciated.  Eyes: Fundoscopic examination was deferred.  Neurologic:  - Mental Status:  Alert, awake, oriented to person, place, and time; Speech is fluent. Language is normal. Follows commands well.  Insight and knowledge appear appropriate.  Cranial Nerves II-XII:    II:  Visual acuity is normal for age ; Visual fields are full to confrontation; Pupils are equal, round, and reactive to light.  III, IV, VI:  Extraocular movements are intact without nystagmus.  V:  Facial sensation is intact in the V1-V3 distribution bilaterally.  VII:  Face is symmetric with normal eye closure and smile  VIII:  Hearing is grossly intact  IX, X, XII:  speech is clear  XI:  Head turning and shoulder shrug are intact.  - Motor:  Strength is 5/5 x 4.   There is no pronator drift. .  - Reflexes:  2+ and symmetric at the knees.  Plantar responses flexor.  - Sensory:  Symmetric to light touch  - Coordination:  Finger-nose-finger is normal. Rapid alternating hand and foot  movements are intact. Dexterity appears normal      LABS:                          14.5   7.1   )-----------( 186      ( 18 Jun 2019 22:27 )             42.1     18 Jun 2019 22:27    141    |  104    |  17.0   ----------------------------<  90     4.2     |  23.0   |  1.01     Ca    9.0        18 Jun 2019 22:27    TPro  7.5    /  Alb  4.3    /  TBili  0.3    /  DBili  x      /  AST  22     /  ALT  19     /  AlkPhos  80     18 Jun 2019 22:27    LIVER FUNCTIONS - ( 18 Jun 2019 22:27 )  Alb: 4.3 g/dL / Pro: 7.5 g/dL / ALK PHOS: 80 U/L / ALT: 19 U/L / AST: 22 U/L / GGT: x           PT/INR - ( 18 Jun 2019 22:27 )   PT: 9.5 sec;   INR: 0.83 ratio         PTT - ( 18 Jun 2019 22:27 )  PTT:30.4 sec      RADIOLOGY & ADDITIONAL STUDIES:        IMPRESSION:    < from: CT Brain Stroke Protocol (06.18.19 @ 22:00) >  INTRACRANIAL FINDINGS: There is age-related atrophy and chronic   microvascular ischemic change. No evidence for acute territorial infarct,   mass lesion, mass effect, or recent hemorrhage. The ventricles are normal   in size. There is no abnormal extra axial collection.    < end of copied text >    PLAN:  1.   2.   3.   4.  5. CHIEF COMPLAINT: TIA    HPI: 59yMale  · Chief Complaint: The patient is a 59y Male complaining of weakness.	  · HPI Objective Statement: A 59 year old male pt with a hx of CLL, treated for past two years on clinical trial at [facility], presents to the ED c/o weakness, numbness. The pt states that around 9 PM today he began to feel weakness in his right arm, numbness to the right side of his face, dand slight difficulty walking secondary to dizziness. Pt's wife called EMS and pt was BIBA to the ED. In the ED, pt states that his symptoms have mostly resolved since arrival. he denies any vision loss or speech changes, CP or SOB. Pt is not on aspirin due to current cancer treatment. Code: stroke activated by Intake physician, Dr. Gonzalez, in ED,	  · Presenting Symptoms: DIZZINESS, NUMBNESS, WEAKNESS	  · Negative Findings: no blurred vision, no change in level of consciousness, no confusion, no vomiting	  · Timing: resolved	  · Duration: today	  · Quality: numb	  · Severity: MODERATE	  · Aggravated Factors: none	  · Relieving Factors: none	      PAST MEDICAL & SURGICAL HISTORY:  Hypertension  CLL (chronic lymphocytic leukemia)  H/O hernia repair    MEDICATIONS  (STANDING):  aspirin enteric coated 81 milliGRAM(s) Oral daily  losartan 50 milliGRAM(s) Oral daily    MEDICATIONS  (PRN):  ondansetron Injectable 4 milliGRAM(s) IV Push every 6 hours PRN Nausea and/or Vomiting    Allergies    No Known Allergies    Intolerances        FAMILY HISTORY:          SOCIAL HISTORY:    Tobacco:  no  Alcohol:  no  Drugs:  no        REVIEW OF SYSTEMS:    Relevant systems are negative except as noted in the chart, HPI, and PMH      VITAL SIGNS:  Vital Signs Last 24 Hrs  T(C): 36.9 (19 Jun 2019 08:06), Max: 37.1 (19 Jun 2019 03:18)  T(F): 98.5 (19 Jun 2019 08:06), Max: 98.8 (19 Jun 2019 03:18)  HR: 51 (19 Jun 2019 08:06) (51 - 61)  BP: 117/58 (19 Jun 2019 08:06) (110/68 - 176/110)  BP(mean): --  RR: 18 (19 Jun 2019 08:06) (16 - 20)  SpO2: 97% (19 Jun 2019 08:06) (95% - 99%)    PHYSICAL EXAMINATION:    General: Well-developed, well nourished, in no acute distress.  Cardiac:  Regular rate and rhythm. No carotid bruits appreciated.  Eyes: Fundoscopic examination was deferred.  Neurologic:  - Mental Status:  Alert, awake, oriented to person, place, and time; Speech is fluent. Language is normal. Follows commands well.  Insight and knowledge appear appropriate.  Cranial Nerves II-XII:    II:  Visual acuity is normal for age ; Visual fields are full to confrontation; Pupils are equal, round, and reactive to light.  III, IV, VI:  Extraocular movements are intact without nystagmus.  V:  Facial sensation is intact in the V1-V3 distribution bilaterally.  VII:  Face is symmetric with normal eye closure and smile  VIII:  Hearing is grossly intact  IX, X, XII:  speech is clear  XI:  Head turning and shoulder shrug are intact.  - Motor:  Strength is 5/5 x 4.   There is no pronator drift. .  - Reflexes:  2+ and symmetric at the knees.  Plantar responses flexor.  - Sensory:  Symmetric to light touch  - Coordination:  Finger-nose-finger is normal. Rapid alternating hand and foot  movements are intact. Dexterity appears normal      LABS:                          14.5   7.1   )-----------( 186      ( 18 Jun 2019 22:27 )             42.1     18 Jun 2019 22:27    141    |  104    |  17.0   ----------------------------<  90     4.2     |  23.0   |  1.01     Ca    9.0        18 Jun 2019 22:27    TPro  7.5    /  Alb  4.3    /  TBili  0.3    /  DBili  x      /  AST  22     /  ALT  19     /  AlkPhos  80     18 Jun 2019 22:27    LIVER FUNCTIONS - ( 18 Jun 2019 22:27 )  Alb: 4.3 g/dL / Pro: 7.5 g/dL / ALK PHOS: 80 U/L / ALT: 19 U/L / AST: 22 U/L / GGT: x           PT/INR - ( 18 Jun 2019 22:27 )   PT: 9.5 sec;   INR: 0.83 ratio         PTT - ( 18 Jun 2019 22:27 )  PTT:30.4 sec      RADIOLOGY & ADDITIONAL STUDIES:          IMPRESSION:    < from: CT Brain Stroke Protocol (06.18.19 @ 22:00) >  INTRACRANIAL FINDINGS: There is age-related atrophy and chronic   microvascular ischemic change. No evidence for acute territorial infarct,   mass lesion, mass effect, or recent hemorrhage. The ventricles are normal   in size. There is no abnormal extra axial collection.    < end of copied text >    Carotid duplex is neg    PLAN:  1. MRA brain and neck  2. Consider full cardiac eval if no large vessel disease to explain  3. Antiplatelet therapy as prescribed.  4.Cerebrovascular risk factor assessment and management  5.

## 2019-06-19 NOTE — ED ADULT NURSE REASSESSMENT NOTE - NS ED NURSE REASSESS COMMENT FT1
Ab contacted @ 1342.
Received report from MARCIA Baeza. Pt aox4, breathing equal and unlabored, Sinus torri on CM. Pt states "my symptoms I had originally subsided and I feel ok". Pt ambulated without problem.
Received report from MARCIA Lira. Pt moved to CDU for observation. Assumed care of pt at 1522 hours.
assumed care of pt @ 2000, report received from simona KENNEDY, charting as noted. Pt AOx4 in NAD, Vital Signs Stable. pt returned to CDU from MRI. no slurred speech or weakness. face symmetrical. strength and sensation equal bilaterally. HR is NSR on cardiac monitor, lung sounds are clear b/l, abd is soft and nontender with positive bowel sounds in all four quadrants, skin is warm, dry and appropriate for age and race. pt educated on plan of care and observation stay. Plan of care taught back to RN. Proficiency determined from successful pt teach back. Pt oriented to unit, staff, and room. Pt reeducated on call bell use. Bed locked in lowest position, call bell within reach. All questions and concerns addressed.     awaiting TTE and MRI return.
pt d/c in stable condition, no apparent distress noted at this time. pt A&Ox3. pt able to ambulate with steady gait. pt in no distress at d/c.
pt status unchanged, refer to flowsheet and chart, pt safety maintained, pt hemodynamically stable. Pt resting comfortably. Pt continues to be on OBS. Pt has no medical complaints at this time.
Assumed pt care at this time. Pt is A+Ox4, in no apparent distress. Equal strength in all extremities. Pt reports no numbness, tingling or loss of sensation. No slurred speech or facial droop present. Aware of plan of care, VSS. IV #20 L AC patent. SB on cardiac monitor. Safety measures in place. Will continue to closely monitor.

## 2019-06-19 NOTE — ED ADULT NURSE REASSESSMENT NOTE - COMFORT CARE
meal provided
po fluids offered/ambulated to bathroom/plan of care explained/meal provided/wait time explained

## 2019-06-20 PROBLEM — C91.90 LYMPHOID LEUKEMIA, UNSPECIFIED NOT HAVING ACHIEVED REMISSION: Chronic | Status: ACTIVE | Noted: 2019-06-18

## 2019-06-20 PROBLEM — I10 ESSENTIAL (PRIMARY) HYPERTENSION: Chronic | Status: ACTIVE | Noted: 2019-06-18

## 2019-06-25 ENCOUNTER — LABORATORY RESULT (OUTPATIENT)
Age: 59
End: 2019-06-25

## 2019-06-25 ENCOUNTER — APPOINTMENT (OUTPATIENT)
Dept: INTERNAL MEDICINE | Facility: CLINIC | Age: 59
End: 2019-06-25
Payer: COMMERCIAL

## 2019-06-25 VITALS
BODY MASS INDEX: 30.77 KG/M2 | HEART RATE: 70 BPM | HEIGHT: 68 IN | DIASTOLIC BLOOD PRESSURE: 86 MMHG | WEIGHT: 203 LBS | SYSTOLIC BLOOD PRESSURE: 137 MMHG

## 2019-06-25 DIAGNOSIS — R79.89 OTHER SPECIFIED ABNORMAL FINDINGS OF BLOOD CHEMISTRY: ICD-10-CM

## 2019-06-25 PROCEDURE — 36415 COLL VENOUS BLD VENIPUNCTURE: CPT

## 2019-06-25 PROCEDURE — 99214 OFFICE O/P EST MOD 30 MIN: CPT | Mod: 25

## 2019-06-25 NOTE — PHYSICAL EXAM

## 2019-06-26 ENCOUNTER — APPOINTMENT (OUTPATIENT)
Dept: HEMATOLOGY ONCOLOGY | Facility: CLINIC | Age: 59
End: 2019-06-26
Payer: COMMERCIAL

## 2019-06-26 VITALS
BODY MASS INDEX: 31.17 KG/M2 | WEIGHT: 205 LBS | OXYGEN SATURATION: 98 % | SYSTOLIC BLOOD PRESSURE: 138 MMHG | DIASTOLIC BLOOD PRESSURE: 90 MMHG | HEART RATE: 68 BPM

## 2019-06-26 PROBLEM — R79.89 ELEVATED TSH: Status: ACTIVE | Noted: 2019-06-25

## 2019-06-26 LAB
T4 SERPL-MCNC: 5.9 UG/DL
TSH SERPL-ACNC: 3.63 UIU/ML

## 2019-06-26 PROCEDURE — 85025 COMPLETE CBC W/AUTO DIFF WBC: CPT

## 2019-06-26 PROCEDURE — 99215 OFFICE O/P EST HI 40 MIN: CPT

## 2019-06-26 NOTE — REVIEW OF SYSTEMS
[Fever] : no fever [Chills] : no chills [Night Sweats] : no night sweats [Discharge] : no discharge [Pain] : no pain [Vision Problems] : no vision problems [Chest Pain] : no chest pain [Palpitations] : no palpitations [Orthopena] : no orthopnea [Paroysmal Nocturnal Dyspnea] : no paroysmal nocturnal dyspnea [Shortness Of Breath] : no shortness of breath [Wheezing] : no wheezing [Abdominal Pain] : no abdominal pain [Nausea] : no nausea [Vomiting] : no vomiting [Heartburn] : no heartburn [Headache] : no headache [Memory Loss] : no memory loss [Insomnia] : no insomnia [Anxiety] : no anxiety

## 2019-06-26 NOTE — HISTORY OF PRESENT ILLNESS
[de-identified] : HPI:\par Patient went to [place] ED because of a lightheaded feeling on June 19.  Had neuro work up plus an ECHO.  All studies were normal.  He also had some numbness of left arm.  The total duration was less than 15 minutes. Patient felt lydia he might have been mildly dehydrated.  No palpitations.

## 2019-06-26 NOTE — ASSESSMENT
[FreeTextEntry1] : Lightheadedness:\par No obvious cause.\par Data from [facility] reviewed.  No abnormalities were found.\par \par Elevated TSH:\par Repeat today.

## 2019-06-27 LAB
ALBUMIN SERPL ELPH-MCNC: 4.4 G/DL
ALP BLD-CCNC: 101 U/L
ALT SERPL-CCNC: 31 U/L
ANION GAP SERPL CALC-SCNC: 16 MMOL/L
AST SERPL-CCNC: 35 U/L
BILIRUB SERPL-MCNC: 0.2 MG/DL
BUN SERPL-MCNC: 18 MG/DL
CALCIUM SERPL-MCNC: 9.2 MG/DL
CHLORIDE SERPL-SCNC: 108 MMOL/L
CO2 SERPL-SCNC: 18 MMOL/L
CREAT SERPL-MCNC: 1.03 MG/DL
GLUCOSE SERPL-MCNC: 110 MG/DL
LDH SERPL-CCNC: 190 U/L
POTASSIUM SERPL-SCNC: 4.8 MMOL/L
PROT SERPL-MCNC: 7 G/DL
SODIUM SERPL-SCNC: 142 MMOL/L
URATE SERPL-MCNC: 5.7 MG/DL

## 2019-06-27 NOTE — ASSESSMENT
[FreeTextEntry1] : Mr. Don is a 59 year old male with CLL (diagnosed 2014) on [id number] study who presents for follow-up hospitalization due to dizziness.\par  \par Plan:\par 1)CLL:\par -Counts stable, continue study drugs as per protocol.\par -Questionable dizziness vs ?TIA:  It is okay for patient to be on ASA 81 mg and may resume study drug\par \par 2)hypertension: continue losartan 50mg daily as prescribed\par \par RTO per protocl on July 16, 2019 -/+3 days, sooner prn

## 2019-06-27 NOTE — RESULTS/DATA
[FreeTextEntry1] : CBC done 19\par WBC: 6.9\par A.16\par ANC: 4.93\par Hgb: 14.6\par Hct: 42.9\par Plt: 186.0

## 2019-06-27 NOTE — HISTORY OF PRESENT ILLNESS
[de-identified] : Mr. Don is a 59 year old male with CLL (diagnosed 2014) on [id number]-[id number] study who presents for follow-up hospitalization due to dizziness.\par \par On 6/18/19 evening around 9:30 pm, patient reports dizziness and questionable right arm numbness. He wasn't sure of his symptoms, but he erred on the side of caution and drove himself to [facility]. A series of CT, MRI, MRA, and carotid Doppler was done-all normal. Patient states he did feel dehydrated that day and dizziness resolved upon reaching hospital. He has been off study drug on 6/19/19. Last dose of study dose was on 6/18/19. In hospital he was given 1 course of aspirin 81 mg-he has not taken another dose since.He reports feeling well overall today. No fevers or drenching night sweats. Good appetite. Weight overall stable. Normal bowel movements. No chest pain or shortness of breath. No palpitations. Energy stable. No infections currently. No changes in medications. \par \par  [de-identified] : CLL Markers:\par CD38+, ZAP70+\par unmutated IGHV\par TP53 mutation [FreeTextEntry1] : [id number]

## 2019-06-27 NOTE — REVIEW OF SYSTEMS
[Negative] : Heme/Lymph [FreeTextEntry4] : dry nose, grade 1 sores in nose, using metronidazole 0.75% cream as needed

## 2019-06-27 NOTE — PHYSICAL EXAM
[Fully active, able to carry on all pre-disease performance without restriction] : Status 0 - Fully active, able to carry on all pre-disease performance without restriction [Normal] : affect appropriate [de-identified] : EOMI, anicteric sclera, conjunctiva normal [de-identified] : DONALDO [de-identified] : no c/c/e [de-identified] : soft; NT/ND; NABS; no hepatosplenomegaly [de-identified] : no bilateral cervical, no bilateral supraclavicular, no bilateral axillary, and no bilateral inguinal lymphadenopathy palpated

## 2019-07-11 ENCOUNTER — RX RENEWAL (OUTPATIENT)
Age: 59
End: 2019-07-11

## 2019-07-17 ENCOUNTER — APPOINTMENT (OUTPATIENT)
Dept: HEMATOLOGY ONCOLOGY | Facility: CLINIC | Age: 59
End: 2019-07-17
Payer: COMMERCIAL

## 2019-07-17 VITALS — WEIGHT: 208 LBS | BODY MASS INDEX: 31.63 KG/M2

## 2019-07-17 VITALS — TEMPERATURE: 98.3 F | OXYGEN SATURATION: 99 % | HEART RATE: 56 BPM

## 2019-07-17 VITALS — SYSTOLIC BLOOD PRESSURE: 120 MMHG | DIASTOLIC BLOOD PRESSURE: 80 MMHG

## 2019-07-17 PROCEDURE — 99215 OFFICE O/P EST HI 40 MIN: CPT

## 2019-07-17 PROCEDURE — 85025 COMPLETE CBC W/AUTO DIFF WBC: CPT

## 2019-07-17 RX ORDER — ASPIRIN 81 MG
81 TABLET, DELAYED RELEASE (ENTERIC COATED) ORAL
Refills: 0 | Status: DISCONTINUED | COMMUNITY
End: 2019-07-17

## 2019-07-17 NOTE — HISTORY OF PRESENT ILLNESS
[de-identified] : Mr. Don is a 59 year old male with CLL (diagnosed 2014) on [id number] study who presents for follow-up per study protocol. this is visit cycle 26 day 1.\par \par He reports feeling well overall and has no complaints. He has not experienced another episode of dizziness and numbness to extremities. No fevers or drenching night sweats. Good appetite. Weight overall stable. Normal bowel movements. No chest pain or shortness of breath. No palpitations. Energy stable. No infections currently. No changes in medications. He is continuing to take valacyclovir for cold sores in mouth which was prescribed to him on last visit. He reports the cold sores have improved since taking antiviral. \par \par  [de-identified] : CLL Markers:\par CD38+, ZAP70+\par unmutated IGHV\par TP53 mutation [FreeTextEntry1] : [id number]

## 2019-07-17 NOTE — PHYSICAL EXAM
[Fully active, able to carry on all pre-disease performance without restriction] : Status 0 - Fully active, able to carry on all pre-disease performance without restriction [Normal] : affect appropriate [de-identified] : EOMI, anicteric sclera, conjunctiva normal [de-identified] : DONALDO [de-identified] : no c/c/e [de-identified] : soft; NT/ND; NABS; no hepatosplenomegaly [de-identified] : no bilateral cervical, no bilateral supraclavicular, no bilateral axillary, and no bilateral inguinal lymphadenopathy palpated

## 2019-07-17 NOTE — ASSESSMENT
[FreeTextEntry1] : Mr. Don is a 59 year old male with CLL (diagnosed 2014) on [id number] study who presents for follow-up per study protocol. this is visit cycle 26 day 1.\par  \par Plan:\par 1)CLL:\par -Counts stable, continue study drugs as per protocol. Patient was given cycle 26 day 1 os study drug in clinic at 10:50 am. He is due for CT abd/pelvis, neck, and chest with/without contrast on 10/7/19. \par \par 2)hypertension: continue losartan 50mg daily as prescribed\par \par RTO per protocol on 10/9/19 for bone marrow bx and patient to have CT abd/pelvis, chest, and neck on 10/7/19, sooner prn. This will be cycle 29 day 1.

## 2019-07-17 NOTE — RESULTS/DATA
[FreeTextEntry1] : CBC done 19:\par WBC: 6.8\par A.91\par ANC: 4.18\par Hgb: 14.4\par Hct: 42.7\par Plt: 158.0

## 2019-07-18 LAB
ALBUMIN SERPL ELPH-MCNC: 4.1 G/DL
ALP BLD-CCNC: 68 U/L
ALT SERPL-CCNC: 19 U/L
ANION GAP SERPL CALC-SCNC: 11 MMOL/L
AST SERPL-CCNC: 15 U/L
BILIRUB SERPL-MCNC: 0.3 MG/DL
BUN SERPL-MCNC: 17 MG/DL
CALCIUM SERPL-MCNC: 8.9 MG/DL
CHLORIDE SERPL-SCNC: 104 MMOL/L
CO2 SERPL-SCNC: 22 MMOL/L
CREAT SERPL-MCNC: 1.07 MG/DL
GLUCOSE SERPL-MCNC: 98 MG/DL
LDH SERPL-CCNC: 159 U/L
MAGNESIUM SERPL-MCNC: 2.2 MG/DL
PHOSPHATE SERPL-MCNC: 2.7 MG/DL
POTASSIUM SERPL-SCNC: 4.7 MMOL/L
PROT SERPL-MCNC: 6.8 G/DL
SODIUM SERPL-SCNC: 137 MMOL/L
URATE SERPL-MCNC: 5.9 MG/DL

## 2019-08-14 ENCOUNTER — OTHER (OUTPATIENT)
Age: 59
End: 2019-08-14

## 2019-08-22 ENCOUNTER — OTHER (OUTPATIENT)
Age: 59
End: 2019-08-22

## 2019-09-09 ENCOUNTER — APPOINTMENT (OUTPATIENT)
Dept: HEMATOLOGY ONCOLOGY | Facility: CLINIC | Age: 59
End: 2019-09-09
Payer: COMMERCIAL

## 2019-09-09 PROCEDURE — 99499A: CUSTOM | Mod: NC

## 2019-09-10 LAB
ALBUMIN SERPL ELPH-MCNC: 4.4 G/DL
ALP BLD-CCNC: 66 U/L
ALT SERPL-CCNC: 18 U/L
ANION GAP SERPL CALC-SCNC: 15 MMOL/L
AST SERPL-CCNC: 23 U/L
BILIRUB SERPL-MCNC: 0.4 MG/DL
BUN SERPL-MCNC: 16 MG/DL
CALCIUM SERPL-MCNC: 9.4 MG/DL
CHLORIDE SERPL-SCNC: 101 MMOL/L
CO2 SERPL-SCNC: 21 MMOL/L
CREAT SERPL-MCNC: 1.22 MG/DL
GLUCOSE SERPL-MCNC: 105 MG/DL
LDH SERPL-CCNC: 184 U/L
MAGNESIUM SERPL-MCNC: 2.1 MG/DL
PHOSPHATE SERPL-MCNC: 3.7 MG/DL
POTASSIUM SERPL-SCNC: 4.3 MMOL/L
PROT SERPL-MCNC: 7.2 G/DL
SODIUM SERPL-SCNC: 137 MMOL/L
URATE SERPL-MCNC: 5.7 MG/DL

## 2019-09-16 ENCOUNTER — LABORATORY RESULT (OUTPATIENT)
Age: 59
End: 2019-09-16

## 2019-09-16 ENCOUNTER — APPOINTMENT (OUTPATIENT)
Dept: INTERNAL MEDICINE | Facility: CLINIC | Age: 59
End: 2019-09-16
Payer: COMMERCIAL

## 2019-09-16 ENCOUNTER — NON-APPOINTMENT (OUTPATIENT)
Age: 59
End: 2019-09-16

## 2019-09-16 VITALS
SYSTOLIC BLOOD PRESSURE: 143 MMHG | HEART RATE: 62 BPM | DIASTOLIC BLOOD PRESSURE: 91 MMHG | HEIGHT: 68 IN | WEIGHT: 202 LBS | BODY MASS INDEX: 30.62 KG/M2

## 2019-09-16 PROCEDURE — 99396 PREV VISIT EST AGE 40-64: CPT | Mod: 25

## 2019-09-16 PROCEDURE — 36415 COLL VENOUS BLD VENIPUNCTURE: CPT

## 2019-09-16 PROCEDURE — 93000 ELECTROCARDIOGRAM COMPLETE: CPT

## 2019-09-16 PROCEDURE — G0008: CPT

## 2019-09-16 PROCEDURE — 90686 IIV4 VACC NO PRSV 0.5 ML IM: CPT

## 2019-09-16 RX ORDER — LOSARTAN POTASSIUM 100 MG/1
TABLET, FILM COATED ORAL
Refills: 0 | Status: DISCONTINUED | COMMUNITY
End: 2019-09-16

## 2019-09-16 RX ORDER — LOSARTAN POTASSIUM 100 MG/1
100 TABLET, FILM COATED ORAL DAILY
Qty: 90 | Refills: 0 | Status: DISCONTINUED | COMMUNITY
Start: 2018-03-30 | End: 2019-09-16

## 2019-09-16 RX ORDER — IBRUTINIB 420 MG/1
TABLET, FILM COATED ORAL
Refills: 0 | Status: ACTIVE | COMMUNITY

## 2019-09-16 NOTE — PHYSICAL EXAM
[Well Nourished] : well nourished [No Acute Distress] : no acute distress [Well Developed] : well developed [Well-Appearing] : well-appearing [PERRL] : pupils equal round and reactive to light [Normal Sclera/Conjunctiva] : normal sclera/conjunctiva [EOMI] : extraocular movements intact [Normal Outer Ear/Nose] : the outer ears and nose were normal in appearance [Normal Oropharynx] : the oropharynx was normal [No JVD] : no jugular venous distention [Supple] : supple [No Lymphadenopathy] : no lymphadenopathy [Thyroid Normal, No Nodules] : the thyroid was normal and there were no nodules present [No Respiratory Distress] : no respiratory distress  [Clear to Auscultation] : lungs were clear to auscultation bilaterally [No Accessory Muscle Use] : no accessory muscle use [Normal Rate] : normal rate  [Normal S1, S2] : normal S1 and S2 [Regular Rhythm] : with a regular rhythm [No Murmur] : no murmur heard [No Carotid Bruits] : no carotid bruits [No Abdominal Bruit] : a ~M bruit was not heard ~T in the abdomen [No Varicosities] : no varicosities [Pedal Pulses Present] : the pedal pulses are present [No Palpable Aorta] : no palpable aorta [No Edema] : there was no peripheral edema [No Extremity Clubbing/Cyanosis] : no extremity clubbing/cyanosis [Soft] : abdomen soft [Non-distended] : non-distended [Non Tender] : non-tender [No Masses] : no abdominal mass palpated [No HSM] : no HSM [Normal Bowel Sounds] : normal bowel sounds [Normal Posterior Cervical Nodes] : no posterior cervical lymphadenopathy [Normal Anterior Cervical Nodes] : no anterior cervical lymphadenopathy [No CVA Tenderness] : no CVA  tenderness [No Spinal Tenderness] : no spinal tenderness [No Joint Swelling] : no joint swelling [Grossly Normal Strength/Tone] : grossly normal strength/tone [No Rash] : no rash [Coordination Grossly Intact] : coordination grossly intact [Normal Gait] : normal gait [No Focal Deficits] : no focal deficits [Deep Tendon Reflexes (DTR)] : deep tendon reflexes were 2+ and symmetric [Normal Affect] : the affect was normal [Normal Insight/Judgement] : insight and judgment were intact

## 2019-09-17 LAB
ALBUMIN SERPL ELPH-MCNC: 4.5 G/DL
ALP BLD-CCNC: 69 U/L
ALT SERPL-CCNC: 19 U/L
ANION GAP SERPL CALC-SCNC: 13 MMOL/L
APPEARANCE: CLEAR
AST SERPL-CCNC: 21 U/L
BACTERIA: NEGATIVE
BASOPHILS # BLD AUTO: 0.05 K/UL
BASOPHILS NFR BLD AUTO: 0.7 %
BILIRUB SERPL-MCNC: 0.3 MG/DL
BILIRUBIN URINE: NEGATIVE
BLOOD URINE: NEGATIVE
BUN SERPL-MCNC: 15 MG/DL
CALCIUM SERPL-MCNC: 9.4 MG/DL
CHLORIDE SERPL-SCNC: 105 MMOL/L
CHOLEST SERPL-MCNC: 190 MG/DL
CHOLEST/HDLC SERPL: 4 RATIO
CO2 SERPL-SCNC: 21 MMOL/L
COLOR: NORMAL
CREAT SERPL-MCNC: 1.15 MG/DL
EOSINOPHIL # BLD AUTO: 0.14 K/UL
EOSINOPHIL NFR BLD AUTO: 1.9 %
ESTIMATED AVERAGE GLUCOSE: 120 MG/DL
GLUCOSE QUALITATIVE U: NEGATIVE
GLUCOSE SERPL-MCNC: 81 MG/DL
HBA1C MFR BLD HPLC: 5.8 %
HCT VFR BLD CALC: 44.8 %
HDLC SERPL-MCNC: 48 MG/DL
HGB BLD-MCNC: 14.9 G/DL
HYALINE CASTS: 0 /LPF
IMM GRANULOCYTES NFR BLD AUTO: 0.4 %
KETONES URINE: NEGATIVE
LDLC SERPL CALC-MCNC: 107 MG/DL
LEUKOCYTE ESTERASE URINE: NEGATIVE
LYMPHOCYTES # BLD AUTO: 1.59 K/UL
LYMPHOCYTES NFR BLD AUTO: 21 %
MAN DIFF?: NORMAL
MCHC RBC-ENTMCNC: 30.8 PG
MCHC RBC-ENTMCNC: 33.3 GM/DL
MCV RBC AUTO: 92.6 FL
MICROSCOPIC-UA: NORMAL
MONOCYTES # BLD AUTO: 0.77 K/UL
MONOCYTES NFR BLD AUTO: 10.2 %
NEUTROPHILS # BLD AUTO: 4.98 K/UL
NEUTROPHILS NFR BLD AUTO: 65.8 %
NITRITE URINE: NEGATIVE
PH URINE: 6
PLATELET # BLD AUTO: 163 K/UL
POTASSIUM SERPL-SCNC: 4.2 MMOL/L
PROT SERPL-MCNC: 7.3 G/DL
PROTEIN URINE: NEGATIVE
PSA SERPL-MCNC: 5.47 NG/ML
RBC # BLD: 4.84 M/UL
RBC # FLD: 15.4 %
RED BLOOD CELLS URINE: 1 /HPF
SAVE SPECIMEN: NORMAL
SODIUM SERPL-SCNC: 139 MMOL/L
SPECIFIC GRAVITY URINE: 1.01
SQUAMOUS EPITHELIAL CELLS: 0 /HPF
T4 SERPL-MCNC: 6.3 UG/DL
TRIGL SERPL-MCNC: 176 MG/DL
TSH SERPL-ACNC: 7.75 UIU/ML
URATE SERPL-MCNC: 5.2 MG/DL
UROBILINOGEN URINE: NORMAL
WBC # FLD AUTO: 7.56 K/UL
WHITE BLOOD CELLS URINE: 0 /HPF

## 2019-09-17 NOTE — HISTORY OF PRESENT ILLNESS
[de-identified] : This is annual Preventative examination for this 59 year year old White male.  The patient has been generally feeling well with no new complaints (except:). A complete evaluation of their current medication was reviewed with them including OTC medication .\par \par Chronic medical problems for which they are being followed by a physician are:\par CLL\par    \par Colonoscopist:    Last done-\par \par Exercises regularly:\par \par A complete Review of Systems is below but it is noteworthy to mention that this patient denies Chest Pain, Dyspnea on Exertion, Palpitations, urinary problems, rectal bleeding or Gastrointestinal problems at this time.\par \par \par

## 2019-09-17 NOTE — ASSESSMENT
[FreeTextEntry1] : This is a 59 year -old  M who was examined today for an annual preventative physical.  A complete history and physical examination were performed.  The patient seems to follow a healthy lifestyle in that he  follows a good diet and exercises regularly.  \par \par Physical examination was entirely within normal limits , including a normal blood pressure and pulse.  \par \par \par The following recommendations were made:\par Exercise regularly.\par Maintain a healthy diet.\par \par Stressed the need to follow through with the ColoGard. It is important for colon cancer screening, if he will continue to refuse doing a colonoscopy.\par \par ECG: SB, Inverted T waves I, AVL and V5 and 6.\par ECG shows new ECG changes of flipped T waves laterally.  He will see Dr. Tam Washington for follow up.\par \par \par

## 2019-09-17 NOTE — HEALTH RISK ASSESSMENT
[Good] : ~his/her~ current health as good [Yes] : Yes [0] : 2) Feeling down, depressed, or hopeless: Not at all (0) [With Patient/Caregiver] : With Patient/Caregiver [Name: ___] : Health Care Proxy's Name: [unfilled]  [Relationship: ___] : Relationship: [unfilled] [] : No [de-identified] : Socially [[id number]] : 0 [AdvancecareDate] : 09/2019

## 2019-09-19 ENCOUNTER — OTHER (OUTPATIENT)
Age: 59
End: 2019-09-19

## 2019-10-02 ENCOUNTER — OTHER (OUTPATIENT)
Age: 59
End: 2019-10-02

## 2019-10-06 ENCOUNTER — FORM ENCOUNTER (OUTPATIENT)
Age: 59
End: 2019-10-06

## 2019-10-07 ENCOUNTER — APPOINTMENT (OUTPATIENT)
Dept: CT IMAGING | Facility: IMAGING CENTER | Age: 59
End: 2019-10-07
Payer: COMMERCIAL

## 2019-10-07 ENCOUNTER — OUTPATIENT (OUTPATIENT)
Dept: OUTPATIENT SERVICES | Facility: HOSPITAL | Age: 59
LOS: 1 days | End: 2019-10-07
Payer: SUBSIDIZED

## 2019-10-07 DIAGNOSIS — Z98.890 OTHER SPECIFIED POSTPROCEDURAL STATES: Chronic | ICD-10-CM

## 2019-10-07 DIAGNOSIS — C91.10 CHRONIC LYMPHOCYTIC LEUKEMIA OF B-CELL TYPE NOT HAVING ACHIEVED REMISSION: ICD-10-CM

## 2019-10-07 PROCEDURE — 71260 CT THORAX DX C+: CPT

## 2019-10-07 PROCEDURE — 70491 CT SOFT TISSUE NECK W/DYE: CPT | Mod: 26

## 2019-10-07 PROCEDURE — 70491 CT SOFT TISSUE NECK W/DYE: CPT

## 2019-10-07 PROCEDURE — 74177 CT ABD & PELVIS W/CONTRAST: CPT | Mod: 26

## 2019-10-07 PROCEDURE — 74177 CT ABD & PELVIS W/CONTRAST: CPT

## 2019-10-07 PROCEDURE — 71260 CT THORAX DX C+: CPT | Mod: 26

## 2019-10-09 ENCOUNTER — RESULT REVIEW (OUTPATIENT)
Age: 59
End: 2019-10-09

## 2019-10-09 ENCOUNTER — OUTPATIENT (OUTPATIENT)
Dept: OUTPATIENT SERVICES | Facility: HOSPITAL | Age: 59
LOS: 1 days | End: 2019-10-09
Payer: COMMERCIAL

## 2019-10-09 ENCOUNTER — APPOINTMENT (OUTPATIENT)
Dept: HEMATOLOGY ONCOLOGY | Facility: CLINIC | Age: 59
End: 2019-10-09
Payer: COMMERCIAL

## 2019-10-09 ENCOUNTER — LABORATORY RESULT (OUTPATIENT)
Age: 59
End: 2019-10-09

## 2019-10-09 VITALS
TEMPERATURE: 97.6 F | RESPIRATION RATE: 61 BRPM | SYSTOLIC BLOOD PRESSURE: 140 MMHG | OXYGEN SATURATION: 99 % | DIASTOLIC BLOOD PRESSURE: 84 MMHG | HEART RATE: 61 BPM | BODY MASS INDEX: 31.02 KG/M2 | WEIGHT: 204 LBS

## 2019-10-09 DIAGNOSIS — Z98.890 OTHER SPECIFIED POSTPROCEDURAL STATES: Chronic | ICD-10-CM

## 2019-10-09 DIAGNOSIS — C91.10 CHRONIC LYMPHOCYTIC LEUKEMIA OF B-CELL TYPE NOT HAVING ACHIEVED REMISSION: ICD-10-CM

## 2019-10-09 PROCEDURE — 88275 CYTOGENETICS 100-300: CPT

## 2019-10-09 PROCEDURE — 88264 CHROMOSOME ANALYSIS 20-25: CPT

## 2019-10-09 PROCEDURE — 88285 CHROMOSOME COUNT ADDITIONAL: CPT

## 2019-10-09 PROCEDURE — 88271 CYTOGENETICS DNA PROBE: CPT

## 2019-10-09 PROCEDURE — 99215 OFFICE O/P EST HI 40 MIN: CPT

## 2019-10-09 PROCEDURE — 88280 CHROMOSOME KARYOTYPE STUDY: CPT

## 2019-10-09 PROCEDURE — 88291 CYTO/MOLECULAR REPORT: CPT

## 2019-10-09 PROCEDURE — 88237 TISSUE CULTURE BONE MARROW: CPT

## 2019-10-09 PROCEDURE — 85025 COMPLETE CBC W/AUTO DIFF WBC: CPT

## 2019-10-10 LAB
ALBUMIN SERPL ELPH-MCNC: 4.4 G/DL
ALP BLD-CCNC: 76 U/L
ALT SERPL-CCNC: 23 U/L
ANION GAP SERPL CALC-SCNC: 14 MMOL/L
AST SERPL-CCNC: 22 U/L
BILIRUB SERPL-MCNC: 0.3 MG/DL
BUN SERPL-MCNC: 16 MG/DL
CALCIUM SERPL-MCNC: 9.1 MG/DL
CHLORIDE SERPL-SCNC: 102 MMOL/L
CO2 SERPL-SCNC: 22 MMOL/L
CREAT SERPL-MCNC: 1.21 MG/DL
GLUCOSE SERPL-MCNC: 105 MG/DL
LDH SERPL-CCNC: 163 U/L
MAGNESIUM SERPL-MCNC: 2.2 MG/DL
PHOSPHATE SERPL-MCNC: 3.6 MG/DL
POTASSIUM SERPL-SCNC: 4.4 MMOL/L
PROT SERPL-MCNC: 7.2 G/DL
SODIUM SERPL-SCNC: 138 MMOL/L
URATE SERPL-MCNC: 5.2 MG/DL

## 2019-10-10 NOTE — PHYSICAL EXAM
[Fully active, able to carry on all pre-disease performance without restriction] : Status 0 - Fully active, able to carry on all pre-disease performance without restriction [Normal] : grossly intact [de-identified] : EOMI, anicteric sclera, conjunctiva normal [de-identified] : no c/c/e [de-identified] : soft; NT/ND; NABS; no hepatosplenomegaly [de-identified] : DONALDO [de-identified] : no bilateral cervical, no bilateral supraclavicular, no bilateral axillary, and no bilateral inguinal lymphadenopathy palpated

## 2019-10-10 NOTE — ASSESSMENT
[FreeTextEntry1] : Mr. Don is a 59 year old male with CLL (diagnosed 2014) on [id number] study who presents for follow-up per study protocol. this is visit cycle 26 day 1.\par  \par Plan:\par 1)CLL:\par -Counts stable, continue study drugs as per protocol. Patient was given cycle 26 day 1 os study drug in clinic at 10:50 am. He is due for CT abd/pelvis, neck, and chest with/without contrast on 10/7/19. \par \par 2)hypertension: continue losartan 50mg daily as prescribed\par \par RTO per protocol on 10/9/19 for bone marrow bx and patient to have CT abd/pelvis, chest, and neck on 10/7/19, sooner prn. This will be cycle 29 day 1.

## 2019-10-10 NOTE — PROCEDURE
[Bone Marrow Aspiration] : bone marrow aspiration  [Bone Marrow Biopsy] : bone marrow biopsy [Patient] : the patient [Verbal Consent Obtained] : verbal consent was obtained prior to the procedure [Patient identification verified] : patient identification verified [Laterality verified and correct site marked] : laterality verified and correct site marked [Procedure verified and consent obtained] : procedure verified and consent obtained [Correct positioning] : correct positioning [Left] : site: left [Correct implant and/ or special equipment obtained] : correct impact and/ or special equipment obtained [Superior iliac spine was identified] : the superior iliac spine was identified. [Prone] : prone [Lidocaine was injected and into the periosteum overlying the site.] : Lidocaine was injected and into the periosteum overlying the site. [The left posterior iliac crest was prepped with betadine and draped, using sterile technique.] : The left posterior iliac crest was prepped with betadine and draped, using sterile technique. [FISH] : FISH [Cytogenetics] : cytogenetics [Aspirate] : aspirate [PCR] : PCR [Biopsy] : biopsy [Flow Cytometry] : flow cytometry [] : The patient was instructed to remove the bandage the following AM. The patient may bathe. Acetaminophen may be taken for discomfort, as per package directions.If there are any other problems, the patient was instructed to call the office. The patient verbalized understanding, and is aware of the office contact numbers. [FreeTextEntry1] : per study protocol

## 2019-10-10 NOTE — PROCEDURE
[Bone Marrow Biopsy] : bone marrow biopsy [Bone Marrow Aspiration] : bone marrow aspiration  [Patient] : the patient [Patient identification verified] : patient identification verified [Verbal Consent Obtained] : verbal consent was obtained prior to the procedure [Procedure verified and consent obtained] : procedure verified and consent obtained [Laterality verified and correct site marked] : laterality verified and correct site marked [Correct positioning] : correct positioning [Left] : site: left [Correct implant and/ or special equipment obtained] : correct impact and/ or special equipment obtained [Superior iliac spine was identified] : the superior iliac spine was identified. [Prone] : prone [The left posterior iliac crest was prepped with betadine and draped, using sterile technique.] : The left posterior iliac crest was prepped with betadine and draped, using sterile technique. [Lidocaine was injected and into the periosteum overlying the site.] : Lidocaine was injected and into the periosteum overlying the site. [Aspirate] : aspirate [FISH] : FISH [Cytogenetics] : cytogenetics [PCR] : PCR [Biopsy] : biopsy [Flow Cytometry] : flow cytometry [] : The patient was instructed to remove the bandage the following AM. The patient may bathe. Acetaminophen may be taken for discomfort, as per package directions.If there are any other problems, the patient was instructed to call the office. The patient verbalized understanding, and is aware of the office contact numbers. [FreeTextEntry1] : per study protocol

## 2019-10-10 NOTE — HISTORY OF PRESENT ILLNESS
[de-identified] : Mr. Don is a 59 year old male with CLL (diagnosed 2014) on [id number] study who presents for follow-up per study protocol. this is visit cycle 26 day 1.\par \par He reports feeling well overall and has no complaints. He has not experienced another episode of dizziness and numbness to extremities. No fevers or drenching night sweats. Good appetite. Weight overall stable. Normal bowel movements. No chest pain or shortness of breath. No palpitations. Energy stable. No infections currently. No changes in medications. He is continuing to take valacyclovir for cold sores in mouth which was prescribed to him on last visit. He reports the cold sores have improved since taking antiviral. \par \par  [FreeTextEntry1] : [id number] [de-identified] : CLL Markers:\par CD38+, ZAP70+\par unmutated IGHV\par TP53 mutation

## 2019-10-10 NOTE — PHYSICAL EXAM
[Fully active, able to carry on all pre-disease performance without restriction] : Status 0 - Fully active, able to carry on all pre-disease performance without restriction [Normal] : normal appearance, no rash, nodules, vesicles, ulcers, erythema [de-identified] : EOMI, anicteric sclera, conjunctiva normal [de-identified] : no c/c/e [de-identified] : soft; NT/ND; NABS; no hepatosplenomegaly [de-identified] : DONALDO [de-identified] : no bilateral cervical, no bilateral supraclavicular, no bilateral axillary, and no bilateral inguinal lymphadenopathy palpated

## 2019-10-10 NOTE — HISTORY OF PRESENT ILLNESS
[de-identified] : Mr. Don is a 59 year old male with CLL (diagnosed 2014) on [id number] study who presents for follow-up per study protocol. this is visit cycle 26 day 1.\par \par He reports feeling well overall and has no complaints. He has not experienced another episode of dizziness and numbness to extremities. No fevers or drenching night sweats. Good appetite. Weight overall stable. Normal bowel movements. No chest pain or shortness of breath. No palpitations. Energy stable. No infections currently. No changes in medications. He is continuing to take valacyclovir for cold sores in mouth which was prescribed to him on last visit. He reports the cold sores have improved since taking antiviral. \par \par  [FreeTextEntry1] : [id number] [de-identified] : CLL Markers:\par CD38+, ZAP70+\par unmutated IGHV\par TP53 mutation

## 2019-10-17 ENCOUNTER — NON-APPOINTMENT (OUTPATIENT)
Age: 59
End: 2019-10-17

## 2019-10-17 ENCOUNTER — APPOINTMENT (OUTPATIENT)
Dept: CARDIOLOGY | Facility: CLINIC | Age: 59
End: 2019-10-17
Payer: COMMERCIAL

## 2019-10-17 VITALS
HEIGHT: 68 IN | HEART RATE: 59 BPM | SYSTOLIC BLOOD PRESSURE: 146 MMHG | DIASTOLIC BLOOD PRESSURE: 98 MMHG | OXYGEN SATURATION: 97 % | WEIGHT: 207 LBS | BODY MASS INDEX: 31.37 KG/M2

## 2019-10-17 VITALS — SYSTOLIC BLOOD PRESSURE: 138 MMHG | DIASTOLIC BLOOD PRESSURE: 88 MMHG

## 2019-10-17 PROCEDURE — 93000 ELECTROCARDIOGRAM COMPLETE: CPT

## 2019-10-17 PROCEDURE — 99244 OFF/OP CNSLTJ NEW/EST MOD 40: CPT

## 2019-10-17 NOTE — REVIEW OF SYSTEMS
[see HPI] : see HPI [Dyspnea on exertion] : not dyspnea during exertion [Chest Pain] : no chest pain [Lower Ext Edema] : no extremity edema [Palpitations] : no palpitations [Negative] : Heme/Lymph

## 2019-10-17 NOTE — PHYSICAL EXAM
[General Appearance - Well Developed] : well developed [General Appearance - Well Nourished] : well nourished [General Appearance - In No Acute Distress] : no acute distress [Normal Conjunctiva] : the conjunctiva exhibited no abnormalities [No Oral Pallor] : no oral pallor [Normal Jugular Venous V Waves Present] : normal jugular venous V waves present [Heart Sounds] : normal S1 and S2 [Arterial Pulses Normal] : the arterial pulses were normal [Edema] : no peripheral edema present [Regular] : the rhythm was regular [Respiration, Rhythm And Depth] : normal respiratory rhythm and effort [Auscultation Breath Sounds / Voice Sounds] : lungs were clear to auscultation bilaterally [Bowel Sounds] : normal bowel sounds [Abdomen Soft] : soft [Abnormal Walk] : normal gait [Cyanosis, Localized] : no localized cyanosis [Skin Turgor] : normal skin turgor [Oriented To Time, Place, And Person] : oriented to person, place, and time [Impaired Insight] : insight and judgment were intact [Affect] : the affect was normal

## 2019-10-17 NOTE — HISTORY OF PRESENT ILLNESS
[FreeTextEntry1] : Dear Tres,\jose raul Thank you for referring him for cardiovascular evaluation.  He is a 59-year-old with a history of hypertension who is currently under treatment for CLL who presents to my office with an abnormal ECG.  He was seen in your office for routine medical evaluation and screening ECG showed T wave inversions in the anterior lateral leads.  The patient reports no chest pains or shortness of breath and exercises on a bicycle for about an hour covering approximately 8 miles without difficulty.  He stopped running because of foot pain, which has now resolved.\par He has no known history of coronary artery disease, diabetes mellitus or hypercholesterolemia.  He does have a family history of premature coronary artery disease and quit smoking over 30 years ago.\par He was recently observed in an emergency room for 24 hours for an episode of right arm numbness and dizziness.  CNS work-up including MRI/MRA was reportedly negative.  He has a remote history of gastric or esophageal  ulcer.\par

## 2019-10-17 NOTE — DISCUSSION/SUMMARY
[FreeTextEntry1] : He is a 59-year-old with a history of hypertension, family history of premature coronary artery disease and former smoker who presents with asymptomatic ECG abnormality.  I reviewed the possible sources for her abnormal ECG, including emergence of hypertrophic myopathy or ischemic heart disease.\par I reviewed the package inserts for the medications he had been on and is currently taking (possibly) and none appear to have any severe coronary related reported side effects.  His blood pressure is not optimal and I have suggested that he continue on his current regimen for the time being.  We will repeat his blood pressures during the stress echo and make further recommendations thereafter.\par

## 2019-10-23 LAB — CHROM ANALY INTERPHASE BLD FISH-IMP: SIGNIFICANT CHANGE UP

## 2019-10-29 LAB — CHROM ANALY OVERALL INTERP SPEC-IMP: SIGNIFICANT CHANGE UP

## 2019-11-04 ENCOUNTER — OTHER (OUTPATIENT)
Age: 59
End: 2019-11-04

## 2019-11-19 ENCOUNTER — APPOINTMENT (OUTPATIENT)
Dept: CARDIOLOGY | Facility: CLINIC | Age: 59
End: 2019-11-19
Payer: COMMERCIAL

## 2019-11-19 DIAGNOSIS — R94.39 ABNORMAL RESULT OF OTHER CARDIOVASCULAR FUNCTION STUDY: ICD-10-CM

## 2019-11-19 PROCEDURE — 93351 STRESS TTE COMPLETE: CPT

## 2019-11-19 PROCEDURE — 93320 DOPPLER ECHO COMPLETE: CPT

## 2019-11-19 PROCEDURE — 93325 DOPPLER ECHO COLOR FLOW MAPG: CPT

## 2019-11-26 ENCOUNTER — APPOINTMENT (OUTPATIENT)
Dept: INTERNAL MEDICINE | Facility: CLINIC | Age: 59
End: 2019-11-26
Payer: COMMERCIAL

## 2019-11-26 VITALS
HEART RATE: 64 BPM | HEIGHT: 68 IN | DIASTOLIC BLOOD PRESSURE: 89 MMHG | WEIGHT: 204 LBS | BODY MASS INDEX: 30.92 KG/M2 | SYSTOLIC BLOOD PRESSURE: 154 MMHG

## 2019-11-26 PROCEDURE — 99213 OFFICE O/P EST LOW 20 MIN: CPT

## 2019-11-26 RX ORDER — VALACYCLOVIR 1 G/1
1 TABLET, FILM COATED ORAL
Qty: 30 | Refills: 5 | Status: DISCONTINUED | COMMUNITY
Start: 2019-06-26 | End: 2019-11-26

## 2019-11-26 NOTE — ED PROVIDER NOTE - ENMT NEGATIVE STATEMENT, MLM
2 pt identifiers. Pt received vaccine. Pt requested to remain 15 minutes.      
Ears: no ear pain and no hearing problems.Nose: no nasal congestion and no nasal drainage.Mouth/Throat: no dysphagia, no hoarseness and no throat pain.Neck: no lumps, no pain, no stiffness and no swollen glands.

## 2019-11-26 NOTE — HISTORY OF PRESENT ILLNESS
[de-identified] : Mr. MATY WESLEY is a 59 year old male here today for a follow up of their chronic medical conditions.\par \par HTN: amlodipine was added \par Losartan 100 mg is now being taken properly \par He had a stress test \par \par \par

## 2019-11-26 NOTE — ASSESSMENT
[FreeTextEntry1] : HTN: bp improving\par low salt diet\par continue meds\par \par fu in 4 mo \par \par Again stressed importance for the Cologard

## 2019-12-04 ENCOUNTER — APPOINTMENT (OUTPATIENT)
Dept: INTERNAL MEDICINE | Facility: CLINIC | Age: 59
End: 2019-12-04
Payer: COMMERCIAL

## 2019-12-04 VITALS
SYSTOLIC BLOOD PRESSURE: 153 MMHG | HEART RATE: 62 BPM | BODY MASS INDEX: 31.07 KG/M2 | DIASTOLIC BLOOD PRESSURE: 94 MMHG | WEIGHT: 205 LBS | HEIGHT: 68 IN | TEMPERATURE: 97.7 F

## 2019-12-04 PROCEDURE — 99213 OFFICE O/P EST LOW 20 MIN: CPT

## 2019-12-04 PROCEDURE — 87880 STREP A ASSAY W/OPTIC: CPT | Mod: QW

## 2019-12-05 LAB — S PYO AG SPEC QL IA: NORMAL

## 2019-12-05 NOTE — ASSESSMENT
[FreeTextEntry1] : Sore throat: \par OTC meds\par Rapid strep negative \par Rest\par gargle with salt water \par fluids

## 2019-12-05 NOTE — HISTORY OF PRESENT ILLNESS
[FreeTextEntry8] : 59 year old male here today with c/o sore throat and a cold. \par Denies fevers. \par He has a mild cough. \par He has a post nasal drip. \par He is concerned for Strep.

## 2019-12-05 NOTE — PHYSICAL EXAM
[Normal] : normal rate, regular rhythm, normal S1 and S2 and no murmur heard [de-identified] : slightly reddened oropharnyx

## 2020-01-02 ENCOUNTER — APPOINTMENT (OUTPATIENT)
Dept: HEMATOLOGY ONCOLOGY | Facility: CLINIC | Age: 60
End: 2020-01-02
Payer: COMMERCIAL

## 2020-01-02 VITALS
HEART RATE: 57 BPM | OXYGEN SATURATION: 98 % | DIASTOLIC BLOOD PRESSURE: 90 MMHG | TEMPERATURE: 97.5 F | WEIGHT: 209 LBS | SYSTOLIC BLOOD PRESSURE: 140 MMHG | BODY MASS INDEX: 31.67 KG/M2 | HEIGHT: 68 IN | RESPIRATION RATE: 20 BRPM

## 2020-01-02 PROCEDURE — 99215 OFFICE O/P EST HI 40 MIN: CPT

## 2020-01-02 PROCEDURE — 85025 COMPLETE CBC W/AUTO DIFF WBC: CPT

## 2020-01-02 NOTE — PHYSICAL EXAM
[Fully active, able to carry on all pre-disease performance without restriction] : Status 0 - Fully active, able to carry on all pre-disease performance without restriction [Normal] : no peripheral adenopathy appreciated [de-identified] : EOMI, anicteric sclera, conjunctiva normal [de-identified] : DONALDO [de-identified] : no c/c/e [de-identified] : soft; NT/ND; NABS; no hepatosplenomegaly

## 2020-01-02 NOTE — ASSESSMENT
[FreeTextEntry1] : Mr. Don is a 59 year old male with CLL (diagnosed 2014) on [id number]-LI study who presents for follow-up per study protocol. this is visit cycle 32 day 2.\par  \par Plan:\par 1)CLL:\par -Continue study drugs as per protocol. He is due for CT abd/pelvis, neck, and chest with/without contrast on in ~3 months. \par \par 2)Hypertension: continue management per cards Currently on losartan 50mg and amlodipine daily.\par \par RTO per protocol or sooner PRN

## 2020-01-02 NOTE — HISTORY OF PRESENT ILLNESS
[de-identified] : Mr. Don is a 59 year old male with CLL (diagnosed 2014) on [id number] study who presents for follow-up per study protocol. this is visit cycle 32 day 2\par \par He reports having an infection left hallux,using topical iodine and distilled water. No fevers or drenching night sweats. Good appetite. Weight overall stable. Normal bowel movements. No chest pain or shortness of breath. No palpitations. Energy stable. Since last visit, he went to his cardiologist for regular follow-up and was started on amlodipine daily. He had a stress echo and told him that he will have a nuclear stress test 1/13/20 for more accurate assessment of his cardiac status before he starts exercising.\par \par  [de-identified] : CLL Markers:\par CD38+, ZAP70+\par unmutated IGHV\par TP53 mutation [FreeTextEntry1] : [id number]

## 2020-01-05 LAB
ALBUMIN SERPL ELPH-MCNC: 4.3 G/DL
ALP BLD-CCNC: 66 U/L
ALT SERPL-CCNC: 24 U/L
ANION GAP SERPL CALC-SCNC: 13 MMOL/L
AST SERPL-CCNC: 25 U/L
BILIRUB SERPL-MCNC: 0.6 MG/DL
BUN SERPL-MCNC: 18 MG/DL
CALCIUM SERPL-MCNC: 9.3 MG/DL
CHLORIDE SERPL-SCNC: 101 MMOL/L
CO2 SERPL-SCNC: 23 MMOL/L
CREAT SERPL-MCNC: 1.29 MG/DL
GLUCOSE SERPL-MCNC: 112 MG/DL
LDH SERPL-CCNC: 180 U/L
MAGNESIUM SERPL-MCNC: 2.1 MG/DL
PHOSPHATE SERPL-MCNC: 3.2 MG/DL
POTASSIUM SERPL-SCNC: 4.3 MMOL/L
PROT SERPL-MCNC: 7 G/DL
SODIUM SERPL-SCNC: 137 MMOL/L
URATE SERPL-MCNC: 5.8 MG/DL

## 2020-01-13 ENCOUNTER — APPOINTMENT (OUTPATIENT)
Dept: CARDIOLOGY | Facility: CLINIC | Age: 60
End: 2020-01-13
Payer: COMMERCIAL

## 2020-01-13 PROCEDURE — 78452 HT MUSCLE IMAGE SPECT MULT: CPT

## 2020-01-13 PROCEDURE — A9500: CPT

## 2020-01-13 PROCEDURE — 93015 CV STRESS TEST SUPVJ I&R: CPT

## 2020-03-05 ENCOUNTER — OUTPATIENT (OUTPATIENT)
Dept: OUTPATIENT SERVICES | Facility: HOSPITAL | Age: 60
LOS: 1 days | Discharge: HOME | End: 2020-03-05

## 2020-03-05 ENCOUNTER — APPOINTMENT (OUTPATIENT)
Dept: HEMATOLOGY ONCOLOGY | Facility: CLINIC | Age: 60
End: 2020-03-05
Payer: COMMERCIAL

## 2020-03-05 VITALS
SYSTOLIC BLOOD PRESSURE: 118 MMHG | BODY MASS INDEX: 31.78 KG/M2 | OXYGEN SATURATION: 98 % | HEART RATE: 78 BPM | TEMPERATURE: 98 F | DIASTOLIC BLOOD PRESSURE: 75 MMHG | WEIGHT: 209 LBS

## 2020-03-05 DIAGNOSIS — D64.9 ANEMIA, UNSPECIFIED: ICD-10-CM

## 2020-03-05 DIAGNOSIS — Z98.890 OTHER SPECIFIED POSTPROCEDURAL STATES: Chronic | ICD-10-CM

## 2020-03-05 PROCEDURE — 85025 COMPLETE CBC W/AUTO DIFF WBC: CPT

## 2020-03-05 PROCEDURE — 99215 OFFICE O/P EST HI 40 MIN: CPT

## 2020-03-05 NOTE — PHYSICAL EXAM
[Fully active, able to carry on all pre-disease performance without restriction] : Status 0 - Fully active, able to carry on all pre-disease performance without restriction [Normal] : affect appropriate [de-identified] : EOMI, anicteric sclera, conjunctiva normal [de-identified] : DONALDO [de-identified] : no c/c/e [de-identified] : soft; NT/ND; NABS; no hepatosplenomegaly

## 2020-03-05 NOTE — HISTORY OF PRESENT ILLNESS
[de-identified] : Mr. Don is a 59 year old male with CLL (diagnosed 2014) on [id number] study who presents for follow-up due to concerns over new onset of night sweats.\par He report having 4 consecutive days of drenching night sweats. He had an episode of chills, which resolved. No fevers, recent temp at home was 98 F. He reports no sick contacts. Currently completing 10/10 day course of Bactrim rx by podiatrist for toe nail infection. Denies productive cough, diarrhea, burning sensation during urination, abd pain, CP, and SOB. Appetite is good. Weight stable. \par \par He reports having an infection left hallux,using topical iodine and distilled water. No fevers or drenching night sweats. Good appetite. Weight overall stable. Normal bowel movements. No chest pain or shortness of breath. No palpitations. Energy stable. Since last visit, he went to his cardiologist for regular follow-up and was started on amlodipine daily. He had a stress echo and told him that he will have a nuclear stress test 1/13/20 for more accurate assessment of his cardiac status before he starts exercising.\par \par  [de-identified] : CLL Markers:\par CD38+, ZAP70+\par unmutated IGHV\par TP53 mutation [FreeTextEntry1] : [id number]

## 2020-03-05 NOTE — ASSESSMENT
[FreeTextEntry1] : Mr. Don is a 59 year old male with CLL (diagnosed 2014) on [id number]-0253-UD study who presents for follow-up due to concerns over new onset of night sweats. \par  \par Plan:\par 1)CLL:\par -Continue study drugs as per protocol. He is due for CT abd/pelvis, neck, and chest with/without contrast on in ~3 months. \par -CBC reviewed, counts overall stable\par -will follow up on urine cx and blood cx\par -will also send out flow cyrtometry \par \par 2)Hypertension: continue management per cards Currently on losartan 50mg and amlodipine daily.\par \par RTO per protocol or sooner PRN\par

## 2020-03-05 NOTE — PHYSICAL EXAM
[Fully active, able to carry on all pre-disease performance without restriction] : Status 0 - Fully active, able to carry on all pre-disease performance without restriction [Normal] : affect appropriate [de-identified] : EOMI, anicteric sclera, conjunctiva normal [de-identified] : DONALDO [de-identified] : no c/c/e [de-identified] : soft; NT/ND; NABS; no hepatosplenomegaly

## 2020-03-05 NOTE — RESULTS/DATA
[FreeTextEntry1] : CBC reviewed: 3/5/20:\par WBC: 4.7\par ALC 1.22\par ANC 2.55\par Hgb 15.9\par HCT 46.8\par Plts 139.0

## 2020-03-05 NOTE — ASSESSMENT
[FreeTextEntry1] : Mr. Don is a 59 year old male with CLL (diagnosed 2014) on BNTJ-3677-EC study who presents for follow-up due to concerns over new onset of night sweats. \par  \par Plan:\par 1)CLL:\par -Continue study drugs as per protocol. He is due for CT abd/pelvis, neck, and chest with/without contrast on in ~3 months. \par -CBC reviewed, counts overall stable\par -will follow up on urine cx and blood cx\par -will also send out flow cyrtometry \par \par 2)Hypertension: continue management per cards Currently on losartan 50mg and amlodipine daily.\par \par RTO per protocol or sooner PRN\par

## 2020-03-05 NOTE — HISTORY OF PRESENT ILLNESS
[de-identified] : Mr. Don is a 59 year old male with CLL (diagnosed 2014) on QWRS-8429-IK study who presents for follow-up due to concerns over new onset of night sweats.\par He report having 4 consecutive days of drenching night sweats. He had an episode of chills, which resolved. No fevers, recent temp at home was 98 F. He reports no sick contacts. Currently completing 10/10 day course of Bactrim rx by podiatrist for toe nail infection. Denies productive cough, diarrhea, burning sensation during urination, abd pain, CP, and SOB. Appetite is good. Weight stable. \par \par He reports having an infection left hallux,using topical iodine and distilled water. No fevers or drenching night sweats. Good appetite. Weight overall stable. Normal bowel movements. No chest pain or shortness of breath. No palpitations. Energy stable. Since last visit, he went to his cardiologist for regular follow-up and was started on amlodipine daily. He had a stress echo and told him that he will have a nuclear stress test 1/13/20 for more accurate assessment of his cardiac status before he starts exercising.\par \par  [de-identified] : CLL Markers:\par CD38+, ZAP70+\par unmutated IGHV\par TP53 mutation [FreeTextEntry1] : [id number]

## 2020-03-05 NOTE — REVIEW OF SYSTEMS
[Night Sweats] : night sweats [Negative] : Allergic/Immunologic [FreeTextEntry4] : dry nose, grade 1 sores in nose, using metronidazole 0.75% cream as needed

## 2020-03-05 NOTE — ASSESSMENT
[FreeTextEntry1] : Mr. Don is a 59 year old male with CLL (diagnosed 2014) on VXGO-5011-DO study who presents for follow-up due to concerns over new onset of night sweats. \par  \par Plan:\par 1)CLL:\par -Continue study drugs as per protocol. He is due for CT abd/pelvis, neck, and chest with/without contrast on in ~3 months. \par -CBC reviewed, counts overall stable\par -will follow up on urine cx and blood cx\par -will also send out flow cyrtometry \par \par 2)Hypertension: continue management per cards Currently on losartan 50mg and amlodipine daily.\par \par RTO per protocol or sooner PRN\par

## 2020-03-05 NOTE — HISTORY OF PRESENT ILLNESS
[de-identified] : Mr. Don is a 59 year old male with CLL (diagnosed 2014) on ZSUB-2065-KD study who presents for follow-up due to concerns over new onset of night sweats.\par He report having 4 consecutive days of drenching night sweats. He had an episode of chills, which resolved. No fevers, recent temp at home was 98 F. He reports no sick contacts. Currently completing 10/10 day course of Bactrim rx by podiatrist for toe nail infection. Denies productive cough, diarrhea, burning sensation during urination, abd pain, CP, and SOB. Appetite is good. Weight stable. \par \par He reports having an infection left hallux,using topical iodine and distilled water. No fevers or drenching night sweats. Good appetite. Weight overall stable. Normal bowel movements. No chest pain or shortness of breath. No palpitations. Energy stable. Since last visit, he went to his cardiologist for regular follow-up and was started on amlodipine daily. He had a stress echo and told him that he will have a nuclear stress test 1/13/20 for more accurate assessment of his cardiac status before he starts exercising.\par \par  [de-identified] : CLL Markers:\par CD38+, ZAP70+\par unmutated IGHV\par TP53 mutation [FreeTextEntry1] : [id number]

## 2020-03-05 NOTE — PHYSICAL EXAM
[Fully active, able to carry on all pre-disease performance without restriction] : Status 0 - Fully active, able to carry on all pre-disease performance without restriction [Normal] : affect appropriate [de-identified] : EOMI, anicteric sclera, conjunctiva normal [de-identified] : DONALDO [de-identified] : no c/c/e [de-identified] : soft; NT/ND; NABS; no hepatosplenomegaly

## 2020-03-19 ENCOUNTER — LABORATORY RESULT (OUTPATIENT)
Age: 60
End: 2020-03-19

## 2020-03-19 LAB
ALBUMIN SERPL ELPH-MCNC: 4.5 G/DL
ALP BLD-CCNC: 79 U/L
ALT SERPL-CCNC: 33 U/L
ANION GAP SERPL CALC-SCNC: 13 MMOL/L
APPEARANCE: CLEAR
AST SERPL-CCNC: 33 U/L
BACTERIA BLD CULT: NORMAL
BACTERIA UR CULT: NORMAL
BILIRUB SERPL-MCNC: 0.4 MG/DL
BILIRUBIN URINE: NEGATIVE
BLOOD URINE: NEGATIVE
BUN SERPL-MCNC: 18 MG/DL
CALCIUM SERPL-MCNC: 9.2 MG/DL
CHLORIDE SERPL-SCNC: 101 MMOL/L
CO2 SERPL-SCNC: 20 MMOL/L
COLOR: YELLOW
CREAT SERPL-MCNC: 1.41 MG/DL
GLUCOSE QUALITATIVE U: NEGATIVE
GLUCOSE SERPL-MCNC: 105 MG/DL
KETONES URINE: NEGATIVE
LDH SERPL-CCNC: 219 U/L
LEUKOCYTE ESTERASE URINE: NEGATIVE
MAGNESIUM SERPL-MCNC: 2.4 MG/DL
NITRITE URINE: NEGATIVE
PH URINE: 6
PHOSPHATE SERPL-MCNC: 3.4 MG/DL
POTASSIUM SERPL-SCNC: 4.4 MMOL/L
PROT SERPL-MCNC: 7.3 G/DL
PROTEIN URINE: NORMAL
SODIUM SERPL-SCNC: 134 MMOL/L
SPECIFIC GRAVITY URINE: 1.03
URATE SERPL-MCNC: 4.6 MG/DL
UROBILINOGEN URINE: NORMAL

## 2020-03-21 ENCOUNTER — OUTPATIENT (OUTPATIENT)
Dept: OUTPATIENT SERVICES | Facility: HOSPITAL | Age: 60
LOS: 1 days | End: 2020-03-21
Payer: SUBSIDIZED

## 2020-03-21 ENCOUNTER — RESULT REVIEW (OUTPATIENT)
Age: 60
End: 2020-03-21

## 2020-03-21 ENCOUNTER — APPOINTMENT (OUTPATIENT)
Dept: CT IMAGING | Facility: IMAGING CENTER | Age: 60
End: 2020-03-21
Payer: SUBSIDIZED

## 2020-03-21 DIAGNOSIS — Z98.890 OTHER SPECIFIED POSTPROCEDURAL STATES: Chronic | ICD-10-CM

## 2020-03-21 DIAGNOSIS — C91.10 CHRONIC LYMPHOCYTIC LEUKEMIA OF B-CELL TYPE NOT HAVING ACHIEVED REMISSION: ICD-10-CM

## 2020-03-21 PROCEDURE — 70491 CT SOFT TISSUE NECK W/DYE: CPT

## 2020-03-21 PROCEDURE — 74177 CT ABD & PELVIS W/CONTRAST: CPT

## 2020-03-21 PROCEDURE — 70491 CT SOFT TISSUE NECK W/DYE: CPT | Mod: 26

## 2020-03-21 PROCEDURE — 71260 CT THORAX DX C+: CPT | Mod: 26

## 2020-03-21 PROCEDURE — 74177 CT ABD & PELVIS W/CONTRAST: CPT | Mod: 26

## 2020-03-21 PROCEDURE — 71260 CT THORAX DX C+: CPT

## 2020-03-23 ENCOUNTER — APPOINTMENT (OUTPATIENT)
Dept: HEMATOLOGY ONCOLOGY | Facility: CLINIC | Age: 60
End: 2020-03-23
Payer: COMMERCIAL

## 2020-03-23 VITALS
DIASTOLIC BLOOD PRESSURE: 90 MMHG | OXYGEN SATURATION: 98 % | RESPIRATION RATE: 16 BRPM | WEIGHT: 204 LBS | TEMPERATURE: 97.7 F | HEART RATE: 60 BPM | SYSTOLIC BLOOD PRESSURE: 140 MMHG | BODY MASS INDEX: 31.02 KG/M2

## 2020-03-23 PROCEDURE — 85025 COMPLETE CBC W/AUTO DIFF WBC: CPT

## 2020-03-23 PROCEDURE — 99215 OFFICE O/P EST HI 40 MIN: CPT

## 2020-03-23 NOTE — PHYSICAL EXAM
[Fully active, able to carry on all pre-disease performance without restriction] : Status 0 - Fully active, able to carry on all pre-disease performance without restriction [Normal] : affect appropriate [de-identified] : EOMI, anicteric sclera, conjunctiva normal [de-identified] : DONALDO [de-identified] : no c/c/e

## 2020-03-23 NOTE — ASSESSMENT
[FreeTextEntry1] : Mr. Don is a 60 year old male with CLL (diagnosed 2014) on QQLQ-3781-WB study who presents for follow-up due to concerns over new onset of night sweats. \par  \par Plan:\par 1)CLL:\par -Continue study drugs as per protocol. Reviewed CT scan results with patient\par -CBC reviewed, counts overall stable\par \par 2)Hypertension: continue management per cards Currently on losartan 50mg and amlodipine daily.\par \par RTO per protocol or sooner PRN\par

## 2020-03-23 NOTE — HISTORY OF PRESENT ILLNESS
[de-identified] : Mr. Don is a 60 year old male with CLL (diagnosed 2014) on EQZP-8000-EK study who presents for follow-up. He denies any new fevers of chills. He reports no sick contacts. Denies productive cough, diarrhea, burning sensation during urination, abd pain, CP, and SOB. Appetite is good. Weight stable. No chest pain or shortness of breath. No palpitations. Energy stable. Since last visit, the coronavirus epidemic has dramatically increased and he reports staying at home as much as possible. 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 We reviewed his CT scan results, overall all stable. He has an indeterminate lesion in the right kidney along with several cysts--he was aware of the renal cysts but not of the lesion. Report states lesion is stable--given lack of other symptoms, it would be reasonable to continue to monitor for the next couple of months and all non-urgent visits are no longer an option due to COVID19 outbreak.\par \par  [de-identified] : CLL Markers:\par CD38+, ZAP70+\par unmutated IGHV\par TP53 mutation [Treatment Protocol] : Treatment Protocol [FreeTextEntry1] : [id number]

## 2020-03-24 ENCOUNTER — APPOINTMENT (OUTPATIENT)
Dept: INTERNAL MEDICINE | Facility: CLINIC | Age: 60
End: 2020-03-24

## 2020-03-29 LAB
ALBUMIN SERPL ELPH-MCNC: 4.6 G/DL
ALP BLD-CCNC: 61 U/L
ALT SERPL-CCNC: 29 U/L
ANION GAP SERPL CALC-SCNC: 21 MMOL/L
AST SERPL-CCNC: 30 U/L
BILIRUB SERPL-MCNC: 0.5 MG/DL
BUN SERPL-MCNC: 17 MG/DL
CALCIUM SERPL-MCNC: 9.2 MG/DL
CHLORIDE SERPL-SCNC: 101 MMOL/L
CO2 SERPL-SCNC: 18 MMOL/L
CREAT SERPL-MCNC: 1.12 MG/DL
GLUCOSE SERPL-MCNC: 84 MG/DL
LDH SERPL-CCNC: 184 U/L
MAGNESIUM SERPL-MCNC: 2.1 MG/DL
PHOSPHATE SERPL-MCNC: 3 MG/DL
POTASSIUM SERPL-SCNC: 4.2 MMOL/L
PROT SERPL-MCNC: 7.2 G/DL
SODIUM SERPL-SCNC: 140 MMOL/L
URATE SERPL-MCNC: 5.6 MG/DL

## 2020-06-17 ENCOUNTER — APPOINTMENT (OUTPATIENT)
Dept: HEMATOLOGY ONCOLOGY | Facility: CLINIC | Age: 60
End: 2020-06-17
Payer: COMMERCIAL

## 2020-06-17 LAB
ALBUMIN SERPL ELPH-MCNC: 4.2 G/DL
ALP BLD-CCNC: 62 U/L
ALT SERPL-CCNC: 17 U/L
ANION GAP SERPL CALC-SCNC: 14 MMOL/L
AST SERPL-CCNC: 20 U/L
BASOPHILS # BLD AUTO: 0.08 K/UL
BASOPHILS NFR BLD AUTO: 1 %
BILIRUB SERPL-MCNC: 0.6 MG/DL
BUN SERPL-MCNC: 17 MG/DL
CALCIUM SERPL-MCNC: 8.9 MG/DL
CHLORIDE SERPL-SCNC: 101 MMOL/L
CO2 SERPL-SCNC: 22 MMOL/L
CREAT SERPL-MCNC: 1.16 MG/DL
EOSINOPHIL # BLD AUTO: 0.15 K/UL
EOSINOPHIL NFR BLD AUTO: 2 %
GLUCOSE SERPL-MCNC: 113 MG/DL
HCT VFR BLD CALC: 44.6 %
HGB BLD-MCNC: 14.3 G/DL
IMM GRANULOCYTES NFR BLD AUTO: 0.4 %
LDH SERPL-CCNC: 160 U/L
LYMPHOCYTES # BLD AUTO: 1.87 K/UL
LYMPHOCYTES NFR BLD AUTO: 24.5 %
MAGNESIUM SERPL-MCNC: 2.2 MG/DL
MAN DIFF?: NORMAL
MCHC RBC-ENTMCNC: 28.9 PG
MCHC RBC-ENTMCNC: 32.1 GM/DL
MCV RBC AUTO: 90.1 FL
MONOCYTES # BLD AUTO: 0.6 K/UL
MONOCYTES NFR BLD AUTO: 7.9 %
NEUTROPHILS # BLD AUTO: 4.89 K/UL
NEUTROPHILS NFR BLD AUTO: 64.2 %
PHOSPHATE SERPL-MCNC: 3.1 MG/DL
PLATELET # BLD AUTO: 156 K/UL
POTASSIUM SERPL-SCNC: 3.8 MMOL/L
PROT SERPL-MCNC: 6.8 G/DL
RBC # BLD: 4.95 M/UL
RBC # FLD: 13.5 %
SODIUM SERPL-SCNC: 138 MMOL/L
URATE SERPL-MCNC: 5.8 MG/DL
WBC # FLD AUTO: 7.62 K/UL

## 2020-06-17 PROCEDURE — 99215 OFFICE O/P EST HI 40 MIN: CPT | Mod: 95

## 2020-06-22 VITALS — DIASTOLIC BLOOD PRESSURE: 87 MMHG | SYSTOLIC BLOOD PRESSURE: 131 MMHG

## 2020-06-22 VITALS — TEMPERATURE: 98.5 F | HEART RATE: 64 BPM | BODY MASS INDEX: 30.26 KG/M2 | RESPIRATION RATE: 16 BRPM | WEIGHT: 199 LBS

## 2020-06-22 NOTE — PHYSICAL EXAM
[Fully active, able to carry on all pre-disease performance without restriction] : Status 0 - Fully active, able to carry on all pre-disease performance without restriction [Normal] : affect appropriate [de-identified] : EOMI, anicteric sclera, conjunctiva normal [de-identified] : aox3

## 2020-06-22 NOTE — ASSESSMENT
[FreeTextEntry1] : Mr. Don is a 60 year old male with CLL (diagnosed 2014) on WEZZ-0861-JR study who presents for follow-up. We discussed new consent and he agreed to continue participation. He signed consent and his wife was witness.\par \par Plan:\par 1)CLL:\par -CBC reviewed, counts overall stable. Pt signed consent to continue on study.\par \par 2)Hypertension: continue management per cards\par \par RTO per protocol or sooner PRN\par

## 2020-06-22 NOTE — HISTORY OF PRESENT ILLNESS
[Home] : at home, [unfilled] , at the time of the visit. [Verbal consent obtained from patient] : the patient, [unfilled] [Medical Office: ([facility])___] : at the medical office located in  [Treatment Protocol] : Treatment Protocol [Disease:__________________________] : Disease: [unfilled] [de-identified] : Mr. Don is a 60 year old male with CLL (diagnosed 2014) on [id number] study who presents for follow-up. He denies any new fevers of chills. He reports no sick contacts. Denies productive cough, diarrhea, burning sensation during urination, abdominal pain, CP, and SOB. Appetite is good. Weight stable. No chest pain or shortness of breath. No palpitations. Energy stable.\par Note: last CT scan results noted an indeterminate lesion in the right kidney along with several cysts--he was aware of the renal cysts but not of the lesion. Report states lesion is stable--given lack of other symptoms, it would be reasonable to continue to monitor for the next couple of months and all non-urgent visits are no longer an option due to COVID19 outbreak.\par \par  [de-identified] : CLL Markers:\par CD38+, ZAP70+\par unmutated IGHV\par TP53 mutation [FreeTextEntry1] : [id number]

## 2020-07-16 ENCOUNTER — RX CHANGE (OUTPATIENT)
Age: 60
End: 2020-07-16

## 2020-07-17 ENCOUNTER — RX CHANGE (OUTPATIENT)
Age: 60
End: 2020-07-17

## 2020-08-24 ENCOUNTER — RX RENEWAL (OUTPATIENT)
Age: 60
End: 2020-08-24

## 2020-09-06 LAB — SARS-COV-2 N GENE NPH QL NAA+PROBE: NOT DETECTED

## 2020-09-09 ENCOUNTER — APPOINTMENT (OUTPATIENT)
Dept: HEMATOLOGY ONCOLOGY | Facility: CLINIC | Age: 60
End: 2020-09-09

## 2020-09-10 ENCOUNTER — APPOINTMENT (OUTPATIENT)
Dept: HEMATOLOGY ONCOLOGY | Facility: CLINIC | Age: 60
End: 2020-09-10
Payer: COMMERCIAL

## 2020-09-10 VITALS
RESPIRATION RATE: 16 BRPM | TEMPERATURE: 98.8 F | SYSTOLIC BLOOD PRESSURE: 138 MMHG | HEART RATE: 72 BPM | OXYGEN SATURATION: 98 % | DIASTOLIC BLOOD PRESSURE: 84 MMHG

## 2020-09-10 PROCEDURE — 99215 OFFICE O/P EST HI 40 MIN: CPT

## 2020-09-12 VITALS — BODY MASS INDEX: 30.01 KG/M2 | WEIGHT: 198 LBS | HEIGHT: 68 IN

## 2020-09-12 NOTE — REVIEW OF SYSTEMS
[Negative] : Allergic/Immunologic [Fever] : no fever [Fatigue] : no fatigue [Recent Change In Weight] : ~T no recent weight change [Chills] : no chills [Night Sweats] : no night sweats [Dysphagia] : no dysphagia [Vision Problems] : no vision problems [Chest Pain] : no chest pain [Palpitations] : no palpitations [Shortness Of Breath] : no shortness of breath [Cough] : no cough [Wheezing] : no wheezing [SOB on Exertion] : no shortness of breath during exertion [Constipation] : no constipation [Abdominal Pain] : no abdominal pain [Vomiting] : no vomiting [Muscle Pain] : no muscle pain [Dysuria] : no dysuria [Incontinence] : no incontinence [Joint Pain] : no joint pain [Insomnia] : no insomnia [Skin Rash] : no skin rash [Dizziness] : no dizziness [Easy Bruising] : no tendency for easy bruising [Easy Bleeding] : no tendency for easy bleeding

## 2020-09-12 NOTE — ASSESSMENT
[FreeTextEntry1] : Mr. Don is a 60 year old male with CLL (diagnosed 2014) on [id number] study who presents for follow-up. He is agreeable to continue his participation in the study. \par \par Plan:\par 1)CLL:\par -CBC reviewed, counts overall stable. Given the patient's improvement radiographically with his lymph nodes size reduced patient may be in complete remission. Will need to check w monitors if a bone marrow biopsy is needed per protocol to determine that he is in complete remission.\par \par RTO per protocol.

## 2020-09-12 NOTE — HISTORY OF PRESENT ILLNESS
[Disease:__________________________] : Disease: [unfilled] [de-identified] : Mr. Don is a 60 year old male with CLL (diagnosed 2014) on [id number] study who presents for follow-up. He denies any new fevers of chills. He reports no sick contacts. Denies productive cough, burning sensation during urination, abdominal pain, CP, and SOB. Appetite is good. Weight stable. No chest pain or shortness of breath. No palpitations. Energy stable. He states that sometimes he has a rash on the corner of his mouth that comes and goes but states that it is non-pruritic and non-purulent and usually self-resolves in a matter of a couple of days. [de-identified] : CLL Markers:\par CD38+, ZAP70+\par unmutated IGHV\par TP53 mutation

## 2020-09-26 ENCOUNTER — TRANSCRIPTION ENCOUNTER (OUTPATIENT)
Age: 60
End: 2020-09-26

## 2020-09-28 ENCOUNTER — APPOINTMENT (OUTPATIENT)
Dept: RADIOLOGY | Facility: CLINIC | Age: 60
End: 2020-09-28
Payer: COMMERCIAL

## 2020-09-28 ENCOUNTER — OUTPATIENT (OUTPATIENT)
Dept: OUTPATIENT SERVICES | Facility: HOSPITAL | Age: 60
LOS: 1 days | End: 2020-09-28
Payer: COMMERCIAL

## 2020-09-28 DIAGNOSIS — Z98.890 OTHER SPECIFIED POSTPROCEDURAL STATES: Chronic | ICD-10-CM

## 2020-09-28 DIAGNOSIS — C91.10 CHRONIC LYMPHOCYTIC LEUKEMIA OF B-CELL TYPE NOT HAVING ACHIEVED REMISSION: ICD-10-CM

## 2020-09-28 PROCEDURE — 71046 X-RAY EXAM CHEST 2 VIEWS: CPT | Mod: 26

## 2020-09-28 PROCEDURE — 71046 X-RAY EXAM CHEST 2 VIEWS: CPT

## 2020-10-01 ENCOUNTER — LABORATORY RESULT (OUTPATIENT)
Age: 60
End: 2020-10-01

## 2020-10-01 ENCOUNTER — OUTPATIENT (OUTPATIENT)
Dept: OUTPATIENT SERVICES | Facility: HOSPITAL | Age: 60
LOS: 1 days | End: 2020-10-01
Payer: SUBSIDIZED

## 2020-10-01 ENCOUNTER — APPOINTMENT (OUTPATIENT)
Dept: HEMATOLOGY ONCOLOGY | Facility: CLINIC | Age: 60
End: 2020-10-01
Payer: COMMERCIAL

## 2020-10-01 ENCOUNTER — NON-APPOINTMENT (OUTPATIENT)
Age: 60
End: 2020-10-01

## 2020-10-01 DIAGNOSIS — Z98.890 OTHER SPECIFIED POSTPROCEDURAL STATES: Chronic | ICD-10-CM

## 2020-10-01 PROCEDURE — 88275 CYTOGENETICS 100-300: CPT

## 2020-10-01 PROCEDURE — 38221 DX BONE MARROW BIOPSIES: CPT

## 2020-10-01 PROCEDURE — 88264 CHROMOSOME ANALYSIS 20-25: CPT

## 2020-10-01 PROCEDURE — 88291 CYTO/MOLECULAR REPORT: CPT

## 2020-10-01 PROCEDURE — 85025 COMPLETE CBC W/AUTO DIFF WBC: CPT

## 2020-10-01 PROCEDURE — 88271 CYTOGENETICS DNA PROBE: CPT

## 2020-10-01 PROCEDURE — 88237 TISSUE CULTURE BONE MARROW: CPT

## 2020-10-01 PROCEDURE — 88280 CHROMOSOME KARYOTYPE STUDY: CPT

## 2020-10-02 DIAGNOSIS — C91.10 CHRONIC LYMPHOCYTIC LEUKEMIA OF B-CELL TYPE NOT HAVING ACHIEVED REMISSION: ICD-10-CM

## 2020-10-03 NOTE — PROCEDURE
[Bone Marrow Biopsy] : bone marrow biopsy [Bone Marrow Aspiration] : bone marrow aspiration  [Patient] : the patient [Patient identification verified] : patient identification verified [Procedure verified and consent obtained] : procedure verified and consent obtained [Laterality verified and correct site marked] : laterality verified and correct site marked [Left] : site: left [Correct positioning] : correct positioning [Correct implant and/ or special equipment obtained] : correct impact and/ or special equipment obtained [Prone] : prone [Superior iliac spine was identified] : the superior iliac spine was identified. [The left posterior iliac crest was prepped with betadine and draped, using sterile technique.] : The left posterior iliac crest was prepped with betadine and draped, using sterile technique. [Lidocaine was injected and into the periosteum overlying the site.] : Lidocaine was injected and into the periosteum overlying the site. [Aspirate] : aspirate [FISH] : FISH [Other ___] : [unfilled] [Biopsy] : biopsy [Flow Cytometry] : flow cytometry [] : The patient was instructed to remove the bandage the following AM. The patient may bathe. Acetaminophen may be taken for discomfort, as per package directions.If there are any other problems, the patient was instructed to call the office. The patient verbalized understanding, and is aware of the office contact numbers.

## 2020-10-06 ENCOUNTER — LABORATORY RESULT (OUTPATIENT)
Age: 60
End: 2020-10-06

## 2020-10-06 ENCOUNTER — APPOINTMENT (OUTPATIENT)
Dept: INTERNAL MEDICINE | Facility: CLINIC | Age: 60
End: 2020-10-06
Payer: COMMERCIAL

## 2020-10-06 VITALS
WEIGHT: 195 LBS | BODY MASS INDEX: 29.55 KG/M2 | DIASTOLIC BLOOD PRESSURE: 83 MMHG | HEART RATE: 71 BPM | SYSTOLIC BLOOD PRESSURE: 129 MMHG | HEIGHT: 68 IN | TEMPERATURE: 98 F

## 2020-10-06 PROCEDURE — 99214 OFFICE O/P EST MOD 30 MIN: CPT | Mod: 25

## 2020-10-06 PROCEDURE — 36415 COLL VENOUS BLD VENIPUNCTURE: CPT

## 2020-10-06 RX ORDER — CEFADROXIL 500 MG/1
500 CAPSULE ORAL TWICE DAILY
Refills: 0 | Status: DISCONTINUED | COMMUNITY
Start: 2020-01-09 | End: 2020-10-06

## 2020-10-07 ENCOUNTER — RX RENEWAL (OUTPATIENT)
Age: 60
End: 2020-10-07

## 2020-10-07 LAB
ALBUMIN SERPL ELPH-MCNC: 4.3 G/DL
ALP BLD-CCNC: 73 U/L
ALT SERPL-CCNC: 13 U/L
ANION GAP SERPL CALC-SCNC: 13 MMOL/L
AST SERPL-CCNC: 14 U/L
B BURGDOR IGG+IGM SER QL IB: NORMAL
BASOPHILS # BLD AUTO: 0.13 K/UL
BASOPHILS NFR BLD AUTO: 1.7 %
BILIRUB SERPL-MCNC: 0.3 MG/DL
BUN SERPL-MCNC: 16 MG/DL
CALCIUM SERPL-MCNC: 9.6 MG/DL
CHLORIDE SERPL-SCNC: 101 MMOL/L
CHOLEST SERPL-MCNC: 175 MG/DL
CHOLEST/HDLC SERPL: 4.6 RATIO
CO2 SERPL-SCNC: 22 MMOL/L
CREAT SERPL-MCNC: 1.31 MG/DL
EOSINOPHIL # BLD AUTO: 0.27 K/UL
EOSINOPHIL NFR BLD AUTO: 3.5 %
ERYTHROCYTE [SEDIMENTATION RATE] IN BLOOD BY WESTERGREN METHOD: 17 MM/HR
GLUCOSE SERPL-MCNC: 87 MG/DL
HCT VFR BLD CALC: 43.3 %
HDLC SERPL-MCNC: 38 MG/DL
HGB BLD-MCNC: 13.7 G/DL
LDLC SERPL CALC-MCNC: 107 MG/DL
LYMPHOCYTES # BLD AUTO: 1.66 K/UL
LYMPHOCYTES NFR BLD AUTO: 21.7 %
MAN DIFF?: NORMAL
MCHC RBC-ENTMCNC: 28.5 PG
MCHC RBC-ENTMCNC: 31.6 GM/DL
MCV RBC AUTO: 90.2 FL
MONOCYTES # BLD AUTO: 0.86 K/UL
MONOCYTES NFR BLD AUTO: 11.3 %
NEUTROPHILS # BLD AUTO: 4.45 K/UL
NEUTROPHILS NFR BLD AUTO: 58.3 %
PLATELET # BLD AUTO: 283 K/UL
POTASSIUM SERPL-SCNC: 4.6 MMOL/L
PROT SERPL-MCNC: 6.9 G/DL
RBC # BLD: 4.8 M/UL
RBC # FLD: 13.4 %
SAVE SPECIMEN: NORMAL
SODIUM SERPL-SCNC: 135 MMOL/L
TRIGL SERPL-MCNC: 151 MG/DL
WBC # FLD AUTO: 7.63 K/UL

## 2020-10-07 NOTE — HISTORY OF PRESENT ILLNESS
[FreeTextEntry8] : CC: Fever last week\par \par HPI: \par About 10- days ago developed fever up to 102 for two days, then down to max of 101. Had a Left great toe infection for which he started Bactrim on the day he developed the fever. He continued the Bactrim for 10 days and by then had no fever.  The toe has greatly improved.\par He has had some wheezing. Went to [facility] about 9 days ago and had a negative COVID-19 test.  \par

## 2020-10-07 NOTE — ASSESSMENT
[FreeTextEntry1] : FUO: Resolving; Could this be a be a reaction to Sulfa?\par I had reviewed previous blood work.\par \par Plan:\par ESR\par CBC\par Lyme titers;\par \par

## 2020-10-07 NOTE — ADDENDUM
[FreeTextEntry1] : 10/07/2020: Lyme titers came back as positive.\par Will start Doxycycline 100 mg twice daily.

## 2020-10-07 NOTE — PHYSICAL EXAM
[No Acute Distress] : no acute distress [No Respiratory Distress] : no respiratory distress  [No Accessory Muscle Use] : no accessory muscle use [Normal Rate] : normal rate  [Regular Rhythm] : with a regular rhythm [Soft] : abdomen soft [Non Tender] : non-tender [No HSM] : no HSM [Speech Grossly Normal] : speech grossly normal [Normal Mood] : the mood was normal [No Murmur] : no murmur heard [de-identified] : Toe is not infected

## 2020-10-07 NOTE — REVIEW OF SYSTEMS
[Chills] : chills [Night Sweats] : night sweats [Nasal Discharge] : no nasal discharge [Sore Throat] : no sore throat [Abdominal Pain] : no abdominal pain

## 2020-10-15 LAB
CHROM ANALY INTERPHASE BLD FISH-IMP: SIGNIFICANT CHANGE UP
CHROM ANALY OVERALL INTERP SPEC-IMP: SIGNIFICANT CHANGE UP

## 2020-10-22 LAB
ALBUMIN SERPL ELPH-MCNC: 4.5 G/DL
ALP BLD-CCNC: 67 U/L
ALT SERPL-CCNC: 23 U/L
ANION GAP SERPL CALC-SCNC: 14 MMOL/L
APPEARANCE: CLEAR
AST SERPL-CCNC: 20 U/L
BACTERIA BLD CULT: NORMAL
BACTERIA UR CULT: NORMAL
BACTERIA: NEGATIVE
BILIRUB SERPL-MCNC: 0.4 MG/DL
BILIRUBIN URINE: NEGATIVE
BLOOD URINE: NEGATIVE
BUN SERPL-MCNC: 13 MG/DL
CALCIUM SERPL-MCNC: 9.3 MG/DL
CHLORIDE SERPL-SCNC: 101 MMOL/L
CMV DNA SPEC QL NAA+PROBE: NOT DETECTED IU/ML
CO2 SERPL-SCNC: 24 MMOL/L
COLOR: YELLOW
CREAT SERPL-MCNC: 1.09 MG/DL
EBV DNA SERPL NAA+PROBE-ACNC: NOT DETECTED IU/ML
GLUCOSE QUALITATIVE U: NEGATIVE
GLUCOSE SERPL-MCNC: 119 MG/DL
HYALINE CASTS: 1 /LPF
KETONES URINE: NEGATIVE
LEUKOCYTE ESTERASE URINE: NEGATIVE
MICROSCOPIC-UA: NORMAL
NITRITE URINE: NEGATIVE
PH URINE: 6
POTASSIUM SERPL-SCNC: 3.8 MMOL/L
PROT SERPL-MCNC: 6.9 G/DL
PROTEIN URINE: NORMAL
RED BLOOD CELLS URINE: 1 /HPF
SARS-COV-2 IGG SERPL IA-ACNC: 0.09 INDEX
SARS-COV-2 IGG SERPL QL IA: NEGATIVE
SODIUM SERPL-SCNC: 138 MMOL/L
SPECIFIC GRAVITY URINE: 1.02
SQUAMOUS EPITHELIAL CELLS: 1 /HPF
UROBILINOGEN URINE: NORMAL
WHITE BLOOD CELLS URINE: 3 /HPF

## 2020-10-23 ENCOUNTER — RX RENEWAL (OUTPATIENT)
Age: 60
End: 2020-10-23

## 2020-11-12 ENCOUNTER — NON-APPOINTMENT (OUTPATIENT)
Age: 60
End: 2020-11-12

## 2020-12-02 ENCOUNTER — APPOINTMENT (OUTPATIENT)
Dept: HEMATOLOGY ONCOLOGY | Facility: CLINIC | Age: 60
End: 2020-12-02
Payer: COMMERCIAL

## 2020-12-02 ENCOUNTER — NON-APPOINTMENT (OUTPATIENT)
Age: 60
End: 2020-12-02

## 2020-12-02 VITALS
HEART RATE: 58 BPM | SYSTOLIC BLOOD PRESSURE: 128 MMHG | WEIGHT: 202 LBS | DIASTOLIC BLOOD PRESSURE: 85 MMHG | TEMPERATURE: 98 F | BODY MASS INDEX: 30.71 KG/M2 | OXYGEN SATURATION: 99 %

## 2020-12-02 LAB — SARS-COV-2 N GENE NPH QL NAA+PROBE: NOT DETECTED

## 2020-12-02 PROCEDURE — 99215 OFFICE O/P EST HI 40 MIN: CPT

## 2020-12-02 PROCEDURE — 99072 ADDL SUPL MATRL&STAF TM PHE: CPT

## 2020-12-04 LAB
ALBUMIN SERPL ELPH-MCNC: 4.5 G/DL
ALP BLD-CCNC: 73 U/L
ALT SERPL-CCNC: 18 U/L
ANION GAP SERPL CALC-SCNC: 10 MMOL/L
AST SERPL-CCNC: 21 U/L
BILIRUB SERPL-MCNC: 0.5 MG/DL
BUN SERPL-MCNC: 19 MG/DL
CALCIUM SERPL-MCNC: 9.7 MG/DL
CHLORIDE SERPL-SCNC: 103 MMOL/L
CO2 SERPL-SCNC: 24 MMOL/L
CREAT SERPL-MCNC: 1.1 MG/DL
GLUCOSE SERPL-MCNC: 100 MG/DL
LDH SERPL-CCNC: 180 U/L
MAGNESIUM SERPL-MCNC: 2.3 MG/DL
PHOSPHATE SERPL-MCNC: 3.3 MG/DL
POTASSIUM SERPL-SCNC: 4.5 MMOL/L
PROT SERPL-MCNC: 7.3 G/DL
SODIUM SERPL-SCNC: 137 MMOL/L
URATE SERPL-MCNC: 5.8 MG/DL

## 2020-12-15 NOTE — ASSESSMENT
[FreeTextEntry1] : Mr. Don is a 60 year old male with CLL (diagnosed 2014) on [id number] study who presents for follow-up. He is agreeable to continue his participation in the study. \par \par Plan:\par 1)CLL:\par -CBC reviewed, counts overall stable. He is clinically stable as well. Patient underwent a BMBx on 10/1/20 which showed that he is currently in CR. Previous imaging has also shown improvement radiographically with the size of his lymph nodes. Pt advised to call if he develops any concerning symptoms.\par \par RTO as per protocol.

## 2020-12-15 NOTE — HISTORY OF PRESENT ILLNESS
[Disease:__________________________] : Disease: [unfilled] [de-identified] : Mr. Don is a 60 year old male with CLL (diagnosed 2014) on [id number] study who presents for follow-up. He denies any new fevers, chills, or night sweats. He reports no sick contacts. Denies productive cough, burning sensation during urination, or abdominal pain. Appetite is good. Weight stable. No chest pain or shortness of breath. No palpitations. Energy stable. Since his last visit, pt was started on doxycycline in 10/2020 due to a concern for lyme disease; however, a full course was not completed as a western blot returned negative. Pt also completed a course of keflex in 11/20 for a toe infection. He reports that he has a follow-up scheduled with Podiatry next week. Pt had a BMBx on 10/1/20 which showed that patient is currently in CR.\par  [de-identified] : CLL Markers:\par CD38+, ZAP70+\par unmutated IGHV\par TP53 mutation

## 2020-12-15 NOTE — REVIEW OF SYSTEMS
Discharge teaching and instructions for diagnosis/procedure of VATS completed with patient using teachback method. AVS reviewed. Printed prescriptions given to patient. Patient voiced understanding regarding prescriptions, follow up appointments, and care of self at home.  Discharged ambulatory and in a wheelchair to  home with support per daughter [Negative] : Allergic/Immunologic [Fever] : no fever [Chills] : no chills [Night Sweats] : no night sweats [Fatigue] : no fatigue [Recent Change In Weight] : ~T no recent weight change [Vision Problems] : no vision problems [Dysphagia] : no dysphagia [Chest Pain] : no chest pain [Palpitations] : no palpitations [Shortness Of Breath] : no shortness of breath [Wheezing] : no wheezing [Cough] : no cough [SOB on Exertion] : no shortness of breath during exertion [Abdominal Pain] : no abdominal pain [Vomiting] : no vomiting [Constipation] : no constipation [Dysuria] : no dysuria [Incontinence] : no incontinence [Joint Pain] : no joint pain [Muscle Pain] : no muscle pain [Skin Rash] : no skin rash [Dizziness] : no dizziness [Insomnia] : no insomnia [Easy Bleeding] : no tendency for easy bleeding [Easy Bruising] : no tendency for easy bruising

## 2020-12-16 ENCOUNTER — NON-APPOINTMENT (OUTPATIENT)
Age: 60
End: 2020-12-16

## 2021-02-22 ENCOUNTER — RX RENEWAL (OUTPATIENT)
Age: 61
End: 2021-02-22

## 2021-02-23 ENCOUNTER — RX RENEWAL (OUTPATIENT)
Age: 61
End: 2021-02-23

## 2021-02-24 ENCOUNTER — OUTPATIENT (OUTPATIENT)
Dept: OUTPATIENT SERVICES | Facility: HOSPITAL | Age: 61
LOS: 1 days | End: 2021-02-24
Payer: SUBSIDIZED

## 2021-02-24 ENCOUNTER — RESULT REVIEW (OUTPATIENT)
Age: 61
End: 2021-02-24

## 2021-02-24 ENCOUNTER — APPOINTMENT (OUTPATIENT)
Dept: HEMATOLOGY ONCOLOGY | Facility: CLINIC | Age: 61
End: 2021-02-24
Payer: COMMERCIAL

## 2021-02-24 ENCOUNTER — APPOINTMENT (OUTPATIENT)
Dept: CT IMAGING | Facility: IMAGING CENTER | Age: 61
End: 2021-02-24
Payer: SUBSIDIZED

## 2021-02-24 VITALS
BODY MASS INDEX: 30.41 KG/M2 | SYSTOLIC BLOOD PRESSURE: 130 MMHG | WEIGHT: 200 LBS | HEART RATE: 64 BPM | DIASTOLIC BLOOD PRESSURE: 90 MMHG | RESPIRATION RATE: 14 BRPM | OXYGEN SATURATION: 98 % | TEMPERATURE: 97.9 F

## 2021-02-24 DIAGNOSIS — Z00.8 ENCOUNTER FOR OTHER GENERAL EXAMINATION: ICD-10-CM

## 2021-02-24 DIAGNOSIS — Z98.890 OTHER SPECIFIED POSTPROCEDURAL STATES: Chronic | ICD-10-CM

## 2021-02-24 LAB — SARS-COV-2 N GENE NPH QL NAA+PROBE: NOT DETECTED

## 2021-02-24 PROCEDURE — 70491 CT SOFT TISSUE NECK W/DYE: CPT | Mod: 26

## 2021-02-24 PROCEDURE — 99215 OFFICE O/P EST HI 40 MIN: CPT

## 2021-02-24 PROCEDURE — 74177 CT ABD & PELVIS W/CONTRAST: CPT | Mod: 26

## 2021-02-24 PROCEDURE — 71260 CT THORAX DX C+: CPT

## 2021-02-24 PROCEDURE — 71260 CT THORAX DX C+: CPT | Mod: 26

## 2021-02-24 PROCEDURE — 82565 ASSAY OF CREATININE: CPT

## 2021-02-24 PROCEDURE — 99072 ADDL SUPL MATRL&STAF TM PHE: CPT

## 2021-02-24 PROCEDURE — 70491 CT SOFT TISSUE NECK W/DYE: CPT

## 2021-02-24 PROCEDURE — 74177 CT ABD & PELVIS W/CONTRAST: CPT

## 2021-02-24 RX ORDER — DOXYCYCLINE HYCLATE 100 MG/1
100 CAPSULE ORAL
Qty: 42 | Refills: 0 | Status: DISCONTINUED | COMMUNITY
Start: 2020-10-07 | End: 2020-10-08

## 2021-02-26 LAB
ALBUMIN SERPL ELPH-MCNC: 4.4 G/DL
ALP BLD-CCNC: 67 U/L
ALT SERPL-CCNC: 21 U/L
ANION GAP SERPL CALC-SCNC: 14 MMOL/L
AST SERPL-CCNC: 21 U/L
BILIRUB SERPL-MCNC: 0.5 MG/DL
BUN SERPL-MCNC: 18 MG/DL
CALCIUM SERPL-MCNC: 9.1 MG/DL
CHLORIDE SERPL-SCNC: 99 MMOL/L
CO2 SERPL-SCNC: 21 MMOL/L
CREAT SERPL-MCNC: 1.29 MG/DL
GLUCOSE SERPL-MCNC: 81 MG/DL
POTASSIUM SERPL-SCNC: 4.1 MMOL/L
PROT SERPL-MCNC: 7 G/DL
SODIUM SERPL-SCNC: 134 MMOL/L

## 2021-02-26 NOTE — HISTORY OF PRESENT ILLNESS
[Disease:__________________________] : Disease: [unfilled] [de-identified] : Mr. Don is a 61 year old male with CLL (diagnosed 2014) on [id number] study who presents for follow-up. \par He denies any new fevers, chills, or night sweats. Appetite is good. Weight stable. No chest pain or shortness of breath. No palpitations. Energy stable. He has received 2 doses of the COVID vaccine (moderna), he had a fever post second dose and it resolved promptly. [de-identified] : CLL Markers:\par CD38+, ZAP70+\par unmutated IGHV\par TP53 mutation

## 2021-02-26 NOTE — ASSESSMENT
[FreeTextEntry1] : Mr. Don is a 61 year old male with CLL (diagnosed 2014) on [id number] study who presents for follow-up. He is agreeable to continue his participation in the study and signed an updated consent.\par \par Plan:\par 1)CLL:\par -CBC reviewed, counts overall stable. He is clinically stable as well. Patient underwent a BMBx on 10/1/20 which showed that he is currently in CR. Previous imaging has also shown improvement radiographically with the size of his lymph nodes. Pt advised to call if he develops any concerning symptoms.\par \par RTO as per protocol.

## 2021-02-26 NOTE — REVIEW OF SYSTEMS
[Negative] : Allergic/Immunologic [Fever] : no fever [Chills] : no chills [Night Sweats] : no night sweats [Fatigue] : no fatigue [Recent Change In Weight] : ~T no recent weight change [Vision Problems] : no vision problems [Dysphagia] : no dysphagia [Chest Pain] : no chest pain [Palpitations] : no palpitations [Shortness Of Breath] : no shortness of breath [Wheezing] : no wheezing [Cough] : no cough [SOB on Exertion] : no shortness of breath during exertion [Abdominal Pain] : no abdominal pain [Vomiting] : no vomiting [Constipation] : no constipation [Dysuria] : no dysuria [Incontinence] : no incontinence [Joint Pain] : no joint pain [Muscle Pain] : no muscle pain [Skin Rash] : no skin rash [Dizziness] : no dizziness [Insomnia] : no insomnia [Easy Bleeding] : no tendency for easy bleeding [Easy Bruising] : no tendency for easy bruising

## 2021-02-27 LAB
LDH SERPL-CCNC: 192 U/L
MAGNESIUM SERPL-MCNC: 2.2 MG/DL
PHOSPHATE SERPL-MCNC: 3.4 MG/DL
URATE SERPL-MCNC: 5.3 MG/DL

## 2021-04-06 NOTE — ED ADULT NURSE REASSESSMENT NOTE - INTEGUMENTARY WDL
Color consistent with ethnicity/race, warm, dry intact, resilient.
Color consistent with ethnicity/race, warm, dry intact, resilient.
done

## 2021-04-29 ENCOUNTER — NON-APPOINTMENT (OUTPATIENT)
Age: 61
End: 2021-04-29

## 2021-04-29 ENCOUNTER — APPOINTMENT (OUTPATIENT)
Dept: FAMILY MEDICINE | Facility: CLINIC | Age: 61
End: 2021-04-29
Payer: COMMERCIAL

## 2021-04-29 VITALS
HEIGHT: 68 IN | BODY MASS INDEX: 30.46 KG/M2 | DIASTOLIC BLOOD PRESSURE: 82 MMHG | RESPIRATION RATE: 16 BRPM | TEMPERATURE: 98.3 F | WEIGHT: 201 LBS | HEART RATE: 79 BPM | SYSTOLIC BLOOD PRESSURE: 142 MMHG | OXYGEN SATURATION: 99 %

## 2021-04-29 DIAGNOSIS — Z11.59 ENCOUNTER FOR SCREENING FOR OTHER VIRAL DISEASES: ICD-10-CM

## 2021-04-29 DIAGNOSIS — R42 DIZZINESS AND GIDDINESS: ICD-10-CM

## 2021-04-29 DIAGNOSIS — Z87.898 PERSONAL HISTORY OF OTHER SPECIFIED CONDITIONS: ICD-10-CM

## 2021-04-29 DIAGNOSIS — H93.8X1 OTHER SPECIFIED DISORDERS OF RIGHT EAR: ICD-10-CM

## 2021-04-29 PROCEDURE — 00009S: CUSTOM

## 2021-04-29 PROCEDURE — 00001S: CUSTOM

## 2021-04-29 PROCEDURE — 00006S TB TEST: CUSTOM

## 2021-04-29 PROCEDURE — 00012S: CUSTOM

## 2021-04-29 PROCEDURE — 00011S: CUSTOM

## 2021-04-30 DIAGNOSIS — Z23 ENCOUNTER FOR IMMUNIZATION: ICD-10-CM

## 2021-05-18 ENCOUNTER — APPOINTMENT (OUTPATIENT)
Dept: HEMATOLOGY ONCOLOGY | Facility: CLINIC | Age: 61
End: 2021-05-18
Payer: COMMERCIAL

## 2021-05-18 VITALS
SYSTOLIC BLOOD PRESSURE: 130 MMHG | TEMPERATURE: 98.4 F | HEART RATE: 66 BPM | OXYGEN SATURATION: 99 % | RESPIRATION RATE: 16 BRPM | DIASTOLIC BLOOD PRESSURE: 80 MMHG | WEIGHT: 202 LBS | BODY MASS INDEX: 30.71 KG/M2

## 2021-05-18 PROCEDURE — 99215 OFFICE O/P EST HI 40 MIN: CPT

## 2021-05-18 PROCEDURE — 99072 ADDL SUPL MATRL&STAF TM PHE: CPT

## 2021-05-24 NOTE — ED PROVIDER NOTE - MDM ORDERS SUBMITTED SELECTION
[facility] Division of Hospital Medicine    Chief Complaint:  CHF exacerbation    SUBJECTIVE: reports no new complains, reports feeling better     OVERNIGHT EVENTS: none reported     Patient denies chest pain, SOB, abd pain, N/V, fever, chills, dysuria or any other complaints. All remainder ROS negative.     MEDICATIONS  (STANDING):  aspirin  chewable 81 milliGRAM(s) Oral daily  atorvastatin 10 milliGRAM(s) Oral at bedtime  cholecalciferol 2000 Unit(s) Oral daily  dextrose 40% Gel 15 Gram(s) Oral once  dextrose 5%. 1000 milliLiter(s) (50 mL/Hr) IV Continuous <Continuous>  dextrose 5%. 1000 milliLiter(s) (100 mL/Hr) IV Continuous <Continuous>  dextrose 50% Injectable 25 Gram(s) IV Push once  dextrose 50% Injectable 12.5 Gram(s) IV Push once  dextrose 50% Injectable 25 Gram(s) IV Push once  doxazosin 4 milliGRAM(s) Oral at bedtime  ferrous    sulfate 325 milliGRAM(s) Oral daily  glucagon  Injectable 1 milliGRAM(s) IntraMuscular once  heparin   Injectable 5000 Unit(s) SubCutaneous every 8 hours  hydrALAZINE 50 milliGRAM(s) Oral three times a day  insulin lispro (ADMELOG) corrective regimen sliding scale   SubCutaneous three times a day before meals  insulin lispro (ADMELOG) corrective regimen sliding scale   SubCutaneous at bedtime  isosorbide   mononitrate ER Tablet (IMDUR) 30 milliGRAM(s) Oral daily  levothyroxine 50 MICROGram(s) Oral daily  memantine 10 milliGRAM(s) Oral two times a day  metoprolol succinate ER 25 milliGRAM(s) Oral daily  sodium bicarbonate 650 milliGRAM(s) Oral two times a day  torsemide 20 milliGRAM(s) Oral two times a day    MEDICATIONS  (PRN):  ondansetron Injectable 4 milliGRAM(s) IV Push every 6 hours PRN Nausea  senna 2 Tablet(s) Oral at bedtime PRN Constipation        I&O's Summary    23 May 2021 07:01  -  24 May 2021 07:00  --------------------------------------------------------  IN: 0 mL / OUT: 1440 mL / NET: -1440 mL        PHYSICAL EXAM:  Vital Signs Last 24 Hrs  T(C): 36.7 (24 May 2021 13:09), Max: 37 (23 May 2021 21:56)  T(F): 98 (24 May 2021 13:09), Max: 98.6 (23 May 2021 21:56)  HR: 72 (24 May 2021 13:09) (66 - 88)  BP: 142/70 (24 May 2021 13:09) (110/53 - 142/84)  BP(mean): --  RR: 18 (24 May 2021 13:09) (18 - 18)  SpO2: 98% (24 May 2021 13:09) (96% - 100%)      CONSTITUTIONAL: NAD, well-developed, well-groomed  ENMT: Moist oral mucosa, no pharyngeal injection or exudates; normal dentition; No JVD  RESPIRATORY: Normal respiratory effort;  crackles on bases, R side chest tube in place. no wheezing  CARDIOVASCULAR: Regular rate and rhythm, regular s1s2,  systolic murmur in RUSB 3/6, no delay in carotid upstroke, No lower extremity edema; Peripheral pulses are 2+ bilaterally  ABDOMEN: Nontender to palpation, normoactive bowel sounds, no rebound/guarding; No hepatosplenomegaly  MUSCLOSKELETAL:  no clubbing or cyanosis of digits; no joint swelling or tenderness to palpation  PSYCH: A+O to person, place, but not time; affect appropriate  NEUROLOGY: CN 2-12 are intact and symmetric; no gross sensory deficits; was observed moving all 4 ext against gravity cooperating with exam.   SKIN: No rashes; no palpable lesions    LABS:                        9.9    7.13  )-----------( 165      ( 24 May 2021 10:11 )             29.6     05-24    130<L>  |  91<L>  |  66.0<H>  ----------------------------<  154<H>  4.4   |  29.0  |  4.17<H>    Ca    8.2<L>      24 May 2021 10:11  Phos  4.0     05-24  Mg     2.1     05-23    TPro  6.1<L>  /  Alb  2.8<L>  /  TBili  0.4  /  DBili  x   /  AST  11  /  ALT  6   /  AlkPhos  56  05-22              CAPILLARY BLOOD GLUCOSE      POCT Blood Glucose.: 133 mg/dL (24 May 2021 07:53)  POCT Blood Glucose.: 133 mg/dL (23 May 2021 21:56)  POCT Blood Glucose.: 159 mg/dL (23 May 2021 16:09)        RADIOLOGY & ADDITIONAL TESTS:  Results Reviewed:   Imaging Personally Reviewed:  Electrocardiogram Personally Reviewed: Imaging Studies/Labs/EKG/Medications

## 2021-05-27 NOTE — ED ADULT NURSE REASSESSMENT NOTE - NIH STROKE SCALE: 5B. MOTOR ARM, RIGHT, QM
Unable to Assess
(0) No drift; limb holds 90 (or 45) degrees for full 10 secs

## 2021-05-31 LAB
ALBUMIN SERPL ELPH-MCNC: 4.4 G/DL
ALP BLD-CCNC: 86 U/L
ALT SERPL-CCNC: 16 U/L
ANION GAP SERPL CALC-SCNC: 12 MMOL/L
AST SERPL-CCNC: 18 U/L
BILIRUB SERPL-MCNC: 0.3 MG/DL
BUN SERPL-MCNC: 18 MG/DL
CALCIUM SERPL-MCNC: 9.4 MG/DL
CHLORIDE SERPL-SCNC: 105 MMOL/L
CO2 SERPL-SCNC: 22 MMOL/L
CREAT SERPL-MCNC: 1.1 MG/DL
GLUCOSE SERPL-MCNC: 94 MG/DL
LDH SERPL-CCNC: 181 U/L
MAGNESIUM SERPL-MCNC: 2.4 MG/DL
PHOSPHATE SERPL-MCNC: 3 MG/DL
POTASSIUM SERPL-SCNC: 4.6 MMOL/L
PROT SERPL-MCNC: 7.2 G/DL
SODIUM SERPL-SCNC: 139 MMOL/L
URATE SERPL-MCNC: 5.3 MG/DL

## 2021-06-22 NOTE — ASSESSMENT
[FreeTextEntry1] : Mr. Don is a 61 year old male with CLL (diagnosed 2014) on [id number] study who presents for follow-up. No new complaints. No evidence of disease progression at this time.\par \par Plan:\par 1)CLL:\par -CBC reviewed, counts overall stable. He is clinically stable as well. Patient underwent a BMBx on 10/1/20 which showed that he is currently in CR. CT from 2/21 shows stable LN. Pt advised to call if he develops any concerning symptoms.\par \par RTO as per protocol, sooner PRN.

## 2021-06-22 NOTE — HISTORY OF PRESENT ILLNESS
[de-identified] : Mr. Don is a 61 year old male with CLL (diagnosed 2014) on [id number] study who presents for follow-up. \par He denies any new fevers, chills, or night sweats. Appetite is good. Weight stable. No chest pain or shortness of breath. No palpitations. Energy stable. ROS neg.\par He received 2 doses of the COVID vaccine (moderna). [de-identified] : CLL [de-identified] : CD38+, ZAP70+\par unmutated IGHV\par TP53 mutation. \par

## 2021-06-23 ENCOUNTER — APPOINTMENT (OUTPATIENT)
Dept: INTERNAL MEDICINE | Facility: CLINIC | Age: 61
End: 2021-06-23
Payer: COMMERCIAL

## 2021-06-23 ENCOUNTER — LABORATORY RESULT (OUTPATIENT)
Age: 61
End: 2021-06-23

## 2021-06-23 VITALS
HEART RATE: 60 BPM | TEMPERATURE: 98.4 F | WEIGHT: 200 LBS | SYSTOLIC BLOOD PRESSURE: 126 MMHG | HEIGHT: 68 IN | BODY MASS INDEX: 30.31 KG/M2 | DIASTOLIC BLOOD PRESSURE: 77 MMHG

## 2021-06-23 DIAGNOSIS — M79.646 PAIN IN UNSPECIFIED FINGER(S): ICD-10-CM

## 2021-06-23 PROCEDURE — 36415 COLL VENOUS BLD VENIPUNCTURE: CPT

## 2021-06-23 PROCEDURE — 99213 OFFICE O/P EST LOW 20 MIN: CPT | Mod: 25

## 2021-06-23 PROCEDURE — 99072 ADDL SUPL MATRL&STAF TM PHE: CPT

## 2021-06-23 NOTE — HISTORY OF PRESENT ILLNESS
[FreeTextEntry8] : 61 year old male here today with Finger pain for 3 days. \par Denies any other symptoms. Did not injur it. \par \par He otherwise feels great. \par He is currently in a clinical trial. \par \par \par

## 2021-06-23 NOTE — ASSESSMENT
[FreeTextEntry1] : will draw labs\par possible xray or send to [facility] Dr Pritchett \par \par will fu

## 2021-06-23 NOTE — PHYSICAL EXAM
[Normal] : affect was normal and insight and judgment were intact [de-identified] : slightly swollen middle finger

## 2021-06-25 LAB
25(OH)D3 SERPL-MCNC: 30.1 NG/ML
ALBUMIN SERPL ELPH-MCNC: 4.2 G/DL
ALP BLD-CCNC: 95 U/L
ALT SERPL-CCNC: 25 U/L
ANION GAP SERPL CALC-SCNC: 12 MMOL/L
AST SERPL-CCNC: 26 U/L
BASOPHILS # BLD AUTO: 0.08 K/UL
BASOPHILS NFR BLD AUTO: 1.1 %
BILIRUB SERPL-MCNC: 0.4 MG/DL
BUN SERPL-MCNC: 16 MG/DL
CALCIUM SERPL-MCNC: 9.2 MG/DL
CHLORIDE SERPL-SCNC: 102 MMOL/L
CHOLEST SERPL-MCNC: 184 MG/DL
CO2 SERPL-SCNC: 22 MMOL/L
CREAT SERPL-MCNC: 1.23 MG/DL
EOSINOPHIL # BLD AUTO: 0.18 K/UL
EOSINOPHIL NFR BLD AUTO: 2.4 %
ESTIMATED AVERAGE GLUCOSE: 123 MG/DL
GLUCOSE SERPL-MCNC: 103 MG/DL
HBA1C MFR BLD HPLC: 5.9 %
HCT VFR BLD CALC: 42.8 %
HDLC SERPL-MCNC: 46 MG/DL
HGB BLD-MCNC: 13.9 G/DL
IMM GRANULOCYTES NFR BLD AUTO: 0.8 %
LDLC SERPL CALC-MCNC: 112 MG/DL
LYMPHOCYTES # BLD AUTO: 1.84 K/UL
LYMPHOCYTES NFR BLD AUTO: 24.7 %
MAN DIFF?: NORMAL
MCHC RBC-ENTMCNC: 28.6 PG
MCHC RBC-ENTMCNC: 32.5 GM/DL
MCV RBC AUTO: 88.1 FL
MONOCYTES # BLD AUTO: 0.62 K/UL
MONOCYTES NFR BLD AUTO: 8.3 %
NEUTROPHILS # BLD AUTO: 4.66 K/UL
NEUTROPHILS NFR BLD AUTO: 62.7 %
NONHDLC SERPL-MCNC: 138 MG/DL
PLATELET # BLD AUTO: 146 K/UL
POTASSIUM SERPL-SCNC: 4.1 MMOL/L
PROT SERPL-MCNC: 7.1 G/DL
RBC # BLD: 4.86 M/UL
RBC # FLD: 14.1 %
SODIUM SERPL-SCNC: 136 MMOL/L
T4 SERPL-MCNC: 7.1 UG/DL
TRIGL SERPL-MCNC: 127 MG/DL
TSH SERPL-ACNC: 4.34 UIU/ML
URATE SERPL-MCNC: 5.5 MG/DL
WBC # FLD AUTO: 7.44 K/UL

## 2021-08-11 ENCOUNTER — APPOINTMENT (OUTPATIENT)
Dept: HEMATOLOGY ONCOLOGY | Facility: CLINIC | Age: 61
End: 2021-08-11
Payer: COMMERCIAL

## 2021-08-11 VITALS
OXYGEN SATURATION: 99 % | RESPIRATION RATE: 15 BRPM | WEIGHT: 207 LBS | SYSTOLIC BLOOD PRESSURE: 140 MMHG | DIASTOLIC BLOOD PRESSURE: 80 MMHG | BODY MASS INDEX: 31.47 KG/M2 | TEMPERATURE: 98.4 F | HEART RATE: 62 BPM

## 2021-08-11 PROCEDURE — 99215 OFFICE O/P EST HI 40 MIN: CPT

## 2021-08-11 NOTE — ASSESSMENT
[FreeTextEntry1] : Mr. Don is a 61 year old male with CLL (diagnosed 2014) on [id number] study who presents for follow-up. No new complaints. No evidence of disease progression at this time.\par \par Plan:\par 1)CLL:\par -CBC reviewed, counts overall stable. He is clinically stable as well. Patient underwent a BMBx on 10/1/20 which showed that he is currently in CR. CT from 2/21 shows stable LN. Pt advised to call if he develops any concerning symptoms.\par \par RTO as per protocol, sooner PRN.

## 2021-08-11 NOTE — HISTORY OF PRESENT ILLNESS
[de-identified] : Mr. Don is a 61 year old male with CLL (diagnosed 2014) on [id number]-FH study who presents for follow-up. \par He denies any new fevers, chills, or night sweats. Appetite is good. Weight stable. No chest pain or shortness of breath. No palpitations. Energy stable. ROS neg.\par He received 2 doses of the COVID vaccine (moderna). [de-identified] : CLL [de-identified] : CD38+, ZAP70+\par unmutated IGHV\par TP53 mutation. \par

## 2021-08-13 LAB
ALBUMIN SERPL ELPH-MCNC: 4.4 G/DL
ALP BLD-CCNC: 77 U/L
ALT SERPL-CCNC: 20 U/L
ANION GAP SERPL CALC-SCNC: 13 MMOL/L
AST SERPL-CCNC: 21 U/L
BILIRUB SERPL-MCNC: 0.5 MG/DL
BUN SERPL-MCNC: 18 MG/DL
CALCIUM SERPL-MCNC: 9.6 MG/DL
CHLORIDE SERPL-SCNC: 101 MMOL/L
CO2 SERPL-SCNC: 22 MMOL/L
COVID-19 SPIKE DOMAIN ANTIBODY INTERPRETATION: POSITIVE
CREAT SERPL-MCNC: 1.23 MG/DL
GLUCOSE SERPL-MCNC: 107 MG/DL
LDH SERPL-CCNC: 171 U/L
MAGNESIUM SERPL-MCNC: 2.3 MG/DL
PHOSPHATE SERPL-MCNC: 3.2 MG/DL
POTASSIUM SERPL-SCNC: 4.2 MMOL/L
PROT SERPL-MCNC: 7 G/DL
SARS-COV-2 AB SERPL IA-ACNC: 2.23 U/ML
SODIUM SERPL-SCNC: 137 MMOL/L
URATE SERPL-MCNC: 6 MG/DL

## 2021-08-25 NOTE — ED ADULT NURSE REASSESSMENT NOTE - NIH STROKE SCALE: 1A. LEVEL OF CONSCIOUSNESS, QM
Yaz Jeffery is a 36 y.o. female presenting today for Diabetes (follow-up) and Annual Wellness Visit  . Chief Complaint   Patient presents with    Diabetes     follow-up    Annual Wellness Visit       HPI:  Yaz Jeffery presents to the office today for annual wellness routine follow-up care. She carries a medical diagnosis for cerebral palsy, diabetes, hypertension and GERD follow-up care. Patient is now oxygen dependent and is now on O2 at 2 L while sitting in 4 L of walking. She ambulates with bilateral braces. She was recently diagnosed with sarcoidosis and is managed by pulmonary. Patient presents today complaining of intermittent dyspnea. She also presents today with elevated BP reading of 160/100. She carries a medical diagnosis for steroid-induced diabetes. Patient remains on prednisone reports that blood glucose readings have been elevated. ROS    ROS:  History obtained from the patient intake forms which are reviewed with the patient  · General: negative for - chills, fever, weight changes or malaise  · HEENT: no sore throat, nasal congestion, vision problems or ear problems  · Respiratory: no cough, shortness of breath, or wheezing  · Cardiovascular: no chest pain, palpitations, or dyspnea on exertion  · Gastrointestinal: no abdominal pain, N/V, change in bowel habits, or black or bloody stools  · Musculoskeletal: no back pain, joint pain, joint stiffness, muscle pain or muscle weakness  · Neurological: no numbness, tingling, headache or dizziness  · Endo:  No polyuria or polydipsia. · : no hematuria, dysuria, frequency, hesitancy, or nocturia.     · Psychological: negative for - anxiety, depression, sleep disturbances, suicidal or homicidal ideations    No Known Allergies    PHQ Screening   3 most recent PHQ Screens 5/14/2021   Little interest or pleasure in doing things Not at all   Feeling down, depressed, irritable, or hopeless Not at all   Total Score PHQ 2 0
History  Past Medical History:   Diagnosis Date    CP (cerebral palsy) (HCC)     lower extremities    Diabetes (HCC)     Enlarged submental lymph node     GERD (gastroesophageal reflux disease)     Lymphedema of lower extremity     Oxygen dependent        Past Surgical History:   Procedure Laterality Date    HX OTHER SURGICAL      heel and abductor releases to both hips as youth       Social History     Socioeconomic History    Marital status:      Spouse name: Not on file    Number of children: Not on file    Years of education: Not on file    Highest education level: Not on file   Occupational History    Not on file   Tobacco Use    Smoking status: Never Smoker    Smokeless tobacco: Never Used   Substance and Sexual Activity    Alcohol use: No    Drug use: No    Sexual activity: Yes     Partners: Male     Birth control/protection: None   Other Topics Concern    Not on file   Social History Narrative    Not on file     Social Determinants of Health     Financial Resource Strain:     Difficulty of Paying Living Expenses:    Food Insecurity:     Worried About Running Out of Food in the Last Year:     Ran Out of Food in the Last Year:    Transportation Needs:     Lack of Transportation (Medical):      Lack of Transportation (Non-Medical):    Physical Activity:     Days of Exercise per Week:     Minutes of Exercise per Session:    Stress:     Feeling of Stress :    Social Connections:     Frequency of Communication with Friends and Family:     Frequency of Social Gatherings with Friends and Family:     Attends Mu-ism Services:     Active Member of Clubs or Organizations:     Attends Club or Organization Meetings:     Marital Status:    Intimate Partner Violence:     Fear of Current or Ex-Partner:     Emotionally Abused:     Physically Abused:     Sexually Abused:        Current Outpatient Medications   Medication Sig Dispense Refill    metFORMIN (GLUCOPHAGE) 1,000 mg
tablet Take 1 Tablet by mouth two (2) times daily (with meals). 30 Tablet 0    Wheel Chair anton Unable to walk without dyspnea. O2 dependent 1 Each 0    cetirizine (ZyrTEC) 10 mg tablet Take  by mouth.  mometasone (NASONEX) 50 mcg/actuation nasal spray 2 Sprays by Both Nostrils route daily.  OXYGEN-AIR DELIVERY SYSTEMS 2 L by Nasal route as needed for Other (shortness of breath).  albuterol (PROVENTIL HFA, VENTOLIN HFA, PROAIR HFA) 90 mcg/actuation inhaler Take 2 Puffs by inhalation every four (4) hours as needed for Shortness of Breath or Wheezing.  omeprazole (PRILOSEC) 20 mg capsule Take 20 mg by mouth two (2) times a day.  acetaminophen (TylenoL) 325 mg tablet Take 325 mg by mouth every four (4) hours as needed for Pain.  SITagliptin (JANUVIA) 25 mg tablet Take 1 Tablet by mouth daily. 90 Tablet 0    glimepiride (AMARYL) 1 mg tablet Take 1 Tablet by mouth Daily (before breakfast). 90 Tablet 0    metoprolol succinate (TOPROL-XL) 50 mg XL tablet TAKE 1 TABLET BY MOUTH DAILY 90 Tablet 0         Vitals:    08/25/21 1553 08/25/21 1637   BP: (!) 169/106 (!) 160/100   Pulse: (!) 105    Resp: 16    Temp: 98.2 °F (36.8 °C)    TempSrc: Oral    SpO2: 99%    Weight: 239 lb (108.4 kg)    Height: 5' 2\" (1.575 m)    PainSc:   0 - No pain    LMP: 08/02/2021       Physical Exam  Vitals and nursing note reviewed. Constitutional:       Appearance: Normal appearance. HENT:      Head: Normocephalic. Cardiovascular:      Rate and Rhythm: Normal rate and regular rhythm. Pulses: Normal pulses. Heart sounds: Normal heart sounds. Pulmonary:      Effort: Respiratory distress (mild with ambulation) present. Breath sounds: No wheezing. Skin:     General: Skin is warm and dry. Neurological:      Mental Status: She is alert. Mental status is at baseline.          Office Visit on 08/25/2021   Component Date Value Ref Range Status    Hemoglobin A1c (POC) 08/25/2021 5.8  % Final
Admission on 08/10/2021, Discharged on 08/10/2021   Component Date Value Ref Range Status    Glucose (POC) 08/10/2021 77  65 - 105 mg/dL Final    : [id number]    Glucose (POC) 08/10/2021 84  65 - 105 mg/dL Final    Comment: Notified Nurse  : [id number]      Acid Fast Stain: 08/10/2021     Final                    Value:No Acid Fast Bacilli Seen On Concentrated Smear by Fluorochrome stain  Testing performed by [facility]      Acid Fast Stain: 08/10/2021     Final                    Value:No Acid Fast Bacilli Seen On Concentrated Smear by Fluorochrome stain  Testing performed by [address] [place] [address] Laboratory      GRAM STAIN 08/10/2021 *   Final                    Value: Moderate WBC'S  Rare Gram Negative Bacilli      Isolate 08/10/2021 *   Final                    Value: Many  Staphylococcus aureus      GRAM STAIN 08/10/2021     Final                    Value:Few WBC'S  No Organisms Seen      Isolate 08/10/2021 *   Final                    Value:Rare  Staphylococcus aureus  See Previous Culture For Susceptibility Report      Fungal stain 08/10/2021 No Fungal Elements Seen    Final    Isolate 08/10/2021 *   Incomplete                    Value:Rare  Mold Isolated  Sent to reference lab for further identification      Fungal stain 08/10/2021 No Fungal Elements Seen    Final    Culture Result 08/10/2021 No Fungus Isolated At 3 Weeks    Incomplete    Clinical information 08/10/2021 NOT PROVIDED    Final    Specimen type 08/10/2021 SEE NOTE    Final    BAL RML    Viability 08/10/2021 74  % Final    Sample description 08/10/2021 SEE NOTE    Final    The sample consists of small and medium-sized lymphocytes.  Gating strategy 08/10/2021 SEE NOTE    Final    Comment: Lymphocytes were selected for analysis based on CD45 staining  intensity, forward scatter and side scatter.       Markers 08/10/2021 SEE NOTE    Final    Comment:    Hope Hull B - Lymphocytes  Marker    Percentage  CD2
95  CD3          93  CD4          87  CD5          92  CD7          69  CD8          6  CD10         0  CD11c        8  CD13         0  CD19         1  CD19+CD5+    0  CD20         1  CD23         0  CD33         0  CD34         0  CD38         51  CD45         100  CD56+CD3-    3  CD64         1          0  HLA_DR       31  Kappa CD19+  0  Lambda CD19+ 1  K/L Ratio    0.00     [place]reston - B Addl Markers  Marker    Percentage          10  CD25         35  This test was developed and its analytical performance  characteristics have been determined by [facility], [place]. It has  not been cleared or approved by the U.S. Food and Drug  Administration. This assay has been validated pursuant  to the CLIA regulations and is used for clinical  purposes.  Number of markers 08/10/2021 24    Final    Comment: Test Performed by Kirt Grier,  [facility],  [facility], [address] [place], [place] [address]patric Diaz M.D., Ph.D., Director of Laboratories  [phone], CLIA [id number]      INTERPRETATION 08/10/2021 SEE NOTE    Final    Comment: **CD4+ T-CELL-PREDOMINANT SAMPLE (SEE COMMENT)**  The sample is predominantly composed of lymphocytes which include an  increased proportion of CD4+ T cells including 27% CD4+CD7- T cells,  32% CD4++ T cells and 52% CD4+- T cells. Other lymphocytes include fewer CD8+ T cells and polyclonal B cells. COMMENT: Correlation with morphologic and clinical findings is  advised. Flow Cytometry reviewed by Juan M Rothman M.D.      Hospital Outpatient Visit on 08/06/2021   Component Date Value Ref Range Status    WBC 08/06/2021 3.9* 4.0 - 11.0 1000/mm3 Final    RBC 08/06/2021 4.36  3.60 - 5.20 M/uL Final    HGB 08/06/2021 11.1* 13.0 - 17.2 gm/dl Final    HCT 08/06/2021 34.0* 37.0 - 50.0 % Final    MCV 08/06/2021 78.0* 80.0 - 98.0 fL Final    MCH 08/06/2021 25.5  25.4 - 34.6 pg Final    MCHC 08/06/2021 32.6  30.0
- 36.0 gm/dl Final    PLATELET 69/32/0375 194  140 - 450 1000/mm3 Final    MPV 08/06/2021 9.2  6.0 - 10.0 fL Final    RDW-SD 08/06/2021 51.8* 36.4 - 46.3   Final    NRBC 08/06/2021 0  0 - 0   Final    IMMATURE GRANULOCYTES 08/06/2021 0.8  0.0 - 3.0 % Final    Comment: IG - Immature granulocytes (promyelocytes + myelocytes + metamyelocytes), their presence  indicates a left shift. An IG > 3% may predict positive blood cultures with 98% specificity.  (P<0.04) and 92% Positive Predictive Value (Luis-Yuly)1. Increased immature granulocytes  assist with the detection of infection and/or inflammation and may be present at an early stage  and are more sensitive and specific than band counts.  NEUTROPHILS 08/06/2021 55.4  34 - 64 % Final    LYMPHOCYTES 08/06/2021 23.3* 28 - 48 % Final    MONOCYTES 08/06/2021 16.8* 1 - 13 % Final    EOSINOPHILS 08/06/2021 2.1  0 - 5 % Final    BASOPHILS 08/06/2021 1.6  0 - 3 % Final    Sodium 08/06/2021 136  136 - 145 mEq/L Final    Potassium 08/06/2021 3.7  3.5 - 5.1 mEq/L Final    Chloride 08/06/2021 106  98 - 107 mEq/L Final    CO2 08/06/2021 24  21 - 32 mEq/L Final    Glucose 08/06/2021 87  74 - 106 mg/dl Final    BUN 08/06/2021 7  7 - 25 mg/dl Final    Creatinine 08/06/2021 0.8  0.6 - 1.3 mg/dl Final    GFR est AA 08/06/2021 >60.0    Final    Comment: THE [facility] STATES THAT THE MDRD STUDY EQUATION SHOULD ONLY BE USED ON  INDIVIDUALS 18 OR OLDER. THE REPORT ALSO NOTES THAT THE MDRD STUDY EQUATION HAS NOT BEEN  VALIDATED FOR USE WITH THE ELDERLY (OVER 79YEARS OF AGE), PREGNANT WOMEN, PATIENTS WITH SERIOUS  COMORBID CONDITIONS, OR PERSONS WITH EXTREMES OF BODY SIZE, MUSCLE MASS, OR NUTRITIONAL STATUS. APPLICATION OF THE EQUATION TO THESE PATIENT GROUPS MAY LEAD TO ERRORS IN GFR ESTIMATION.  GFR  ESTIMATING EQUATIONS HAVE POORER AGREEMENT WITH MEASURED GFR FOR ILL HOSPITALIZED PATIENTS AND  FOR PEOPLE WITH NEAR NORMAL KIDNEY FUNCTION THAN
FOR SUBJECTS IN THE MDRD STUDY. VALIDATION  STUDIES ARE IN PROGRESS TO EVALUATE THE MDRD STUDY EQUATION FOR ADDITIONAL ETHNIC GROUPS, THE  ELDERLY, VARIOUS DISEASE CONDITIONS, AND PEOPLE WITH NORMAL KIDNEY FUNCTION. GFRA----REFERS TO   GFRO---REFERS TO OTHER RACES    REFERENCES AVAILABLE UPON REQUEST.        GFR est non-AA 08/06/2021 >60    Final    Calcium 08/06/2021 8.0* 8.5 - 10.1 mg/dl Final    Anion gap 08/06/2021 7  5 - 15 mmol/L Final    Ventricular Rate 08/06/2021 110  BPM Final    Atrial Rate 08/06/2021 110  BPM Final    P-R Interval 08/06/2021 128  ms Final    QRS Duration 08/06/2021 70  ms Final    Q-T Interval 08/06/2021 296  ms Final    QTC Calculation (Bezet) 08/06/2021 400  ms Final    Calculated P Axis 08/06/2021 46  degrees Final    Calculated R Axis 08/06/2021 -4  degrees Final    Calculated T Axis 08/06/2021 49  degrees Final    Diagnosis 08/06/2021    Final                    Value:Sinus tachycardia  Nonspecific T wave abnormality  Abnormal ECG  No previous ECGs available  Confirmed by Eric Lopez M.D., Leander Bunch ([id number]) on 8/6/2021 1:30:06 PM      HCG urine, QL 08/06/2021 NEGATIVE  NEGATIVE   Final   Abstract on 06/15/2021   Component Date Value Ref Range Status    Hemoglobin A1c, External 05/14/2021 7.6  % Final       Results for orders placed or performed in visit on 08/25/21   AMB POC HEMOGLOBIN A1C   Result Value Ref Range    Hemoglobin A1c (POC) 5.8 %       Patient Care Team:  Patient Care Team:  Virginia Silva NP as PCP - General (Nurse Practitioner)  Virginia Silva NP as PCP - Reba Rivas Provider      Assessment / Plan:      ICD-10-CM ICD-9-CM    1. Medicare annual wellness visit, subsequent  Z00.00 V70.0    2. Uncontrolled type 2 diabetes mellitus with hyperglycemia (HCC)  E11.65 250.02 AMB POC HEMOGLOBIN A1C      metFORMIN (GLUCOPHAGE) 1,000 mg tablet   3. WATKINS (dyspnea on exertion)  R06.00 786.09 Wheel Chair anton   4.  Oxygen dependent  Z99.81
V46.2 Wheel Chair anton   5. Essential hypertension  I10 401.9 DISCONTINUED: metoprolol succinate (TOPROL-XL) 50 mg XL tablet   6. Tachycardia  R00.0 785.0 DISCONTINUED: metoprolol succinate (TOPROL-XL) 50 mg XL tablet   7. Lymphedema  I89.0 457.1 REFERRAL TO LYMPHEDEMA CLINIC       Prescription given for Metformin. Follow-up in 1 month  Patient requesting a wheelchair  Lymphedema to the bilateral lower extremities-referral to lymphedema clinic  Hemoglobin A1c on August 25, 2021 was 5.8  Hypertension-reevaluate BP in 1 month. Prescription for metoprolol  I asked the patient if she  had any questions and answered her  questions. The patient stated that she understands the treatment plan and agrees with the treatment plan    This document was created with a voice activated dictation system and may contain transcription errors.
(0) Alert; keenly responsive

## 2021-11-02 ENCOUNTER — APPOINTMENT (OUTPATIENT)
Dept: INTERNAL MEDICINE | Facility: CLINIC | Age: 61
End: 2021-11-02
Payer: COMMERCIAL

## 2021-11-02 ENCOUNTER — RESULT REVIEW (OUTPATIENT)
Age: 61
End: 2021-11-02

## 2021-11-02 ENCOUNTER — APPOINTMENT (OUTPATIENT)
Dept: HEMATOLOGY ONCOLOGY | Facility: CLINIC | Age: 61
End: 2021-11-02
Payer: COMMERCIAL

## 2021-11-02 ENCOUNTER — NON-APPOINTMENT (OUTPATIENT)
Age: 61
End: 2021-11-02

## 2021-11-02 VITALS
HEART RATE: 64 BPM | OXYGEN SATURATION: 97 % | DIASTOLIC BLOOD PRESSURE: 82 MMHG | WEIGHT: 204.4 LBS | SYSTOLIC BLOOD PRESSURE: 134 MMHG | BODY MASS INDEX: 32.08 KG/M2 | HEIGHT: 66.93 IN

## 2021-11-02 VITALS
TEMPERATURE: 97.7 F | SYSTOLIC BLOOD PRESSURE: 116 MMHG | HEART RATE: 59 BPM | OXYGEN SATURATION: 98 % | WEIGHT: 205 LBS | BODY MASS INDEX: 31.17 KG/M2 | RESPIRATION RATE: 16 BRPM | DIASTOLIC BLOOD PRESSURE: 90 MMHG

## 2021-11-02 PROCEDURE — 99215 OFFICE O/P EST HI 40 MIN: CPT

## 2021-11-02 PROCEDURE — 36415 COLL VENOUS BLD VENIPUNCTURE: CPT

## 2021-11-02 PROCEDURE — 99396 PREV VISIT EST AGE 40-64: CPT | Mod: 25

## 2021-11-02 RX ORDER — LEVALBUTEROL TARTRATE 45 UG/1
45 AEROSOL, METERED ORAL EVERY 4 HOURS
Qty: 1 | Refills: 0 | Status: DISCONTINUED | COMMUNITY
Start: 2020-09-28 | End: 2021-11-02

## 2021-11-02 NOTE — HEALTH RISK ASSESSMENT
[Good] : ~his/her~  mood as  good [No falls in past year] : Patient reported no falls in the past year [0] : 2) Feeling down, depressed, or hopeless: Not at all (0) [PHQ-2 Negative - No further assessment needed] : PHQ-2 Negative - No further assessment needed [Fully functional (bathing, dressing, toileting, transferring, walking, feeding)] : Fully functional (bathing, dressing, toileting, transferring, walking, feeding) [Fully functional (using the telephone, shopping, preparing meals, housekeeping, doing laundry, using] : Fully functional and needs no help or supervision to perform IADLs (using the telephone, shopping, preparing meals, housekeeping, doing laundry, using transportation, managing medications and managing finances) [With Patient/Caregiver] : , with patient/caregiver [Reviewed no changes] : Reviewed, no changes [Designated Healthcare Proxy] : Designated healthcare proxy [Name: ___] : Health Care Proxy's Name: [unfilled]  [Relationship: ___] : Relationship: [unfilled] [Yes] : Yes [2 - 3 times a week (3 pts)] : 2 - 3  times a week (3 points) [1 or 2 (0 pts)] : 1 or 2 (0 points) [Never (0 pts)] : Never (0 points) [] : No [[id number]] : 0 [AdvancecareDate] : 11/2/21

## 2021-11-02 NOTE — HISTORY OF PRESENT ILLNESS
[de-identified] : This is annual Preventative examination for this 61 year year old White male.  The patient has been generally feeling well with no new complaints. A complete evaluation of their current medication was reviewed with them including OTC medication .\par \par Chronic medical problems for which they are being followed by a physician are\par    CLL\par    HTN\par \par \par A complete Review of Systems is below but it is noteworthy to mention that this patient denies Chest Pain, Dyspnea on Exertion, Palpitations, urinary problems, rectal bleeding or Gastrointestinal problems at this time.\par \par

## 2021-11-02 NOTE — ASSESSMENT
[FreeTextEntry1] : This is a 61 year -old  M who was examined today for an annual preventative physical.  A complete history and physical examination were performed.  The patient seems to follow a healthy lifestyle in that he  follows a good diet and exercises regularly.  \par \par Physical examination was entirely within normal limits , including a normal blood pressure and pulse.  \par \par \par \par The following recommendations were made:\par Exercise regularly.\par Maintain a healthy diet.\par Covid booster \par Yearly flu shot\par \par I spoke to the patient over the phone shortly after he left to follow-up on the fact that he never had his colonoscopy which had been suggested to him.  The patient said he knows he has been procrastinating but around the time he was going to go was when Covid had started and he has put it off.  He promised me that he would set up a colonoscopy with a gastroenterology group near [facility] and get it done before the end of the year.  I explained to the patient this is a wise thing to do and reiterated that he did have a positive Cologuard.\par \par I have drawn additional bloods to supplement what was done at Dr. Joseph's office today.\par ____________________________________________________\par \par \par

## 2021-11-03 LAB
CHOLEST SERPL-MCNC: 199 MG/DL
ESTIMATED AVERAGE GLUCOSE: 123 MG/DL
HBA1C MFR BLD HPLC: 5.9 %
HDLC SERPL-MCNC: 49 MG/DL
LDLC SERPL CALC-MCNC: 122 MG/DL
NONHDLC SERPL-MCNC: 151 MG/DL
PSA SERPL-MCNC: 3.29 NG/ML
TRIGL SERPL-MCNC: 142 MG/DL
TSH SERPL-ACNC: 3.88 UIU/ML

## 2021-11-06 LAB
ALBUMIN SERPL ELPH-MCNC: 4.3 G/DL
ALP BLD-CCNC: 79 U/L
ALT SERPL-CCNC: 19 U/L
ANION GAP SERPL CALC-SCNC: 12 MMOL/L
AST SERPL-CCNC: 17 U/L
BILIRUB SERPL-MCNC: 0.3 MG/DL
BUN SERPL-MCNC: 16 MG/DL
CALCIUM SERPL-MCNC: 9.5 MG/DL
CHLORIDE SERPL-SCNC: 101 MMOL/L
CO2 SERPL-SCNC: 22 MMOL/L
CREAT SERPL-MCNC: 1.2 MG/DL
GLUCOSE SERPL-MCNC: 113 MG/DL
LDH SERPL-CCNC: 165 U/L
MAGNESIUM SERPL-MCNC: 2.3 MG/DL
PHOSPHATE SERPL-MCNC: 3.1 MG/DL
POTASSIUM SERPL-SCNC: 4.5 MMOL/L
PROT SERPL-MCNC: 7 G/DL
SODIUM SERPL-SCNC: 136 MMOL/L
URATE SERPL-MCNC: 5.9 MG/DL

## 2021-11-15 NOTE — HISTORY OF PRESENT ILLNESS
[de-identified] : Mr. Don is a 61 year old male with CLL (diagnosed 2014) on FASU-0695-YE study who presents for follow-up. \par He denies any new fevers, chills, or night sweats. Appetite is good. Weight stable. No chest pain or shortness of breath. No palpitations. Energy stable. ROS neg.\par He received 2 doses of the COVID vaccine (Moderna)--> he tested positive for antibodies. [de-identified] : CLL [de-identified] : CD38+, ZAP70+\par unmutated IGHV\par TP53 mutation. \par

## 2021-11-15 NOTE — ASSESSMENT
[FreeTextEntry1] : Mr. Don is a 61 year old male with CLL (diagnosed 2014) on FJPH-2692-KK study who presents for follow-up. No new complaints. No evidence of disease progression at this time. Pt to continue ibrutinib vs placebo.\par \par Plan:\par 1)CLL:\par - CBC reviewed, counts overall stable. He is clinically stable as well. Patient underwent a BMBx on 10/1/20 which showed that he is currently in CR. Pt advised to call if he develops any concerning symptoms.\par \par RTO as per protocol, sooner PRN.

## 2021-12-01 ENCOUNTER — RX RENEWAL (OUTPATIENT)
Age: 61
End: 2021-12-01

## 2022-01-03 ENCOUNTER — RX RENEWAL (OUTPATIENT)
Age: 62
End: 2022-01-03

## 2022-01-24 ENCOUNTER — APPOINTMENT (OUTPATIENT)
Dept: CT IMAGING | Facility: IMAGING CENTER | Age: 62
End: 2022-01-24
Payer: SUBSIDIZED

## 2022-01-24 ENCOUNTER — OUTPATIENT (OUTPATIENT)
Dept: OUTPATIENT SERVICES | Facility: HOSPITAL | Age: 62
LOS: 1 days | End: 2022-01-24
Payer: SUBSIDIZED

## 2022-01-24 DIAGNOSIS — C91.10 CHRONIC LYMPHOCYTIC LEUKEMIA OF B-CELL TYPE NOT HAVING ACHIEVED REMISSION: ICD-10-CM

## 2022-01-24 DIAGNOSIS — Z98.890 OTHER SPECIFIED POSTPROCEDURAL STATES: Chronic | ICD-10-CM

## 2022-01-24 PROCEDURE — 74177 CT ABD & PELVIS W/CONTRAST: CPT | Mod: 26

## 2022-01-24 PROCEDURE — 70491 CT SOFT TISSUE NECK W/DYE: CPT

## 2022-01-24 PROCEDURE — 70491 CT SOFT TISSUE NECK W/DYE: CPT | Mod: 26

## 2022-01-24 PROCEDURE — 71260 CT THORAX DX C+: CPT

## 2022-01-24 PROCEDURE — 82565 ASSAY OF CREATININE: CPT

## 2022-01-24 PROCEDURE — 71260 CT THORAX DX C+: CPT | Mod: 26

## 2022-01-24 PROCEDURE — 74177 CT ABD & PELVIS W/CONTRAST: CPT

## 2022-01-26 ENCOUNTER — APPOINTMENT (OUTPATIENT)
Dept: HEMATOLOGY ONCOLOGY | Facility: CLINIC | Age: 62
End: 2022-01-26
Payer: COMMERCIAL

## 2022-01-26 VITALS
RESPIRATION RATE: 14 BRPM | OXYGEN SATURATION: 98 % | TEMPERATURE: 97.6 F | DIASTOLIC BLOOD PRESSURE: 97 MMHG | WEIGHT: 202 LBS | BODY MASS INDEX: 31.7 KG/M2 | HEART RATE: 59 BPM | SYSTOLIC BLOOD PRESSURE: 133 MMHG

## 2022-01-26 PROCEDURE — 99214 OFFICE O/P EST MOD 30 MIN: CPT | Mod: 25

## 2022-01-26 PROCEDURE — 85025 COMPLETE CBC W/AUTO DIFF WBC: CPT

## 2022-01-26 NOTE — ASSESSMENT
[FreeTextEntry1] : Mr. Don is a 61 year old male with CLL (diagnosed 2014) on PHTG-0607-LV study who presents for follow-up. No new complaints. No evidence of disease progression at this time. CT scans from 1/24/22 overall stable (CT neck read pending) Pt to continue ibrutinib vs placebo.\par \par Plan:\par 1)CLL:\par - CBC reviewed, counts overall stable. He is clinically stable as well. Patient underwent a BMBx on 10/1/20 which showed that he is currently in CR. Pt advised to call if he develops any concerning symptoms. \par \par RTO as per protocol, sooner PRN.

## 2022-01-26 NOTE — HISTORY OF PRESENT ILLNESS
[de-identified] : Mr. Don is a 61 year old male with CLL (diagnosed 2014) on ESDU-6703-EQ study who presents for follow-up. \par He denies any new fevers, chills, or night sweats. Appetite is good. Weight stable. No chest pain or shortness of breath. No palpitations. Energy stable. ROS neg.\par He received 2 doses of the COVID vaccine (Moderna)--> he tested positive for antibodies. He received a Moderna booster vaccine.  [de-identified] : CLL [de-identified] : CD38+, ZAP70+\par unmutated IGHV\par TP53 mutation. \par

## 2022-01-27 LAB
ALBUMIN SERPL ELPH-MCNC: 4.5 G/DL
ALP BLD-CCNC: 97 U/L
ALT SERPL-CCNC: 19 U/L
ANION GAP SERPL CALC-SCNC: 12 MMOL/L
AST SERPL-CCNC: 18 U/L
BILIRUB SERPL-MCNC: 0.4 MG/DL
BUN SERPL-MCNC: 17 MG/DL
CALCIUM SERPL-MCNC: 10 MG/DL
CHLORIDE SERPL-SCNC: 100 MMOL/L
CO2 SERPL-SCNC: 23 MMOL/L
CREAT SERPL-MCNC: 1.15 MG/DL
GLUCOSE SERPL-MCNC: 104 MG/DL
LDH SERPL-CCNC: 166 U/L
MAGNESIUM SERPL-MCNC: 2.2 MG/DL
PHOSPHATE SERPL-MCNC: 3.9 MG/DL
POTASSIUM SERPL-SCNC: 4.8 MMOL/L
PROT SERPL-MCNC: 7.5 G/DL
SODIUM SERPL-SCNC: 136 MMOL/L
URATE SERPL-MCNC: 4.7 MG/DL

## 2022-03-25 ENCOUNTER — NON-APPOINTMENT (OUTPATIENT)
Age: 62
End: 2022-03-25

## 2022-03-25 ENCOUNTER — APPOINTMENT (OUTPATIENT)
Dept: FAMILY MEDICINE | Facility: CLINIC | Age: 62
End: 2022-03-25
Payer: COMMERCIAL

## 2022-03-25 VITALS
SYSTOLIC BLOOD PRESSURE: 130 MMHG | DIASTOLIC BLOOD PRESSURE: 80 MMHG | WEIGHT: 202 LBS | BODY MASS INDEX: 30.62 KG/M2 | TEMPERATURE: 97.1 F | RESPIRATION RATE: 16 BRPM | HEART RATE: 66 BPM | OXYGEN SATURATION: 98 % | HEIGHT: 68 IN

## 2022-03-25 PROCEDURE — 00035S: CUSTOM

## 2022-03-25 PROCEDURE — 00011S: CUSTOM

## 2022-03-25 PROCEDURE — 00012S: CUSTOM

## 2022-03-25 PROCEDURE — 00001S: CUSTOM

## 2022-03-25 PROCEDURE — 00009S: CUSTOM

## 2022-03-28 ENCOUNTER — NON-APPOINTMENT (OUTPATIENT)
Age: 62
End: 2022-03-28

## 2022-04-04 ENCOUNTER — NON-APPOINTMENT (OUTPATIENT)
Age: 62
End: 2022-04-04

## 2022-04-11 PROBLEM — Z11.59 SCREENING FOR VIRAL DISEASE: Status: RESOLVED | Noted: 2017-07-13 | Resolved: 2021-04-29

## 2022-04-20 ENCOUNTER — APPOINTMENT (OUTPATIENT)
Dept: HEMATOLOGY ONCOLOGY | Facility: CLINIC | Age: 62
End: 2022-04-20

## 2022-04-20 ENCOUNTER — NON-APPOINTMENT (OUTPATIENT)
Age: 62
End: 2022-04-20

## 2022-04-20 ENCOUNTER — APPOINTMENT (OUTPATIENT)
Dept: HEMATOLOGY ONCOLOGY | Facility: CLINIC | Age: 62
End: 2022-04-20
Payer: COMMERCIAL

## 2022-04-20 VITALS
DIASTOLIC BLOOD PRESSURE: 90 MMHG | BODY MASS INDEX: 31.63 KG/M2 | WEIGHT: 208 LBS | TEMPERATURE: 97.8 F | HEART RATE: 58 BPM | OXYGEN SATURATION: 99 % | RESPIRATION RATE: 16 BRPM | SYSTOLIC BLOOD PRESSURE: 144 MMHG

## 2022-04-20 LAB
ALBUMIN SERPL ELPH-MCNC: 4.3 G/DL
ALP BLD-CCNC: 89 U/L
ALT SERPL-CCNC: 23 U/L
ANION GAP SERPL CALC-SCNC: 10 MMOL/L
AST SERPL-CCNC: 21 U/L
BILIRUB SERPL-MCNC: 0.4 MG/DL
BUN SERPL-MCNC: 18 MG/DL
CALCIUM SERPL-MCNC: 9.4 MG/DL
CHLORIDE SERPL-SCNC: 102 MMOL/L
CO2 SERPL-SCNC: 23 MMOL/L
CREAT SERPL-MCNC: 1.08 MG/DL
EGFR: 78 ML/MIN/1.73M2
GLUCOSE SERPL-MCNC: 105 MG/DL
LDH SERPL-CCNC: 174 U/L
MAGNESIUM SERPL-MCNC: 2.3 MG/DL
PHOSPHATE SERPL-MCNC: 3.6 MG/DL
POTASSIUM SERPL-SCNC: 4.6 MMOL/L
PROT SERPL-MCNC: 6.9 G/DL
SODIUM SERPL-SCNC: 135 MMOL/L
URATE SERPL-MCNC: 5 MG/DL

## 2022-04-20 PROCEDURE — 85025 COMPLETE CBC W/AUTO DIFF WBC: CPT | Mod: GC

## 2022-04-20 PROCEDURE — 99215 OFFICE O/P EST HI 40 MIN: CPT | Mod: GC,25

## 2022-04-20 NOTE — HISTORY OF PRESENT ILLNESS
[de-identified] : Mr. Don is a 61 year old male with CLL (diagnosed 2014) on [id number]-ZX study who presents for follow-up. \par He denies any new fevers, chills, or night sweats. Appetite is good. Weight stable. No chest pain or shortness of breath. No palpitations. Energy stable. ROS neg. He notes occasional diarrhea, but otherwise feels well. No palpitations, joint pain, muscle spasm. \par He received 2 doses of the COVID vaccine (Moderna)--> he tested positive for antibodies. He received a Moderna booster vaccine.  [de-identified] : CLL [de-identified] : CD38+, ZAP70+\par unmutated IGHV\par TP53 mutation. \par

## 2022-04-20 NOTE — END OF VISIT
[FreeTextEntry3] : Patient seen and case discussed with FREDI Kumar. I agree with above and have edited the note where needed.\par

## 2022-04-20 NOTE — ASSESSMENT
[FreeTextEntry1] : Mr. Don is a 62 year old male with CLL (diagnosed 2014) on [id number]-8337-NP study (MRD Cohort) who presents for follow-up. No new complaints. No evidence of disease progression at this time. CT scans from 1/24/22 overall stable (CT neck read pending) Pt to continue ibrutinib vs placebo.\par \par Plan:\par 1)CLL:\par - CBC reviewed, counts overall stable. He is clinically stable as well. Patient underwent a BMBx on 10/1/20 which showed that he is currently in CR. Pt advised to call if he develops any concerning symptoms. \par -Long discussion regarding MRD testing and its prognostic value, all questions answered to his apparent satisfaction\par \par RTO as per protocol, sooner PRN.

## 2022-05-03 ENCOUNTER — APPOINTMENT (OUTPATIENT)
Dept: INTERNAL MEDICINE | Facility: CLINIC | Age: 62
End: 2022-05-03
Payer: COMMERCIAL

## 2022-05-03 VITALS — BODY MASS INDEX: 31.07 KG/M2 | WEIGHT: 205 LBS | HEIGHT: 68 IN

## 2022-05-03 VITALS — HEART RATE: 60 BPM | DIASTOLIC BLOOD PRESSURE: 88 MMHG | SYSTOLIC BLOOD PRESSURE: 149 MMHG

## 2022-05-03 PROCEDURE — 99214 OFFICE O/P EST MOD 30 MIN: CPT

## 2022-05-03 NOTE — PLAN
[FreeTextEntry1] : Hypertension:\par The patient is getting better blood pressure readings at home.  In the office his blood pressure is not exactly at goal, but I will continue to monitor without changing the dose of medication.\par I reviewed the CBC CMP LDH from April 20, 2022 and found them to be acceptable.\par I have advised him to continue his amlodipine 5 mg and losartan 100 mg once a day.\par \par The patient also was concerned about his mother and we had a discussion that led me to make a referral to home hospice.  The patient is under stress with his mother's condition as she appears to be at the end of life.  The patient was reassured that making her comfortable was the best goal for her treatment.\par \par I have spent a total of 35 minutes on the day of the visit both face to face and non-face to face with the patient.\par

## 2022-05-03 NOTE — PHYSICAL EXAM
[No Acute Distress] : no acute distress [No Respiratory Distress] : no respiratory distress  [No Accessory Muscle Use] : no accessory muscle use [Normal Rate] : normal rate  [Regular Rhythm] : with a regular rhythm

## 2022-05-10 ENCOUNTER — NON-APPOINTMENT (OUTPATIENT)
Age: 62
End: 2022-05-10

## 2022-07-13 ENCOUNTER — APPOINTMENT (OUTPATIENT)
Dept: HEMATOLOGY ONCOLOGY | Facility: CLINIC | Age: 62
End: 2022-07-13

## 2022-07-13 VITALS
BODY MASS INDEX: 31.17 KG/M2 | OXYGEN SATURATION: 98 % | HEART RATE: 57 BPM | DIASTOLIC BLOOD PRESSURE: 84 MMHG | RESPIRATION RATE: 16 BRPM | SYSTOLIC BLOOD PRESSURE: 124 MMHG | WEIGHT: 205 LBS | TEMPERATURE: 97.1 F

## 2022-07-13 PROCEDURE — 99213 OFFICE O/P EST LOW 20 MIN: CPT

## 2022-07-13 NOTE — HISTORY OF PRESENT ILLNESS
[de-identified] : Mr. Don is a 62 year old male with CLL (diagnosed 2014) on LVKX-2226-OP study who presents for follow-up. \par He denies any new fevers, chills, or night sweats. Appetite is good. Weight stable. No chest pain or shortness of breath. No palpitations. Energy stable. ROS neg. No diarrhea or constipation. No palpitations, joint pain, muscle spasm. \par He received 2 doses of the COVID vaccine (Moderna)--> he tested positive for antibodies. He received a Moderna booster vaccine.  [de-identified] : CLL [de-identified] : CD38+, ZAP70+\par unmutated IGHV\par TP53 mutation. \par

## 2022-07-13 NOTE — ASSESSMENT
[FreeTextEntry1] : Mr. Don is a 62 year old male with CLL (diagnosed 2014) on XMSU-8024-CO study (MRD Cohort) who presents for follow-up. No new complaints. No evidence of disease progression at this time. CT scans from 1/24/22 overall stable (CT neck read pending) Pt to continue ibrutinib vs placebo.\par \par Plan:\par 1)CLL:\par - CBC reviewed, counts overall stable. He is clinically stable as well. Patient underwent a BMBx on 10/1/20 which showed that he is currently in CR. Pt advised to call if he develops any concerning symptoms. \par -Discussed Evusheld with patient - he wants to hold off for now\par -Pt was re-consented today\par \par RTO as per protocol, sooner PRN.

## 2022-07-14 LAB
ALBUMIN SERPL ELPH-MCNC: 4.4 G/DL
ALP BLD-CCNC: 74 U/L
ALT SERPL-CCNC: 21 U/L
ANION GAP SERPL CALC-SCNC: 11 MMOL/L
AST SERPL-CCNC: 25 U/L
BILIRUB SERPL-MCNC: 0.4 MG/DL
BUN SERPL-MCNC: 14 MG/DL
CALCIUM SERPL-MCNC: 9.6 MG/DL
CHLORIDE SERPL-SCNC: 100 MMOL/L
CO2 SERPL-SCNC: 25 MMOL/L
CREAT SERPL-MCNC: 1.23 MG/DL
EGFR: 66 ML/MIN/1.73M2
GLUCOSE SERPL-MCNC: 109 MG/DL
LDH SERPL-CCNC: 176 U/L
MAGNESIUM SERPL-MCNC: 2.3 MG/DL
PHOSPHATE SERPL-MCNC: 3.4 MG/DL
POTASSIUM SERPL-SCNC: 4.5 MMOL/L
PROT SERPL-MCNC: 7.3 G/DL
SODIUM SERPL-SCNC: 135 MMOL/L
URATE SERPL-MCNC: 5.1 MG/DL

## 2022-07-22 NOTE — ED ADULT NURSE NOTE - NS ED NURSE REPORT GIVEN DT
Airway  Date/Time: 7/22/2022 5:54 PM  Urgency: elective      General Information and Staff    Patient location during procedure: OR  Anesthesiologist: Mandie Hernandez MD  Performed: anesthesiologist     Indications and Patient Condition  Indications for airway management: anesthesia  Sedation level: deep  Preoxygenated: yes  Patient position: sniffing  Mask difficulty assessment: 1 - vent by mask    Final Airway Details  Final airway type: endotracheal airway      Successful airway: ETT  Cuffed: yes   Successful intubation technique: direct laryngoscopy  Facilitating devices/methods: cricoid pressure, rapid sequence intubation and intubating stylet  Endotracheal tube insertion site: oral  Blade: GlideScope  Blade size: #3  ETT size (mm): 7.0    Cormack-Lehane Classification: grade IIA - partial view of glottis  Placement verified by: chest auscultation and capnometry   Measured from: lips  ETT to lips (cm): 20  Number of attempts at approach: 1
19-Jun-2019 14:46

## 2022-09-13 ENCOUNTER — APPOINTMENT (OUTPATIENT)
Dept: INTERNAL MEDICINE | Facility: CLINIC | Age: 62
End: 2022-09-13

## 2022-09-13 VITALS
SYSTOLIC BLOOD PRESSURE: 146 MMHG | WEIGHT: 208 LBS | OXYGEN SATURATION: 99 % | DIASTOLIC BLOOD PRESSURE: 91 MMHG | HEART RATE: 55 BPM | BODY MASS INDEX: 31.52 KG/M2 | HEIGHT: 68 IN

## 2022-09-13 PROCEDURE — 99214 OFFICE O/P EST MOD 30 MIN: CPT

## 2022-09-16 NOTE — PLAN
[FreeTextEntry1] : Hypertension:\par His diastolic blood pressure is mildly elevated.  Continue Lsartan and Amlodipine for the time being.  Told to watch his salt intake.  Will follow up in a few months.\par \par CLL: stable on Imbruvica.

## 2022-09-16 NOTE — HISTORY OF PRESENT ILLNESS
[de-identified] : Doing well.  Last WBC was good.\par \par Generally feeling well. Still in the study for CLL. I reviewed i his recent CBC which was normal.  He tells me that h

## 2022-10-03 ENCOUNTER — APPOINTMENT (OUTPATIENT)
Dept: HEMATOLOGY ONCOLOGY | Facility: CLINIC | Age: 62
End: 2022-10-03

## 2022-10-05 LAB
ALBUMIN SERPL ELPH-MCNC: 4.1 G/DL
ALP BLD-CCNC: 82 U/L
ALT SERPL-CCNC: 21 U/L
ANION GAP SERPL CALC-SCNC: 10 MMOL/L
AST SERPL-CCNC: 18 U/L
BILIRUB SERPL-MCNC: 0.3 MG/DL
BUN SERPL-MCNC: 14 MG/DL
CALCIUM SERPL-MCNC: 9.1 MG/DL
CHLORIDE SERPL-SCNC: 100 MMOL/L
CO2 SERPL-SCNC: 25 MMOL/L
CREAT SERPL-MCNC: 1.15 MG/DL
EGFR: 72 ML/MIN/1.73M2
GLUCOSE SERPL-MCNC: 129 MG/DL
LDH SERPL-CCNC: 162 U/L
MAGNESIUM SERPL-MCNC: 2.1 MG/DL
PHOSPHATE SERPL-MCNC: 3.2 MG/DL
POTASSIUM SERPL-SCNC: 3.9 MMOL/L
PROT SERPL-MCNC: 6.7 G/DL
SODIUM SERPL-SCNC: 135 MMOL/L
URATE SERPL-MCNC: 6.1 MG/DL

## 2022-10-06 ENCOUNTER — APPOINTMENT (OUTPATIENT)
Dept: HEMATOLOGY ONCOLOGY | Facility: CLINIC | Age: 62
End: 2022-10-06

## 2022-10-06 ENCOUNTER — RESULT REVIEW (OUTPATIENT)
Age: 62
End: 2022-10-06

## 2022-10-06 VITALS
BODY MASS INDEX: 32.39 KG/M2 | OXYGEN SATURATION: 97 % | TEMPERATURE: 97.4 F | RESPIRATION RATE: 14 BRPM | DIASTOLIC BLOOD PRESSURE: 70 MMHG | HEART RATE: 71 BPM | SYSTOLIC BLOOD PRESSURE: 130 MMHG | WEIGHT: 213 LBS

## 2022-10-06 PROCEDURE — 99214 OFFICE O/P EST MOD 30 MIN: CPT | Mod: GC

## 2022-10-06 NOTE — RESULTS/DATA
[FreeTextEntry1] : CBC with diff done 10/3/22\par WBC 8.9\par ALC 2.10\par ANC 5.98\par Hgb 15.0\par HCT 43.6\par

## 2022-10-06 NOTE — END OF VISIT
[Time Spent: ___ minutes] : I have spent [unfilled] minutes of time on the encounter. [FreeTextEntry3] : \par Patient seen and case discussed with FREDI Desai. I agree with above and have edited the note where needed.

## 2022-10-06 NOTE — HISTORY OF PRESENT ILLNESS
[de-identified] : Mr. Don is a 62 year old male with CLL (diagnosed 2014) on [id number] study who presents for follow-up. \par Pt had COVID booster on 10/4/22, he had one episode of fever of 100.2 which has resolved. Pt denies night sweats. Appetite is good. Weight stable. No chest pain or shortness of breath. No palpitations. Energy stable. ROS neg. No diarrhea or constipation. No palpitations, joint pain, muscle spasm. \par He received 2 doses of the COVID vaccine (Moderna)--> he tested positive for antibodies. He received a Moderna booster vaccine.  [de-identified] : CLL [de-identified] : CD38+, ZAP70+\par unmutated IGHV\par TP53 mutation. \par

## 2022-10-06 NOTE — ASSESSMENT
[FreeTextEntry1] : Mr. Don is a 62 year old male with CLL (diagnosed 2014) on [id number] study (MRD Cohort) who presents for follow-up. No new complaints. No evidence of disease progression at this time. CT scans from 1/24/22 overall stable (CT neck read pending) Pt to continue ibrutinib vs placebo.\par \par Plan:\par 1)CLL:\par - CBC reviewed, counts overall stable. He is clinically stable as well. Patient underwent a BMBx on 10/1/20 which showed that he is currently in CR. Pt advised to call if he develops any concerning symptoms. \par -Discussed Evusheld with patient - he wants to hold off for now\par -Pt encouraged to have colonoscopy as he's never had one to date\par \par \par RTO as per protocol, sooner PRN.

## 2022-10-12 ENCOUNTER — TRANSCRIPTION ENCOUNTER (OUTPATIENT)
Age: 62
End: 2022-10-12

## 2022-10-13 ENCOUNTER — APPOINTMENT (OUTPATIENT)
Dept: DISASTER EMERGENCY | Facility: HOSPITAL | Age: 62
End: 2022-10-13

## 2022-10-13 ENCOUNTER — OUTPATIENT (OUTPATIENT)
Dept: OUTPATIENT SERVICES | Facility: HOSPITAL | Age: 62
LOS: 1 days | End: 2022-10-13

## 2022-10-13 VITALS
RESPIRATION RATE: 18 BRPM | WEIGHT: 210.1 LBS | HEIGHT: 68 IN | HEART RATE: 71 BPM | TEMPERATURE: 98 F | DIASTOLIC BLOOD PRESSURE: 81 MMHG | OXYGEN SATURATION: 97 % | SYSTOLIC BLOOD PRESSURE: 163 MMHG

## 2022-10-13 VITALS
OXYGEN SATURATION: 96 % | HEART RATE: 70 BPM | DIASTOLIC BLOOD PRESSURE: 76 MMHG | SYSTOLIC BLOOD PRESSURE: 129 MMHG | TEMPERATURE: 99 F | RESPIRATION RATE: 18 BRPM

## 2022-10-13 DIAGNOSIS — U07.1 COVID-19: ICD-10-CM

## 2022-10-13 DIAGNOSIS — Z98.890 OTHER SPECIFIED POSTPROCEDURAL STATES: Chronic | ICD-10-CM

## 2022-10-13 RX ORDER — BEBTELOVIMAB 87.5 MG/ML
175 INJECTION, SOLUTION INTRAVENOUS ONCE
Refills: 0 | Status: COMPLETED | OUTPATIENT
Start: 2022-10-13 | End: 2022-10-13

## 2022-10-13 RX ADMIN — BEBTELOVIMAB 175 MILLIGRAM(S): 87.5 INJECTION, SOLUTION INTRAVENOUS at 09:05

## 2022-10-13 NOTE — CHART NOTE - NSCHARTNOTEFT_GEN_A_CORE
CC: Monoclonal Antibody Administration/COVID 19 Positive      History: Patient presents for administration of monoclonal antibody  Patient has been screened and was deemed to be a candidate.    Exposure: Wife COVID +    Symptoms/ Criteria: fever cough rhinitis sore throat    Inclusion Criteria: age > 60 years  CLL      Date of Positive Test: verified by clinical call center     Date of symptom onset: 10/11    Vaccine status: Moderna  x 4  last 10/4/22    PMHx:  Infection due to severe acute respiratory syndrome coronavirus 2 (SARS-CoV-2)    CLL (chronic lymphocytic leukemia)    Hypertension    H/O hernia repair          T(C): 37.2 (10-13-22 @ 09:20), Max: 37.2 (10-13-22 @ 09:20)  HR: 72 (10-13-22 @ 09:20) (71 - 72)  BP: 138/77 (10-13-22 @ 09:20) (138/77 - 163/81)  RR: 18 (10-13-22 @ 09:20) (18 - 18)  SpO2: 98% (10-13-22 @ 09:20) (97% - 98%)      PE:   Appearance: NAD	  HEENT:   Normal oral mucosa.   Lymphatic: No lymphadenopathy  Cardiovascular: Normal S1 S2, No JVD, No murmurs, No edema  Respiratory: Lungs clear to auscultation	  Gastrointestinal:  Soft, Non-tender. No guarding   Skin: warm and dry  Neurologic: Non-focal  Extremities: Normal range of motion.     ASSESSMENT:  Pt is a 62y year old Male with PMH  HTN  CLL  Covid +  referred to the infusion center for Monoclonal antibody administration  (Bebtelovimab).        PLAN:  - Administration procedure explained to patient   - Consent for monoclonal antibody administration obtained   - Risk & benefits discussed/all questions answered  - Administer Bebtelovimab per protocol  - will observe patient for one hour post administration  and then if stable discharge home with outpt follow up as planned by PMD.        - Patient tolerated treatment well denies complaints of chest pain/SOB/dizziness/ palpitations  - VSS for discharge home  - D/C instructions given/ fact sheet included.  - Patient to follow-up with PCP as needed.

## 2022-11-17 ENCOUNTER — RX RENEWAL (OUTPATIENT)
Age: 62
End: 2022-11-17

## 2022-12-17 ENCOUNTER — RX RENEWAL (OUTPATIENT)
Age: 62
End: 2022-12-17

## 2022-12-20 ENCOUNTER — OUTPATIENT (OUTPATIENT)
Dept: OUTPATIENT SERVICES | Facility: HOSPITAL | Age: 62
LOS: 1 days | Discharge: HOME | End: 2022-12-20

## 2022-12-20 DIAGNOSIS — Z98.890 OTHER SPECIFIED POSTPROCEDURAL STATES: Chronic | ICD-10-CM

## 2022-12-20 DIAGNOSIS — D64.9 ANEMIA, UNSPECIFIED: ICD-10-CM

## 2022-12-29 ENCOUNTER — APPOINTMENT (OUTPATIENT)
Dept: HEMATOLOGY ONCOLOGY | Facility: CLINIC | Age: 62
End: 2022-12-29

## 2022-12-29 ENCOUNTER — APPOINTMENT (OUTPATIENT)
Dept: CT IMAGING | Facility: IMAGING CENTER | Age: 62
End: 2022-12-29
Payer: SUBSIDIZED

## 2022-12-29 ENCOUNTER — OUTPATIENT (OUTPATIENT)
Dept: OUTPATIENT SERVICES | Facility: HOSPITAL | Age: 62
LOS: 1 days | End: 2022-12-29
Payer: SUBSIDIZED

## 2022-12-29 ENCOUNTER — RESULT REVIEW (OUTPATIENT)
Age: 62
End: 2022-12-29

## 2022-12-29 VITALS
SYSTOLIC BLOOD PRESSURE: 144 MMHG | TEMPERATURE: 97.2 F | RESPIRATION RATE: 16 BRPM | OXYGEN SATURATION: 98 % | BODY MASS INDEX: 32.35 KG/M2 | DIASTOLIC BLOOD PRESSURE: 80 MMHG | HEART RATE: 62 BPM | WEIGHT: 212.72 LBS

## 2022-12-29 DIAGNOSIS — C91.10 CHRONIC LYMPHOCYTIC LEUKEMIA OF B-CELL TYPE NOT HAVING ACHIEVED REMISSION: ICD-10-CM

## 2022-12-29 LAB
BASOPHILS # BLD AUTO: 0.1 K/UL — SIGNIFICANT CHANGE UP (ref 0–0.2)
BASOPHILS NFR BLD AUTO: 1.2 % — SIGNIFICANT CHANGE UP (ref 0–2)
EOSINOPHIL # BLD AUTO: 0.18 K/UL — SIGNIFICANT CHANGE UP (ref 0–0.5)
EOSINOPHIL NFR BLD AUTO: 2.2 % — SIGNIFICANT CHANGE UP (ref 0–6)
HCT VFR BLD CALC: 47.7 % — SIGNIFICANT CHANGE UP (ref 39–50)
HGB BLD-MCNC: 16.2 G/DL — SIGNIFICANT CHANGE UP (ref 13–17)
IMM GRANULOCYTES NFR BLD AUTO: 0.5 % — SIGNIFICANT CHANGE UP (ref 0–0.9)
LYMPHOCYTES # BLD AUTO: 1.88 K/UL — SIGNIFICANT CHANGE UP (ref 1–3.3)
LYMPHOCYTES # BLD AUTO: 22.6 % — SIGNIFICANT CHANGE UP (ref 13–44)
MCHC RBC-ENTMCNC: 28.3 PG — SIGNIFICANT CHANGE UP (ref 27–34)
MCHC RBC-ENTMCNC: 34 G/DL — SIGNIFICANT CHANGE UP (ref 32–36)
MCV RBC AUTO: 83.2 FL — SIGNIFICANT CHANGE UP (ref 80–100)
MONOCYTES # BLD AUTO: 0.75 K/UL — SIGNIFICANT CHANGE UP (ref 0–0.9)
MONOCYTES NFR BLD AUTO: 9 % — SIGNIFICANT CHANGE UP (ref 2–14)
NEUTROPHILS # BLD AUTO: 5.37 K/UL — SIGNIFICANT CHANGE UP (ref 1.8–7.4)
NEUTROPHILS NFR BLD AUTO: 64.5 % — SIGNIFICANT CHANGE UP (ref 43–77)
NRBC # BLD: 0 /100 WBCS — SIGNIFICANT CHANGE UP (ref 0–0)
PLATELET # BLD AUTO: 164 K/UL — SIGNIFICANT CHANGE UP (ref 150–400)
RBC # BLD: 5.73 M/UL — SIGNIFICANT CHANGE UP (ref 4.2–5.8)
RBC # FLD: 13.9 % — SIGNIFICANT CHANGE UP (ref 10.3–14.5)
WBC # BLD: 8.32 K/UL — SIGNIFICANT CHANGE UP (ref 3.8–10.5)
WBC # FLD AUTO: 8.32 K/UL — SIGNIFICANT CHANGE UP (ref 3.8–10.5)

## 2022-12-29 PROCEDURE — 70491 CT SOFT TISSUE NECK W/DYE: CPT | Mod: 26

## 2022-12-29 PROCEDURE — 99214 OFFICE O/P EST MOD 30 MIN: CPT | Mod: GC

## 2022-12-29 PROCEDURE — 71260 CT THORAX DX C+: CPT | Mod: 26

## 2022-12-29 PROCEDURE — 74177 CT ABD & PELVIS W/CONTRAST: CPT | Mod: 26

## 2022-12-29 PROCEDURE — 70491 CT SOFT TISSUE NECK W/DYE: CPT

## 2022-12-29 PROCEDURE — 74177 CT ABD & PELVIS W/CONTRAST: CPT

## 2022-12-29 PROCEDURE — 71260 CT THORAX DX C+: CPT

## 2022-12-29 NOTE — END OF VISIT
[FreeTextEntry3] : \par Patient seen and case discussed with FREDI Desai. I agree with above and have edited the note where needed.

## 2022-12-29 NOTE — ASSESSMENT
[FreeTextEntry1] : Mr. Don is a 62 year old male with CLL (diagnosed 2014) on [id number] study (MRD Cohort) who presents for follow-up. No new complaints. No evidence of disease progression at this time. CT scans from 1/24/22 overall stable (CT neck read pending) Pt to continue ibrutinib vs placebo.\par \par Plan:\par 1)CLL:\par - CBC reviewed, counts overall stable. He is clinically stable as well. Patient underwent a BMBx on 10/1/20 which showed that he is currently in CR. Pt advised to call if he develops any concerning symptoms. \par -Discussed Evusheld with patient - he wants to hold off for now\par -Pt encouraged to have colonoscopy as he's never had one to date\par \par \par RTO as per protocol, sooner PRN.

## 2022-12-29 NOTE — HISTORY OF PRESENT ILLNESS
[de-identified] : Mr. Don is a 62 year old male with CLL (diagnosed 2014) on [id number]-HA study who presents for follow-up. \par Pt had COVID booster on 10/4/22, he had one episode of fever of 100.2 which has resolved. Pt denies night sweats. Appetite is good. Weight stable. No chest pain or shortness of breath. No palpitations. Energy stable. ROS neg. No diarrhea or constipation. No palpitations, joint pain, muscle spasm. \par He received 2 doses of the COVID vaccine (Moderna)--> he tested positive for antibodies. He received a Moderna booster vaccine.  [de-identified] : CLL [de-identified] : CD38+, ZAP70+\par unmutated IGHV\par TP53 mutation. \par  [de-identified] : 1

## 2022-12-30 LAB
ALBUMIN SERPL ELPH-MCNC: 4.4 G/DL
ALP BLD-CCNC: 75 U/L
ALT SERPL-CCNC: 31 U/L
ANION GAP SERPL CALC-SCNC: 11 MMOL/L
AST SERPL-CCNC: 27 U/L
BILIRUB SERPL-MCNC: 0.6 MG/DL
BUN SERPL-MCNC: 20 MG/DL
CALCIUM SERPL-MCNC: 9.3 MG/DL
CHLORIDE SERPL-SCNC: 98 MMOL/L
CO2 SERPL-SCNC: 23 MMOL/L
CREAT SERPL-MCNC: 1.11 MG/DL
EGFR: 75 ML/MIN/1.73M2
GLUCOSE SERPL-MCNC: 90 MG/DL
LDH SERPL-CCNC: 180 U/L
MAGNESIUM SERPL-MCNC: 2.2 MG/DL
PHOSPHATE SERPL-MCNC: 3.3 MG/DL
POTASSIUM SERPL-SCNC: 4.3 MMOL/L
PROT SERPL-MCNC: 7.1 G/DL
SODIUM SERPL-SCNC: 133 MMOL/L
URATE SERPL-MCNC: 5.8 MG/DL

## 2023-01-23 ENCOUNTER — APPOINTMENT (OUTPATIENT)
Dept: INTERNAL MEDICINE | Facility: CLINIC | Age: 63
End: 2023-01-23
Payer: COMMERCIAL

## 2023-01-23 ENCOUNTER — NON-APPOINTMENT (OUTPATIENT)
Age: 63
End: 2023-01-23

## 2023-01-23 VITALS
BODY MASS INDEX: 31.83 KG/M2 | HEART RATE: 68 BPM | DIASTOLIC BLOOD PRESSURE: 84 MMHG | OXYGEN SATURATION: 98 % | HEIGHT: 68 IN | WEIGHT: 210 LBS | SYSTOLIC BLOOD PRESSURE: 139 MMHG

## 2023-01-23 PROCEDURE — 93000 ELECTROCARDIOGRAM COMPLETE: CPT | Mod: 59

## 2023-01-23 PROCEDURE — 36415 COLL VENOUS BLD VENIPUNCTURE: CPT

## 2023-01-23 PROCEDURE — 99396 PREV VISIT EST AGE 40-64: CPT | Mod: 25

## 2023-01-23 RX ORDER — SILDENAFIL 100 MG/1
100 TABLET, FILM COATED ORAL
Qty: 6 | Refills: 5 | Status: ACTIVE | COMMUNITY
Start: 2018-06-11 | End: 1900-01-01

## 2023-01-23 NOTE — PHYSICAL EXAM
[No Acute Distress] : no acute distress [Well Nourished] : well nourished [Well Developed] : well developed [Well-Appearing] : well-appearing [Normal Sclera/Conjunctiva] : normal sclera/conjunctiva [PERRL] : pupils equal round and reactive to light [EOMI] : extraocular movements intact [Normal Outer Ear/Nose] : the outer ears and nose were normal in appearance [Normal Oropharynx] : the oropharynx was normal [No JVD] : no jugular venous distention [No Lymphadenopathy] : no lymphadenopathy [Supple] : supple [Thyroid Normal, No Nodules] : the thyroid was normal and there were no nodules present [No Respiratory Distress] : no respiratory distress  [No Accessory Muscle Use] : no accessory muscle use [Normal Rate] : normal rate  [Clear to Auscultation] : lungs were clear to auscultation bilaterally [Regular Rhythm] : with a regular rhythm [Normal S1, S2] : normal S1 and S2 [No Murmur] : no murmur heard [No Carotid Bruits] : no carotid bruits [No Abdominal Bruit] : a ~M bruit was not heard ~T in the abdomen [No Varicosities] : no varicosities [Pedal Pulses Present] : the pedal pulses are present [No Edema] : there was no peripheral edema [No Palpable Aorta] : no palpable aorta [No Extremity Clubbing/Cyanosis] : no extremity clubbing/cyanosis [Soft] : abdomen soft [Non Tender] : non-tender [Non-distended] : non-distended [No Masses] : no abdominal mass palpated [No HSM] : no HSM [Normal Bowel Sounds] : normal bowel sounds [Normal Posterior Cervical Nodes] : no posterior cervical lymphadenopathy [Normal Anterior Cervical Nodes] : no anterior cervical lymphadenopathy [No CVA Tenderness] : no CVA  tenderness [No Spinal Tenderness] : no spinal tenderness [No Joint Swelling] : no joint swelling [Grossly Normal Strength/Tone] : grossly normal strength/tone [No Rash] : no rash [Coordination Grossly Intact] : coordination grossly intact [No Focal Deficits] : no focal deficits [Normal Gait] : normal gait [Deep Tendon Reflexes (DTR)] : deep tendon reflexes were 2+ and symmetric [Normal Affect] : the affect was normal [Normal Insight/Judgement] : insight and judgment were intact [FreeTextEntry1] : Patient refuses rectal exam.

## 2023-01-23 NOTE — ASSESSMENT
[FreeTextEntry1] : This is a 63 year -old  M who was here today for an annual preventative physical.  A history, relevant physical examination and Health Risk Assessment were performed.  .  \par \par \par The following recommendations were made:\par Exercise regularly.\par Maintain a healthy diet.\par Yearly Flu shot.\par \par _____________________________________\par Patient should have an upper endoscopy as well as a colonoscopy. Patient has not had any CRC screening.  Because of his longstanding reflux I have discussed the possibility that he could have a Perez's esophagus.\par \par Patient was also advised to drink less alcohol and to skip at least 2 days a week.

## 2023-01-23 NOTE — HEALTH RISK ASSESSMENT
[Good] : ~his/her~  mood as  good [Never] : Never [4 or more  times a week (4 pts)] : 4 or more  times a week (4 points) [1 or 2 (0 pts)] : 1 or 2 (0 points) [No falls in past year] : Patient reported no falls in the past year [0] : 2) Feeling down, depressed, or hopeless: Not at all (0) [PHQ-2 Negative - No further assessment needed] : PHQ-2 Negative - No further assessment needed [] :  [With Patient/Caregiver] : , with patient/caregiver [Relationship: ___] : Relationship: [unfilled] [[id number]] : 0 [AdvancecareDate] : 01/23

## 2023-01-25 ENCOUNTER — NON-APPOINTMENT (OUTPATIENT)
Age: 63
End: 2023-01-25

## 2023-01-25 LAB
ALBUMIN SERPL ELPH-MCNC: 4.1 G/DL
ALP BLD-CCNC: 89 U/L
ALT SERPL-CCNC: 25 U/L
ANION GAP SERPL CALC-SCNC: 13 MMOL/L
APPEARANCE: CLEAR
AST SERPL-CCNC: 27 U/L
BACTERIA: NEGATIVE
BASOPHILS # BLD AUTO: 0.08 K/UL
BASOPHILS NFR BLD AUTO: 0.8 %
BILIRUB SERPL-MCNC: 0.4 MG/DL
BILIRUBIN URINE: NEGATIVE
BLOOD URINE: NEGATIVE
BUN SERPL-MCNC: 18 MG/DL
CALCIUM SERPL-MCNC: 9.5 MG/DL
CHLORIDE SERPL-SCNC: 104 MMOL/L
CHOLEST SERPL-MCNC: 185 MG/DL
CO2 SERPL-SCNC: 19 MMOL/L
COLOR: NORMAL
CREAT SERPL-MCNC: 1.08 MG/DL
EGFR: 77 ML/MIN/1.73M2
EOSINOPHIL # BLD AUTO: 0.19 K/UL
EOSINOPHIL NFR BLD AUTO: 1.8 %
ESTIMATED AVERAGE GLUCOSE: 128 MG/DL
GLUCOSE QUALITATIVE U: NEGATIVE
GLUCOSE SERPL-MCNC: 91 MG/DL
HBA1C MFR BLD HPLC: 6.1 %
HCT VFR BLD CALC: 45.2 %
HDLC SERPL-MCNC: 49 MG/DL
HGB BLD-MCNC: 14.8 G/DL
HYALINE CASTS: 0 /LPF
IMM GRANULOCYTES NFR BLD AUTO: 0.5 %
KETONES URINE: NEGATIVE
LDLC SERPL CALC-MCNC: 111 MG/DL
LEUKOCYTE ESTERASE URINE: NEGATIVE
LYMPHOCYTES # BLD AUTO: 2.12 K/UL
LYMPHOCYTES NFR BLD AUTO: 20.2 %
MAN DIFF?: NORMAL
MCHC RBC-ENTMCNC: 29 PG
MCHC RBC-ENTMCNC: 32.7 GM/DL
MCV RBC AUTO: 88.5 FL
MICROSCOPIC-UA: NORMAL
MONOCYTES # BLD AUTO: 1.07 K/UL
MONOCYTES NFR BLD AUTO: 10.2 %
NEUTROPHILS # BLD AUTO: 7 K/UL
NEUTROPHILS NFR BLD AUTO: 66.5 %
NITRITE URINE: NEGATIVE
NONHDLC SERPL-MCNC: 136 MG/DL
PH URINE: 6
PLATELET # BLD AUTO: 153 K/UL
POTASSIUM SERPL-SCNC: 4.3 MMOL/L
PROT SERPL-MCNC: 6.6 G/DL
PROTEIN URINE: NEGATIVE
PSA SERPL-MCNC: 2.58 NG/ML
RBC # BLD: 5.11 M/UL
RBC # FLD: 14.6 %
RED BLOOD CELLS URINE: 1 /HPF
SODIUM SERPL-SCNC: 137 MMOL/L
SPECIFIC GRAVITY URINE: 1.01
SQUAMOUS EPITHELIAL CELLS: 0 /HPF
T4 SERPL-MCNC: 6.9 UG/DL
TRIGL SERPL-MCNC: 122 MG/DL
TSH SERPL-ACNC: 4.13 UIU/ML
URATE SERPL-MCNC: 4.9 MG/DL
UROBILINOGEN URINE: NORMAL
WBC # FLD AUTO: 10.51 K/UL
WHITE BLOOD CELLS URINE: 1 /HPF

## 2023-03-16 ENCOUNTER — OUTPATIENT (OUTPATIENT)
Dept: OUTPATIENT SERVICES | Facility: HOSPITAL | Age: 63
LOS: 1 days | Discharge: HOME | End: 2023-03-16

## 2023-03-16 DIAGNOSIS — D64.9 ANEMIA, UNSPECIFIED: ICD-10-CM

## 2023-03-16 DIAGNOSIS — Z98.890 OTHER SPECIFIED POSTPROCEDURAL STATES: Chronic | ICD-10-CM

## 2023-03-21 ENCOUNTER — RESULT REVIEW (OUTPATIENT)
Age: 63
End: 2023-03-21

## 2023-03-21 ENCOUNTER — APPOINTMENT (OUTPATIENT)
Dept: HEMATOLOGY ONCOLOGY | Facility: CLINIC | Age: 63
End: 2023-03-21
Payer: COMMERCIAL

## 2023-03-21 VITALS
TEMPERATURE: 97 F | DIASTOLIC BLOOD PRESSURE: 84 MMHG | RESPIRATION RATE: 16 BRPM | OXYGEN SATURATION: 99 % | SYSTOLIC BLOOD PRESSURE: 148 MMHG | WEIGHT: 208.09 LBS | BODY MASS INDEX: 31.64 KG/M2 | HEART RATE: 82 BPM

## 2023-03-21 LAB
ALBUMIN SERPL ELPH-MCNC: 4.2 G/DL
ALP BLD-CCNC: 86 U/L
ALT SERPL-CCNC: 24 U/L
ANION GAP SERPL CALC-SCNC: 15 MMOL/L
AST SERPL-CCNC: 21 U/L
BASOPHILS # BLD AUTO: 0.1 K/UL — SIGNIFICANT CHANGE UP (ref 0–0.2)
BASOPHILS NFR BLD AUTO: 1.3 % — SIGNIFICANT CHANGE UP (ref 0–2)
BILIRUB SERPL-MCNC: 0.3 MG/DL
BUN SERPL-MCNC: 15 MG/DL
CALCIUM SERPL-MCNC: 9.9 MG/DL
CHLORIDE SERPL-SCNC: 102 MMOL/L
CO2 SERPL-SCNC: 21 MMOL/L
CREAT SERPL-MCNC: 1.1 MG/DL
EGFR: 75 ML/MIN/1.73M2
EOSINOPHIL # BLD AUTO: 0.16 K/UL — SIGNIFICANT CHANGE UP (ref 0–0.5)
EOSINOPHIL NFR BLD AUTO: 2.1 % — SIGNIFICANT CHANGE UP (ref 0–6)
GLUCOSE SERPL-MCNC: 124 MG/DL
HCT VFR BLD CALC: 46.5 % — SIGNIFICANT CHANGE UP (ref 39–50)
HGB BLD-MCNC: 15.8 G/DL — SIGNIFICANT CHANGE UP (ref 13–17)
IMM GRANULOCYTES NFR BLD AUTO: 0.4 % — SIGNIFICANT CHANGE UP (ref 0–0.9)
LDH SERPL-CCNC: 175 U/L
LYMPHOCYTES # BLD AUTO: 1.83 K/UL — SIGNIFICANT CHANGE UP (ref 1–3.3)
LYMPHOCYTES # BLD AUTO: 24.5 % — SIGNIFICANT CHANGE UP (ref 13–44)
MAGNESIUM SERPL-MCNC: 2.1 MG/DL
MCHC RBC-ENTMCNC: 28.5 PG — SIGNIFICANT CHANGE UP (ref 27–34)
MCHC RBC-ENTMCNC: 34 G/DL — SIGNIFICANT CHANGE UP (ref 32–36)
MCV RBC AUTO: 83.9 FL — SIGNIFICANT CHANGE UP (ref 80–100)
MONOCYTES # BLD AUTO: 0.65 K/UL — SIGNIFICANT CHANGE UP (ref 0–0.9)
MONOCYTES NFR BLD AUTO: 8.7 % — SIGNIFICANT CHANGE UP (ref 2–14)
NEUTROPHILS # BLD AUTO: 4.7 K/UL — SIGNIFICANT CHANGE UP (ref 1.8–7.4)
NEUTROPHILS NFR BLD AUTO: 63 % — SIGNIFICANT CHANGE UP (ref 43–77)
NRBC # BLD: 0 /100 WBCS — SIGNIFICANT CHANGE UP (ref 0–0)
PHOSPHATE SERPL-MCNC: 3.6 MG/DL
PLATELET # BLD AUTO: 164 K/UL — SIGNIFICANT CHANGE UP (ref 150–400)
POTASSIUM SERPL-SCNC: 4.2 MMOL/L
PROT SERPL-MCNC: 7.1 G/DL
RBC # BLD: 5.54 M/UL — SIGNIFICANT CHANGE UP (ref 4.2–5.8)
RBC # FLD: 13.5 % — SIGNIFICANT CHANGE UP (ref 10.3–14.5)
SODIUM SERPL-SCNC: 137 MMOL/L
URATE SERPL-MCNC: 5.4 MG/DL
WBC # BLD: 7.47 K/UL — SIGNIFICANT CHANGE UP (ref 3.8–10.5)
WBC # FLD AUTO: 7.47 K/UL — SIGNIFICANT CHANGE UP (ref 3.8–10.5)

## 2023-03-21 PROCEDURE — 99214 OFFICE O/P EST MOD 30 MIN: CPT | Mod: GC

## 2023-03-22 NOTE — HISTORY OF PRESENT ILLNESS
[de-identified] : Mr. Don is a 63 year old male with CLL (diagnosed 2014) on [id number]-[id number] study who presents for follow-up. \par He feels well and has no new complaints.  Pt denies night sweats. Appetite is good. Weight stable. No chest pain or shortness of breath. No palpitations. Energy stable. ROS neg. No diarrhea or constipation. No palpitations, joint pain, muscle spasm. \par He received 2 doses of the COVID vaccine (Moderna)--> he tested positive for antibodies. He received a Moderna booster vaccine.  [de-identified] : CLL [de-identified] : CD38+, ZAP70+\par unmutated IGHV\par TP53 mutation. \par

## 2023-03-22 NOTE — ASSESSMENT
[FreeTextEntry1] : Mr. Don is a 63 year old male with CLL (diagnosed 2014) on [id number]-HL study (MRD Cohort) who presents for follow-up. No new complaints. No evidence of disease progression at this time. Today is EOT visit. He was unblinded and has been on the ibrutinib arm. He will continue commerical ibrutinib. \par \par Plan:\par 1)CLL:\par - CBC reviewed, counts overall stable. He is clinically stable as well. Patient underwent a BMBx on 10/1/20 which showed that he is currently in CR. Pt advised to call if he develops any concerning symptoms. \par -continue ibrutinib 420 mg daily \par -Pt encouraged today again to have colonoscopy as he's never had one to date\par \par \par RTO in 3 months for follow up or sooner PRN

## 2023-03-22 NOTE — END OF VISIT
[FreeTextEntry3] : Patient seen and case discussed with FREDI Kumra. I agree with above and have edited the note where needed.\par

## 2023-06-08 RX ORDER — ALBUTEROL SULFATE 90 UG/1
108 (90 BASE) INHALANT RESPIRATORY (INHALATION)
Qty: 1 | Refills: 0 | Status: ACTIVE | COMMUNITY
Start: 2023-06-08 | End: 1900-01-01

## 2023-06-15 ENCOUNTER — OUTPATIENT (OUTPATIENT)
Dept: OUTPATIENT SERVICES | Facility: HOSPITAL | Age: 63
LOS: 1 days | Discharge: HOME | End: 2023-06-15

## 2023-06-15 DIAGNOSIS — D64.9 ANEMIA, UNSPECIFIED: ICD-10-CM

## 2023-06-15 DIAGNOSIS — Z98.890 OTHER SPECIFIED POSTPROCEDURAL STATES: Chronic | ICD-10-CM

## 2023-06-20 ENCOUNTER — NON-APPOINTMENT (OUTPATIENT)
Age: 63
End: 2023-06-20

## 2023-06-20 ENCOUNTER — APPOINTMENT (OUTPATIENT)
Dept: FAMILY MEDICINE | Facility: CLINIC | Age: 63
End: 2023-06-20
Payer: COMMERCIAL

## 2023-06-20 ENCOUNTER — APPOINTMENT (OUTPATIENT)
Dept: HEMATOLOGY ONCOLOGY | Facility: CLINIC | Age: 63
End: 2023-06-20

## 2023-06-20 VITALS
TEMPERATURE: 98.8 F | SYSTOLIC BLOOD PRESSURE: 110 MMHG | HEIGHT: 68 IN | WEIGHT: 209 LBS | RESPIRATION RATE: 14 BRPM | HEART RATE: 68 BPM | BODY MASS INDEX: 31.67 KG/M2 | DIASTOLIC BLOOD PRESSURE: 80 MMHG | OXYGEN SATURATION: 98 %

## 2023-06-20 PROCEDURE — 00012S: CUSTOM

## 2023-06-20 PROCEDURE — 00011S: CUSTOM

## 2023-06-20 PROCEDURE — 00002S: CUSTOM

## 2023-06-20 PROCEDURE — 00009S: CUSTOM

## 2023-06-20 PROCEDURE — 00035S: CUSTOM

## 2023-06-20 NOTE — REVIEW OF SYSTEMS
[Patient Intake Form Reviewed] : Patient intake form was reviewed [Fatigue] : fatigue [Negative] : Psychiatric [FreeTextEntry2] : Overweight [FreeTextEntry5] : HLD, HTN [FreeTextEntry7] : GERD [FreeTextEntry8] : ED [de-identified] : CLL

## 2023-06-20 NOTE — REVIEW OF SYSTEMS
[Patient Intake Form Reviewed] : Patient intake form was reviewed [Fatigue] : fatigue [Negative] : Psychiatric [FreeTextEntry2] : Overweight [FreeTextEntry5] : HLD, HTN [FreeTextEntry7] : GERD [FreeTextEntry8] : ED [de-identified] : CLL

## 2023-06-20 NOTE — ASSESSMENT
[FreeTextEntry1] : [employer]. Annual PE\par in remission\par  - WNL, Qualified Class A for [employer].

## 2023-06-20 NOTE — HISTORY OF PRESENT ILLNESS
[FreeTextEntry1] : [place] [employer]. Annual PE [de-identified] : [employer] [facility] [employer]. Annual PE

## 2023-06-20 NOTE — HEALTH RISK ASSESSMENT
[Good] : ~his/her~ current health as good [Very Good] : ~his/her~  mood as very good [Yes] : Yes [Monthly or less (1 pt)] : Monthly or less (1 point) [1 or 2 (0 pts)] : 1 or 2 (0 points) [Never (0 pts)] : Never (0 points) [No] : In the past 12 months have you used drugs other than those required for medical reasons? No [No falls in past year] : Patient reported no falls in the past year [0] : 2) Feeling down, depressed, or hopeless: Not at all (0) [PHQ-2 Negative - No further assessment needed] : PHQ-2 Negative - No further assessment needed [Patient declined Low Dose CT Scan] : Patient declined Low Dose CT Scan [No Retinopathy] : No retinopathy [Patient declined mammogram] : Patient declined mammogram [Patient declined PAP Smear] : Patient declined PAP Smear [Patient declined bone density test] : Patient declined bone density test [Patient declined colonoscopy] : Patient declined colonoscopy [HIV test declined] : HIV test declined [Hepatitis C test declined] : Hepatitis C test declined [None] : None [With Significant Other] : lives with significant other [# of Members in Household ___] :  household currently consist of [unfilled] member(s) [Employed] : employed [] :  [Graduate School] : graduate school [# Of Children ___] : has [unfilled] children [Sexually Active] : sexually active [Fully functional (bathing, dressing, toileting, transferring, walking, feeding)] : Fully functional (bathing, dressing, toileting, transferring, walking, feeding) [Feels Safe at Home] : Feels safe at home [Fully functional (using the telephone, shopping, preparing meals, housekeeping, doing laundry, using] : Fully functional and needs no help or supervision to perform IADLs (using the telephone, shopping, preparing meals, housekeeping, doing laundry, using transportation, managing medications and managing finances) [Reports normal functional visual acuity (ie: able to read med bottle)] : Reports normal functional visual acuity [Smoke Detector] : smoke detector [Carbon Monoxide Detector] : carbon monoxide detector [Safety elements used in home] : safety elements used in home [Seat Belt] :  uses seat belt [Sunscreen] : uses sunscreen [Patient/Caregiver not ready to engage] : , patient/caregiver not ready to engage [I will adhere to the patient's wishes.] : I will adhere to the patient's wishes. [Time Spent: ___ minutes] : Time Spent: [unfilled] minutes [Never] : Never [Audit-CScore] : 1 [de-identified] : Average [de-identified] : Average [[facility]] : 7 [ZLD1Stknp] : 0 [LowDoseCTScan] : 03/22 [EyeExamDate] : 03/22 [Change in mental status noted] : No change in mental status noted [Language] : denies difficulty with language [Behavior] : denies difficulty with behavior [Learning/Retaining New Information] : denies difficulty learning/retaining new information [Handling Complex Tasks] : denies difficulty handling complex tasks [Reasoning] : denies difficulty with reasoning [Spatial Ability and Orientation] : denies difficulty with spatial ability and orientation [High Risk Behavior] : no high risk behavior [Reports changes in hearing] : Reports no changes in hearing [Reports changes in vision] : Reports no changes in vision [Reports changes in dental health] : Reports no changes in dental health [Guns at Home] : no guns at home [Travel to Developing Areas] : does not  travel to developing areas [TB Exposure] : is not being exposed to tuberculosis [Caregiver Concerns] : does not have caregiver concerns [MammogramDate] : 03/22 [PapSmearDate] : 03/22 [BoneDensityDate] : 03/22 [HIVDate] : 03/22 [HepatitisCDate] : 03/17 [HepatitisCComments] : Negative [FreeTextEntry2] :  [AdvancecareDate] : 03/22

## 2023-06-20 NOTE — HEALTH RISK ASSESSMENT
[Good] : ~his/her~ current health as good [Very Good] : ~his/her~  mood as very good [Yes] : Yes [Monthly or less (1 pt)] : Monthly or less (1 point) [1 or 2 (0 pts)] : 1 or 2 (0 points) [Never (0 pts)] : Never (0 points) [No] : In the past 12 months have you used drugs other than those required for medical reasons? No [No falls in past year] : Patient reported no falls in the past year [0] : 2) Feeling down, depressed, or hopeless: Not at all (0) [PHQ-2 Negative - No further assessment needed] : PHQ-2 Negative - No further assessment needed [Patient declined Low Dose CT Scan] : Patient declined Low Dose CT Scan [No Retinopathy] : No retinopathy [Patient declined mammogram] : Patient declined mammogram [Patient declined PAP Smear] : Patient declined PAP Smear [Patient declined bone density test] : Patient declined bone density test [Patient declined colonoscopy] : Patient declined colonoscopy [HIV test declined] : HIV test declined [Hepatitis C test declined] : Hepatitis C test declined [None] : None [With Significant Other] : lives with significant other [# of Members in Household ___] :  household currently consist of [unfilled] member(s) [Employed] : employed [Graduate School] : graduate school [] :  [# Of Children ___] : has [unfilled] children [Sexually Active] : sexually active [Fully functional (bathing, dressing, toileting, transferring, walking, feeding)] : Fully functional (bathing, dressing, toileting, transferring, walking, feeding) [Feels Safe at Home] : Feels safe at home [Fully functional (using the telephone, shopping, preparing meals, housekeeping, doing laundry, using] : Fully functional and needs no help or supervision to perform IADLs (using the telephone, shopping, preparing meals, housekeeping, doing laundry, using transportation, managing medications and managing finances) [Reports normal functional visual acuity (ie: able to read med bottle)] : Reports normal functional visual acuity [Smoke Detector] : smoke detector [Carbon Monoxide Detector] : carbon monoxide detector [Safety elements used in home] : safety elements used in home [Seat Belt] :  uses seat belt [Sunscreen] : uses sunscreen [Patient/Caregiver not ready to engage] : , patient/caregiver not ready to engage [I will adhere to the patient's wishes.] : I will adhere to the patient's wishes. [Time Spent: ___ minutes] : Time Spent: [unfilled] minutes [Never] : Never [Audit-CScore] : 1 [de-identified] : Average [de-identified] : Average [[facility]] : 7 [[id number]] : 0 [LowDoseCTScan] : 03/22 [EyeExamDate] : 03/22 [Change in mental status noted] : No change in mental status noted [Language] : denies difficulty with language [Behavior] : denies difficulty with behavior [Learning/Retaining New Information] : denies difficulty learning/retaining new information [Handling Complex Tasks] : denies difficulty handling complex tasks [Reasoning] : denies difficulty with reasoning [Spatial Ability and Orientation] : denies difficulty with spatial ability and orientation [High Risk Behavior] : no high risk behavior [Reports changes in hearing] : Reports no changes in hearing [Reports changes in vision] : Reports no changes in vision [Reports changes in dental health] : Reports no changes in dental health [Guns at Home] : no guns at home [Travel to Developing Areas] : does not  travel to developing areas [TB Exposure] : is not being exposed to tuberculosis [Caregiver Concerns] : does not have caregiver concerns [MammogramDate] : 03/22 [PapSmearDate] : 03/22 [BoneDensityDate] : 03/22 [HIVDate] : 03/22 [HepatitisCDate] : 03/17 [HepatitisCComments] : Negative [FreeTextEntry2] :  [AdvancecareDate] : 03/22

## 2023-06-20 NOTE — ASSESSMENT
[FreeTextEntry1] : [employer]. Annual PE\par in remission\par  - WNL, Qualified Class A for [employer].

## 2023-06-22 ENCOUNTER — EMERGENCY (EMERGENCY)
Facility: HOSPITAL | Age: 63
LOS: 1 days | Discharge: HOME | End: 2023-06-22
Attending: EMERGENCY MEDICINE
Payer: COMMERCIAL

## 2023-06-22 VITALS
HEIGHT: 68 IN | SYSTOLIC BLOOD PRESSURE: 158 MMHG | OXYGEN SATURATION: 98 % | TEMPERATURE: 98 F | HEART RATE: 60 BPM | RESPIRATION RATE: 16 BRPM | WEIGHT: 209 LBS | DIASTOLIC BLOOD PRESSURE: 103 MMHG

## 2023-06-22 DIAGNOSIS — Z98.890 OTHER SPECIFIED POSTPROCEDURAL STATES: Chronic | ICD-10-CM

## 2023-06-22 PROCEDURE — 99283 EMERGENCY DEPT VISIT LOW MDM: CPT

## 2023-06-22 PROCEDURE — 73562 X-RAY EXAM OF KNEE 3: CPT

## 2023-06-22 PROCEDURE — 73562 X-RAY EXAM OF KNEE 3: CPT | Mod: 26,RT

## 2023-06-22 NOTE — ED PROVIDER NOTE - OBJECTIVE STATEMENT
64yo M PMH CLL and HTN presents to ED c/o R knee pain s/p sudden stop while playing pickelball. denies twisting motion or fall. was unable to ambulate immediately after incident. Pt took 1000mg Tylenol 3h ago. denies weakness, numbness, overlying skin changes, foot pain, hip pain, radiation of pain

## 2023-06-22 NOTE — ED ADULT NURSE NOTE - OBJECTIVE STATEMENT
to ED while playing pickleball, heard a "pop" in R knee. states he is unable to bear weight. took 1g tylenol at 1500

## 2023-06-22 NOTE — ED PROVIDER NOTE - PHYSICAL EXAMINATION
General: non-toxic appearing, in no acute distress, A+Ox3  CV: RRR, nl s1/s2.  Resp: CTAB, normal rate and effort  Neuro: sensorimotor intact without deficits   MSK: No joint tenderness to palpation, Full ROM and strength ext x 4. non-ambulatory due to pain. neg lachmans, + varus stress  popliteal pulses intact  Skin: No rashes, lacerations, abrasions, ecchymosis, edema. intact and perfused.

## 2023-06-22 NOTE — ED PROVIDER NOTE - CLINICAL SUMMARY MEDICAL DECISION MAKING FREE TEXT BOX
62yo M p/w R knee pain s/p sudden deceleration motion. on eval, non-ambulatory, full ROM, neuro intact, pulses intact, + varus stress, no joint ttp, no edema or ecchymosis, remainder of PE WNL. ordered XR to f/o fx/DL. most likely ligament tear. referred to ortho. denied pain meds. will wrap knee with ACE bandage. pt came to ED with crutches. Pt educated on proper use of crutches and demonstrated proficient use prior to discharge.     discussed supportive care measures and return precautions. advised to f.u with PCP. pt verbalized understanding and agreement 64yo M p/w R knee pain s/p sudden deceleration motion. on eval, non-ambulatory, full ROM, neuro intact, pulses intact, + varus stress, no joint ttp, no edema or ecchymosis, remainder of PE WNL. ordered XR to f/o fx/DL- neg results- reviewed with pt. most likely ligament tear. referred to ortho. denied pain meds. will wrap knee with ACE bandage. pt came to ED with crutches. Pt educated on proper use of crutches and demonstrated proficient use prior to discharge. discussed supportive care measures and return precautions. advised to f.u with PCP. pt verbalized understanding and agreement

## 2023-06-22 NOTE — ED PROVIDER NOTE - ATTENDING APP SHARED VISIT CONTRIBUTION OF CARE
Pt with R knee pain while playing pickleball during a deceleration movement.  xrays without fracture  plan compressive support and outpt ortho for eval and MRI if indicated

## 2023-06-22 NOTE — ED PROVIDER NOTE - NS ED ATTENDING STATEMENT MOD
This was a shared visit with the DEANN. I reviewed and verified the documentation and independently performed the documented:

## 2023-06-22 NOTE — ED PROVIDER NOTE - CARE PROVIDER_API CALL
Jc Velez  Orthopaedic Surgery  [address] [place] [id number]  Phone: [phone]  Fax: [phone]  Follow Up Time:

## 2023-06-26 ENCOUNTER — RESULT REVIEW (OUTPATIENT)
Age: 63
End: 2023-06-26

## 2023-06-26 ENCOUNTER — APPOINTMENT (OUTPATIENT)
Dept: HEMATOLOGY ONCOLOGY | Facility: CLINIC | Age: 63
End: 2023-06-26
Payer: COMMERCIAL

## 2023-06-26 VITALS
HEART RATE: 68 BPM | RESPIRATION RATE: 16 BRPM | TEMPERATURE: 98 F | WEIGHT: 207.23 LBS | DIASTOLIC BLOOD PRESSURE: 87 MMHG | OXYGEN SATURATION: 98 % | HEIGHT: 67.99 IN | SYSTOLIC BLOOD PRESSURE: 137 MMHG | BODY MASS INDEX: 31.41 KG/M2

## 2023-06-26 LAB
BASOPHILS # BLD AUTO: 0.07 K/UL — SIGNIFICANT CHANGE UP (ref 0–0.2)
BASOPHILS NFR BLD AUTO: 0.8 % — SIGNIFICANT CHANGE UP (ref 0–2)
EOSINOPHIL # BLD AUTO: 0.18 K/UL — SIGNIFICANT CHANGE UP (ref 0–0.5)
EOSINOPHIL NFR BLD AUTO: 2.1 % — SIGNIFICANT CHANGE UP (ref 0–6)
HCT VFR BLD CALC: 45.1 % — SIGNIFICANT CHANGE UP (ref 39–50)
HGB BLD-MCNC: 15.4 G/DL — SIGNIFICANT CHANGE UP (ref 13–17)
IMM GRANULOCYTES NFR BLD AUTO: 0.4 % — SIGNIFICANT CHANGE UP (ref 0–0.9)
LYMPHOCYTES # BLD AUTO: 1.83 K/UL — SIGNIFICANT CHANGE UP (ref 1–3.3)
LYMPHOCYTES # BLD AUTO: 21.4 % — SIGNIFICANT CHANGE UP (ref 13–44)
MCHC RBC-ENTMCNC: 28.9 PG — SIGNIFICANT CHANGE UP (ref 27–34)
MCHC RBC-ENTMCNC: 34.1 G/DL — SIGNIFICANT CHANGE UP (ref 32–36)
MCV RBC AUTO: 84.6 FL — SIGNIFICANT CHANGE UP (ref 80–100)
MONOCYTES # BLD AUTO: 0.79 K/UL — SIGNIFICANT CHANGE UP (ref 0–0.9)
MONOCYTES NFR BLD AUTO: 9.2 % — SIGNIFICANT CHANGE UP (ref 2–14)
NEUTROPHILS # BLD AUTO: 5.66 K/UL — SIGNIFICANT CHANGE UP (ref 1.8–7.4)
NEUTROPHILS NFR BLD AUTO: 66.1 % — SIGNIFICANT CHANGE UP (ref 43–77)
NRBC # BLD: 0 /100 WBCS — SIGNIFICANT CHANGE UP (ref 0–0)
PLATELET # BLD AUTO: 145 K/UL — LOW (ref 150–400)
RBC # BLD: 5.33 M/UL — SIGNIFICANT CHANGE UP (ref 4.2–5.8)
RBC # FLD: 13.8 % — SIGNIFICANT CHANGE UP (ref 10.3–14.5)
WBC # BLD: 8.56 K/UL — SIGNIFICANT CHANGE UP (ref 3.8–10.5)
WBC # FLD AUTO: 8.56 K/UL — SIGNIFICANT CHANGE UP (ref 3.8–10.5)

## 2023-06-26 PROCEDURE — 99213 OFFICE O/P EST LOW 20 MIN: CPT

## 2023-06-28 ENCOUNTER — APPOINTMENT (OUTPATIENT)
Age: 63
End: 2023-06-28
Payer: COMMERCIAL

## 2023-06-28 VITALS
HEIGHT: 67 IN | HEART RATE: 60 BPM | SYSTOLIC BLOOD PRESSURE: 147 MMHG | WEIGHT: 207 LBS | BODY MASS INDEX: 32.49 KG/M2 | DIASTOLIC BLOOD PRESSURE: 90 MMHG

## 2023-06-28 PROCEDURE — 99203 OFFICE O/P NEW LOW 30 MIN: CPT

## 2023-06-28 NOTE — DISCUSSION/SUMMARY
[de-identified] : 63-year-old male with right knee internal derangement.  Due to his pain and history of previous injuries of the knee, we will order physical therapy as well as an MRI of the knee to evaluate for internal derangement.  I would recommend going to physical therapy for active assisted range of motion, passive range of motion, weightbearing as tolerated, gait training, strengthening, no restrictions.  Modalities as indicated.  When the MRI is complete he will give us a call and we will discuss the results and how to proceed at that time.\par \par The patient was given the opportunity to ask questions and all questions were answered to their satisfaction.\par \par Jc Velez MD\par Orthopaedic Trauma Surgeon\par [facility]\par [facility] [place]\par Director Orthopaedic Trauma, [facility]\par \par \par \par

## 2023-06-28 NOTE — HISTORY OF PRESENT ILLNESS
[de-identified] : Patient is a 63-year-old gentleman who presents with his wife today for evaluation of right knee pain.  His wife was previously a patient in the practice as well.  He was playing pickle ball when he suddenly felt a pop in his knee.  He states he has had trouble with this knee in the past with similar problems.  He was seen in the emergency department where x-rays were obtained.  No acute findings were noted but he was referred to come see me.  The patient states the pain is made worse with activity and relieved with rest.  Aching, 4 out of 10.  Concentrated primarily along the medial joint line.  No gross feelings of instability.

## 2023-06-28 NOTE — ASSESSMENT
[FreeTextEntry1] : Mr. Don is a 63 year old male with CLL (diagnosed 2014) on [id number] study (MRD Cohort)-\par in [place] - who presents for follow-up.\par He was unblinded and has been on the ibrutinib arm\par Remains w/o evidence of dz recurrence.\par CBC results reviewed and d/w pt\par WBC 8.56, Hgb 15.4, Plt 145K, ANC 5.66, ALC 1.83\par  \par -continue ibrutinib 420 mg daily \par -says he will have a colonoscopy - he's never had one to date\par -check CMP, LDH, uric acid\par \par RV  in 3 mos

## 2023-06-28 NOTE — PHYSICAL EXAM
[de-identified] : Physical Exam:\par General: Well appearing, no acute distress, A&O\par Neurologic: A&Ox3, No focal deficits\par Head: NCAT without abrasions, lacerations, or ecchymosis to head, face, or scalp\par Respiratory: Equal chest wall expansion bilaterally, no accessory muscle use\par Lymphatic: No lymphadenopathy palpated\par Skin: Warm and dry\par Psychiatric: Normal mood and affect\par \par RLE: SILT s/s/sp/dp/t\par Fires EHL/FHL/GS/TA\par 2+ DP/PT pulse\par brisk capillary refill\par Positive tenderness to palpation medial joint line\par  [de-identified] : Plain films of the right knee were previously obtained and reviewed today.  There is early stage osteoarthritic changes and a questionable loose body in the medial side of the intercondylar notch

## 2023-06-28 NOTE — HISTORY OF PRESENT ILLNESS
[de-identified] : Mr. Don is a 63 year old male with CLL (diagnosed 2014) on [id number] study who presents for follow-up. \par He feels well and has no new complaints.  Pt denies night sweats. Appetite is good. Weight stable. No chest pain or shortness of breath. No palpitations. Energy stable. ROS neg. No diarrhea or constipation. No palpitations, joint pain, muscle spasm. \par He received 2 doses of the COVID vaccine (Moderna)--> he tested positive for antibodies. He received a Moderna booster vaccine.  [de-identified] : CLL [de-identified] : CD38+, ZAP70+\par unmutated IGHV\par TP53 mutation. \par  [de-identified] : CLL, remains on ibrutinib\par tolerating very well\par no constitutional c/o\par CT N/C/A/P - 12/29/22 - stable, minimal possibly insignificant LAD, no splenomegaly\par \par

## 2023-07-02 ENCOUNTER — OUTPATIENT (OUTPATIENT)
Dept: OUTPATIENT SERVICES | Facility: HOSPITAL | Age: 63
LOS: 1 days | End: 2023-07-02
Payer: COMMERCIAL

## 2023-07-02 ENCOUNTER — APPOINTMENT (OUTPATIENT)
Dept: MRI IMAGING | Facility: CLINIC | Age: 63
End: 2023-07-02
Payer: COMMERCIAL

## 2023-07-02 DIAGNOSIS — Z98.890 OTHER SPECIFIED POSTPROCEDURAL STATES: Chronic | ICD-10-CM

## 2023-07-02 DIAGNOSIS — M25.561 PAIN IN RIGHT KNEE: ICD-10-CM

## 2023-07-02 PROCEDURE — 73721 MRI JNT OF LWR EXTRE W/O DYE: CPT

## 2023-07-02 PROCEDURE — 73721 MRI JNT OF LWR EXTRE W/O DYE: CPT | Mod: 26,RT

## 2023-07-12 ENCOUNTER — APPOINTMENT (OUTPATIENT)
Dept: ORTHOPEDIC SURGERY | Facility: CLINIC | Age: 63
End: 2023-07-12
Payer: COMMERCIAL

## 2023-07-12 VITALS
HEART RATE: 60 BPM | WEIGHT: 208 LBS | BODY MASS INDEX: 32.65 KG/M2 | SYSTOLIC BLOOD PRESSURE: 159 MMHG | DIASTOLIC BLOOD PRESSURE: 104 MMHG | HEIGHT: 67 IN | TEMPERATURE: 98.1 F

## 2023-07-12 DIAGNOSIS — M25.561 PAIN IN RIGHT KNEE: ICD-10-CM

## 2023-07-12 PROCEDURE — 99214 OFFICE O/P EST MOD 30 MIN: CPT

## 2023-07-12 NOTE — DISCUSSION/SUMMARY
[de-identified] : Should there63-year-old male with right knee internal derangement.  The MRI does show meniscal injuries but as he is having improvement with physical therapy, I would recommend continuing with nonoperative modalities.  If these are unsuccessful, he may need to be either with corticosteroids or viscosupplementation.  If that is the case we will refer him to the arthroplasty service.  I would recommend going to physical therapy for active assisted range of motion, passive range of motion, weightbearing as tolerated, gait training, strengthening, no restrictions.  Modalities as indicated.  No further orthopedic trauma intervention is required but we will help facilitate future care as needed. The patient may follow up as needed.\par \par The patient was given the opportunity to ask questions and all questions were answered to their satisfaction.\par \par Jc Velez MD\par Orthopaedic Trauma Surgeon\par [facility]\par [facility] [place]\par Director Orthopaedic Trauma, [facility]\par \par \par \par

## 2023-07-12 NOTE — HISTORY OF PRESENT ILLNESS
[de-identified] : Patient is a 63-year-old gentleman who presents with his wife today for follow up evaluation of right knee pain.  His wife was previously a patient in the practice as well.  He was playing pickle ball when he suddenly felt a pop in his knee.  He states he has had trouble with this knee in the past with similar problems.  He was seen in the emergency department where x-rays were obtained.  No acute findings were noted but he was referred to come see me.  The patient states the pain is made worse with activity and relieved with rest.  Aching,2 out of 10.  Concentrated primarily along the medial joint line.  No gross feelings of instability.  Since his last visit he has had an MRI and comes in today for discussion.

## 2023-07-12 NOTE — PHYSICAL EXAM
[de-identified] : Physical Exam:\par General: Well appearing, no acute distress, A&O\par Neurologic: A&Ox3, No focal deficits\par Head: NCAT without abrasions, lacerations, or ecchymosis to head, face, or scalp\par Respiratory: Equal chest wall expansion bilaterally, no accessory muscle use\par Lymphatic: No lymphadenopathy palpated\par Skin: Warm and dry\par Psychiatric: Normal mood and affect\par \par RLE: SILT s/s/sp/dp/t\par Fires EHL/FHL/GS/TA\par 2+ DP/PT pulse\par brisk capillary refill\par Positive tenderness to palpation medial joint line\par  [de-identified] : MRI reviewed.  Results scanned in the chart.   medial and lateral meniscus tears noted in the setting of early arthritic changes.

## 2023-07-12 NOTE — REASON FOR VISIT
[Follow-Up Visit] : a follow-up visit for [Other: ____] : [unfilled] [Spouse] : spouse [FreeTextEntry2] : MRI review.

## 2023-07-24 ENCOUNTER — APPOINTMENT (OUTPATIENT)
Dept: INTERNAL MEDICINE | Facility: CLINIC | Age: 63
End: 2023-07-24
Payer: COMMERCIAL

## 2023-07-24 VITALS
WEIGHT: 204 LBS | DIASTOLIC BLOOD PRESSURE: 87 MMHG | OXYGEN SATURATION: 100 % | BODY MASS INDEX: 32.02 KG/M2 | HEIGHT: 67 IN | SYSTOLIC BLOOD PRESSURE: 143 MMHG | HEART RATE: 58 BPM

## 2023-07-24 PROCEDURE — 99213 OFFICE O/P EST LOW 20 MIN: CPT

## 2023-07-25 NOTE — HISTORY OF PRESENT ILLNESS
[de-identified] : CC: Follow up of Hypertension and other medical problems\par \par HPI:\par This is a 63 year male being seen for evaluation of:\par \par Hypertension:\par Patient is taking his medication properly and is not having any side effects.\par 
I will START or STAY ON the medications listed below when I get home from the hospital:    acetaminophen 325 mg oral tablet  -- 2 tab(s) by mouth every 6 hours, As needed, Mild Pain (1 - 3)  -- Indication: For Pain    morphine  -- 1 milligram(s) intravenous every 3 hours, As Needed  -- Indication: For Pain    amiodarone 200 mg oral tablet  -- 1 tab(s) by mouth once a day  -- Indication: For HF    atorvastatin 40 mg oral tablet  -- 1 tab(s) by mouth once a day (at bedtime)  -- Indication: For HLD    clopidogrel 75 mg oral tablet  -- 1 tab(s) by mouth once a day  -- Indication: For Ventricular bigeminy    ALPRAZolam 0.25 mg oral tablet  -- 1 tab(s) by mouth 3 times a day  -- Indication: For Anxiety    albuterol 90 mcg/inh inhalation aerosol  -- 2 puff(s) inhaled every 6 hours, As needed, Bronchospasm  -- Indication: For SOB    guaiFENesin 100 mg/5 mL oral liquid  -- 5 milliliter(s) by mouth every 6 hours, As needed, Cough  -- Indication: For Cough    vancomycin 750 mg intravenous injection  -- 750 milligram(s) intravenous every 48 hours  -- Indication: For TOE ULCER; RIGHT    gentamicin 0.3% ophthalmic solution  -- 1 drop(s) to each affected eye 4 times a day  -- Indication: For Eye infection    piperacillin-tazobactam 2 g-0.25 g intravenous injection  -- 3.375 milligram(s) intravenous every 8 hours  -- Indication: For TOE ULCER; RIGHT    lactobacillus acidophilus oral capsule  -- 1 cap(s) by mouth 2 times a day  -- Indication: For Preventative    pantoprazole 40 mg oral delayed release tablet  -- 1 tab(s) by mouth once a day (before a meal)  -- Indication: For GERD

## 2023-07-25 NOTE — ASSESSMENT
[FreeTextEntry1] : Hypertension is borderline.  We will continue current medication of amlodipine and losartan for the time being.  Patient will try to lose some weight and watch his salt.\par \par Coloscopy stressed.  Patient says he will do it.\par He is concerned about stopping Imbruvica.\par \par I have spent a total of 25 minutes on the day of the visit both face to face and non-face to face with the patient.\par

## 2023-08-14 LAB
ALBUMIN SERPL ELPH-MCNC: 4.4 G/DL
ALP BLD-CCNC: 86 U/L
ALT SERPL-CCNC: 24 U/L
ANION GAP SERPL CALC-SCNC: 11 MMOL/L
AST SERPL-CCNC: 23 U/L
BILIRUB SERPL-MCNC: 0.3 MG/DL
BUN SERPL-MCNC: 16 MG/DL
CALCIUM SERPL-MCNC: 10 MG/DL
CHLORIDE SERPL-SCNC: 100 MMOL/L
CO2 SERPL-SCNC: 24 MMOL/L
CREAT SERPL-MCNC: 1.14 MG/DL
DEPRECATED KAPPA LC FREE/LAMBDA SER: 1.69 RATIO
EGFR: 72 ML/MIN/1.73M2
GLUCOSE SERPL-MCNC: 102 MG/DL
IGA SER QL IEP: 242 MG/DL
IGG SER QL IEP: 1085 MG/DL
IGM SER QL IEP: 23 MG/DL
KAPPA LC CSF-MCNC: 1.57 MG/DL
KAPPA LC SERPL-MCNC: 2.65 MG/DL
LDH SERPL-CCNC: 164 U/L
POTASSIUM SERPL-SCNC: 4.6 MMOL/L
PROT SERPL-MCNC: 7 G/DL
SODIUM SERPL-SCNC: 136 MMOL/L
URATE SERPL-MCNC: 5.3 MG/DL

## 2023-08-15 ENCOUNTER — OUTPATIENT (OUTPATIENT)
Dept: OUTPATIENT SERVICES | Facility: HOSPITAL | Age: 63
LOS: 1 days | End: 2023-08-15
Payer: COMMERCIAL

## 2023-08-15 ENCOUNTER — APPOINTMENT (OUTPATIENT)
Dept: MRI IMAGING | Facility: CLINIC | Age: 63
End: 2023-08-15
Payer: COMMERCIAL

## 2023-08-15 DIAGNOSIS — N28.9 DISORDER OF KIDNEY AND URETER, UNSPECIFIED: ICD-10-CM

## 2023-08-15 DIAGNOSIS — Z98.890 OTHER SPECIFIED POSTPROCEDURAL STATES: Chronic | ICD-10-CM

## 2023-08-15 PROCEDURE — 74181 MRI ABDOMEN W/O CONTRAST: CPT | Mod: 26

## 2023-08-15 PROCEDURE — 74181 MRI ABDOMEN W/O CONTRAST: CPT

## 2023-09-27 ENCOUNTER — OUTPATIENT (OUTPATIENT)
Dept: OUTPATIENT SERVICES | Facility: HOSPITAL | Age: 63
LOS: 1 days | Discharge: HOME | End: 2023-09-27

## 2023-09-27 DIAGNOSIS — Z98.890 OTHER SPECIFIED POSTPROCEDURAL STATES: Chronic | ICD-10-CM

## 2023-09-27 DIAGNOSIS — D64.9 ANEMIA, UNSPECIFIED: ICD-10-CM

## 2023-10-05 ENCOUNTER — RESULT REVIEW (OUTPATIENT)
Age: 63
End: 2023-10-05

## 2023-10-05 ENCOUNTER — APPOINTMENT (OUTPATIENT)
Dept: HEMATOLOGY ONCOLOGY | Facility: CLINIC | Age: 63
End: 2023-10-05
Payer: COMMERCIAL

## 2023-10-05 VITALS
HEART RATE: 56 BPM | TEMPERATURE: 97.3 F | OXYGEN SATURATION: 99 % | RESPIRATION RATE: 16 BRPM | BODY MASS INDEX: 32.35 KG/M2 | DIASTOLIC BLOOD PRESSURE: 79 MMHG | WEIGHT: 206.57 LBS | SYSTOLIC BLOOD PRESSURE: 131 MMHG

## 2023-10-05 LAB
BASOPHILS # BLD AUTO: 0.09 K/UL — SIGNIFICANT CHANGE UP (ref 0–0.2)
BASOPHILS NFR BLD AUTO: 1.2 % — SIGNIFICANT CHANGE UP (ref 0–2)
EOSINOPHIL # BLD AUTO: 0.22 K/UL — SIGNIFICANT CHANGE UP (ref 0–0.5)
EOSINOPHIL NFR BLD AUTO: 2.9 % — SIGNIFICANT CHANGE UP (ref 0–6)
HCT VFR BLD CALC: 45.1 % — SIGNIFICANT CHANGE UP (ref 39–50)
HGB BLD-MCNC: 14.9 G/DL — SIGNIFICANT CHANGE UP (ref 13–17)
IMM GRANULOCYTES NFR BLD AUTO: 0.7 % — SIGNIFICANT CHANGE UP (ref 0–0.9)
LYMPHOCYTES # BLD AUTO: 1.9 K/UL — SIGNIFICANT CHANGE UP (ref 1–3.3)
LYMPHOCYTES # BLD AUTO: 24.9 % — SIGNIFICANT CHANGE UP (ref 13–44)
MCHC RBC-ENTMCNC: 28.3 PG — SIGNIFICANT CHANGE UP (ref 27–34)
MCHC RBC-ENTMCNC: 33 G/DL — SIGNIFICANT CHANGE UP (ref 32–36)
MCV RBC AUTO: 85.7 FL — SIGNIFICANT CHANGE UP (ref 80–100)
MONOCYTES # BLD AUTO: 0.71 K/UL — SIGNIFICANT CHANGE UP (ref 0–0.9)
MONOCYTES NFR BLD AUTO: 9.3 % — SIGNIFICANT CHANGE UP (ref 2–14)
NEUTROPHILS # BLD AUTO: 4.66 K/UL — SIGNIFICANT CHANGE UP (ref 1.8–7.4)
NEUTROPHILS NFR BLD AUTO: 61 % — SIGNIFICANT CHANGE UP (ref 43–77)
NRBC # BLD: 0 /100 WBCS — SIGNIFICANT CHANGE UP (ref 0–0)
PLATELET # BLD AUTO: 159 K/UL — SIGNIFICANT CHANGE UP (ref 150–400)
RBC # BLD: 5.26 M/UL — SIGNIFICANT CHANGE UP (ref 4.2–5.8)
RBC # FLD: 13.9 % — SIGNIFICANT CHANGE UP (ref 10.3–14.5)
WBC # BLD: 7.63 K/UL — SIGNIFICANT CHANGE UP (ref 3.8–10.5)
WBC # FLD AUTO: 7.63 K/UL — SIGNIFICANT CHANGE UP (ref 3.8–10.5)

## 2023-10-05 PROCEDURE — 99213 OFFICE O/P EST LOW 20 MIN: CPT

## 2023-10-18 ENCOUNTER — NON-APPOINTMENT (OUTPATIENT)
Age: 63
End: 2023-10-18

## 2023-10-19 ENCOUNTER — NON-APPOINTMENT (OUTPATIENT)
Age: 63
End: 2023-10-19

## 2023-12-19 ENCOUNTER — OUTPATIENT (OUTPATIENT)
Dept: OUTPATIENT SERVICES | Facility: HOSPITAL | Age: 63
LOS: 1 days | Discharge: HOME | End: 2023-12-19

## 2023-12-19 DIAGNOSIS — D64.9 ANEMIA, UNSPECIFIED: ICD-10-CM

## 2023-12-19 DIAGNOSIS — Z98.890 OTHER SPECIFIED POSTPROCEDURAL STATES: Chronic | ICD-10-CM

## 2024-01-04 ENCOUNTER — RESULT REVIEW (OUTPATIENT)
Age: 64
End: 2024-01-04

## 2024-01-04 ENCOUNTER — APPOINTMENT (OUTPATIENT)
Dept: HEMATOLOGY ONCOLOGY | Facility: CLINIC | Age: 64
End: 2024-01-04
Payer: COMMERCIAL

## 2024-01-04 VITALS
WEIGHT: 204.81 LBS | TEMPERATURE: 96.4 F | DIASTOLIC BLOOD PRESSURE: 88 MMHG | RESPIRATION RATE: 19 BRPM | HEART RATE: 60 BPM | BODY MASS INDEX: 32.08 KG/M2 | OXYGEN SATURATION: 99 % | SYSTOLIC BLOOD PRESSURE: 144 MMHG

## 2024-01-04 DIAGNOSIS — K21.9 GASTRO-ESOPHAGEAL REFLUX DISEASE W/OUT ESOPHAGITIS: ICD-10-CM

## 2024-01-04 LAB
BASOPHILS # BLD AUTO: 0.11 K/UL — SIGNIFICANT CHANGE UP (ref 0–0.2)
BASOPHILS # BLD AUTO: 0.11 K/UL — SIGNIFICANT CHANGE UP (ref 0–0.2)
BASOPHILS NFR BLD AUTO: 1.3 % — SIGNIFICANT CHANGE UP (ref 0–2)
BASOPHILS NFR BLD AUTO: 1.3 % — SIGNIFICANT CHANGE UP (ref 0–2)
EOSINOPHIL # BLD AUTO: 0.15 K/UL — SIGNIFICANT CHANGE UP (ref 0–0.5)
EOSINOPHIL # BLD AUTO: 0.15 K/UL — SIGNIFICANT CHANGE UP (ref 0–0.5)
EOSINOPHIL NFR BLD AUTO: 1.8 % — SIGNIFICANT CHANGE UP (ref 0–6)
EOSINOPHIL NFR BLD AUTO: 1.8 % — SIGNIFICANT CHANGE UP (ref 0–6)
HCT VFR BLD CALC: 44.9 % — SIGNIFICANT CHANGE UP (ref 39–50)
HCT VFR BLD CALC: 44.9 % — SIGNIFICANT CHANGE UP (ref 39–50)
HGB BLD-MCNC: 15.4 G/DL — SIGNIFICANT CHANGE UP (ref 13–17)
HGB BLD-MCNC: 15.4 G/DL — SIGNIFICANT CHANGE UP (ref 13–17)
IMM GRANULOCYTES NFR BLD AUTO: 0.7 % — SIGNIFICANT CHANGE UP (ref 0–0.9)
IMM GRANULOCYTES NFR BLD AUTO: 0.7 % — SIGNIFICANT CHANGE UP (ref 0–0.9)
LYMPHOCYTES # BLD AUTO: 1.84 K/UL — SIGNIFICANT CHANGE UP (ref 1–3.3)
LYMPHOCYTES # BLD AUTO: 1.84 K/UL — SIGNIFICANT CHANGE UP (ref 1–3.3)
LYMPHOCYTES # BLD AUTO: 22.3 % — SIGNIFICANT CHANGE UP (ref 13–44)
LYMPHOCYTES # BLD AUTO: 22.3 % — SIGNIFICANT CHANGE UP (ref 13–44)
MCHC RBC-ENTMCNC: 29.1 PG — SIGNIFICANT CHANGE UP (ref 27–34)
MCHC RBC-ENTMCNC: 29.1 PG — SIGNIFICANT CHANGE UP (ref 27–34)
MCHC RBC-ENTMCNC: 34.3 G/DL — SIGNIFICANT CHANGE UP (ref 32–36)
MCHC RBC-ENTMCNC: 34.3 G/DL — SIGNIFICANT CHANGE UP (ref 32–36)
MCV RBC AUTO: 84.9 FL — SIGNIFICANT CHANGE UP (ref 80–100)
MCV RBC AUTO: 84.9 FL — SIGNIFICANT CHANGE UP (ref 80–100)
MONOCYTES # BLD AUTO: 0.65 K/UL — SIGNIFICANT CHANGE UP (ref 0–0.9)
MONOCYTES # BLD AUTO: 0.65 K/UL — SIGNIFICANT CHANGE UP (ref 0–0.9)
MONOCYTES NFR BLD AUTO: 7.9 % — SIGNIFICANT CHANGE UP (ref 2–14)
MONOCYTES NFR BLD AUTO: 7.9 % — SIGNIFICANT CHANGE UP (ref 2–14)
NEUTROPHILS # BLD AUTO: 5.44 K/UL — SIGNIFICANT CHANGE UP (ref 1.8–7.4)
NEUTROPHILS # BLD AUTO: 5.44 K/UL — SIGNIFICANT CHANGE UP (ref 1.8–7.4)
NEUTROPHILS NFR BLD AUTO: 66 % — SIGNIFICANT CHANGE UP (ref 43–77)
NEUTROPHILS NFR BLD AUTO: 66 % — SIGNIFICANT CHANGE UP (ref 43–77)
NRBC # BLD: 0 /100 WBCS — SIGNIFICANT CHANGE UP (ref 0–0)
NRBC # BLD: 0 /100 WBCS — SIGNIFICANT CHANGE UP (ref 0–0)
PLATELET # BLD AUTO: 158 K/UL — SIGNIFICANT CHANGE UP (ref 150–400)
PLATELET # BLD AUTO: 158 K/UL — SIGNIFICANT CHANGE UP (ref 150–400)
RBC # BLD: 5.29 M/UL — SIGNIFICANT CHANGE UP (ref 4.2–5.8)
RBC # BLD: 5.29 M/UL — SIGNIFICANT CHANGE UP (ref 4.2–5.8)
RBC # FLD: 13.3 % — SIGNIFICANT CHANGE UP (ref 10.3–14.5)
RBC # FLD: 13.3 % — SIGNIFICANT CHANGE UP (ref 10.3–14.5)
WBC # BLD: 8.25 K/UL — SIGNIFICANT CHANGE UP (ref 3.8–10.5)
WBC # BLD: 8.25 K/UL — SIGNIFICANT CHANGE UP (ref 3.8–10.5)
WBC # FLD AUTO: 8.25 K/UL — SIGNIFICANT CHANGE UP (ref 3.8–10.5)
WBC # FLD AUTO: 8.25 K/UL — SIGNIFICANT CHANGE UP (ref 3.8–10.5)

## 2024-01-04 PROCEDURE — 99213 OFFICE O/P EST LOW 20 MIN: CPT

## 2024-01-04 NOTE — HISTORY OF PRESENT ILLNESS
[de-identified] : Mr. Don is a 63 year old male with CLL (diagnosed 2014) on [id number] study who presents for follow-up.  CLL is CD38+, ZAP-70+, unmut IGVH, TP53 mut. On MRD cohort of study, ibrutinib vs placebo. Unblinded 2/2023; was on ibrutinib, now on commercial drug.  He feels well and has no new complaints.   He received 2 doses of the COVID vaccine (Moderna)--> he tested positive for antibodies. He received a Moderna booster vaccine.   [de-identified] : CLL [de-identified] : CD38+, ZAP70+\par  unmutated IGHV\par  TP53 mutation. \par   [de-identified] : CLL, remains on ibrutinib 420 mg/d tolerating very well no constitutional c/o no resp infections, cardiac c/o doing very well CT N/C/A/P - 12/29/22 - stable, minimal possibly insignificant LAD, no splenomegaly MRI 8/15/23 -  Innumerable right renal cysts, some of which demonstrate varying degrees of hemorrhage.  While these are incompletely characterized without intravenous contrast, there are no signal  characteristics to suggest a solid renal mass.  No  c/o  HTN controlled on amlodipine 5 mg and losartan 100 mg/d

## 2024-01-04 NOTE — ASSESSMENT
[FreeTextEntry1] : Mr. Don is a 63 year old male with CLL (diagnosed 2014) on [id number] study (MRD Cohort)- in [place]. Trial was unblinded and he has been on the ibrutinib arm no significant therapy-related toxicityHTN well controlled on losartan and incr dose of amlodipine Remains w/o evidence of dz recurrence, on daily ibrutinib. no constitutional c/o CBC results reviewed and d/w pt WBC 8.25, Hgb 15.4, Plt 158K, ANC 4.66, ALC 1.90  continue ibrutinib 420 mg daily Patient reconsented to ICF version L. Copy of consent form provided to pt. All questions answered to his satisfaction.  He again says that he will have a colonoscopy - he's never had one to date  -check CMP, LDH, uric acid, Igs  RV in 3 mos.

## 2024-01-04 NOTE — REASON FOR VISIT
Speech Language Cognitive Eval Entered On:  3/3/2018 12:36     Performed On:  3/3/2018 11:35  by Aliza Gambino               Patient History   Therapy Benefits/Risks Discussed :   Yes   Diagnosis and Notes :   Pt attends an Aphasia group at [facility] for 3 hours, once a week.     Chart/Pertinent Medical Hx Reviewed :   Yes   Patient's Prior Level of Function Per :   Other: friend   Living Arrangements :   Other: Lives with neighbor   Work History :   No   Communication Skills Prior :   Needs assistance with communication, Memory deficits   Primary Language :   English   Maki Aliza RACHEL - 3/3/2018 11:35    Patient Assessment   Orientation :   Disoriented x 3   ST Visual Fields :   Impaired   Visual Aids :   Glasses   ST Hearing :   Intact   Pain Symptoms :   No   Oxygen Therapy :   Nasal cannula   ST Surgical Drains/Tubes :   IV   Maki Aliza RACHEL - 3/3/2018 11:35    Oral Motor Exam   ST Oral Motor :   Not Impaired   ST Speech Production :   Within normal limits   Maki Aliza RACHEL - 3/3/2018 11:35    Testing Conditions   Reason for Exam SLC :   Evaluate Speech and Language Skills, Evaluate Cognitive Skills   ST Test Conditions :   Agitated/Combative, Alert, Easily Distracted   Standardized Tests Admin :   [facility] Speech - Language - Cognitive Eval   Aliza Gambino VIRGINIE - 3/3/2018 11:35    Speech Language Cognitive Eval   Receptive Language :   Test   Expressive Language :   Test   Cognitive Function :   Test   Maki Aliza RACHEL - 3/3/2018 11:35    Receptive Language   Body Part Identification :   0 %   Right/Left Discrimination :   0 %   Simple Yes/No Questions :   0 %   Complex Yes/No Questions :   0 %   1 Step Commands :   0 %   Multiple Step Commands :   0 %   Auditory Processing Speed :   Decreased   Premorbid Status :   Books   Patient Wears Glasses :   Yes   Aliza Gambino - 3/3/2018 11:35    Expressive Language   Automatic Sequences :   0 %   Sentence Completion :   0 %   Simple Responsive Naming :   0  %   Complex Responsive Naming :   0 %   Simple Visual Conf Naming :   0 %   Complex Visual Conf Naming :   0 %   Spontaneous Speech :   Simple sentences, confused speech (e.g. \"It's not on the top\" when pointing to her mouth, but nothing was inside)   Aliza Gambino - 3/3/2018 11:35    Assessment   Cognitive Deficits :   Yes   Assessment :   Pt presents with a profound expressive/receptive aphasia at this time. Per close family friend, pt has been going to an Aphasia group at [facility] for 3 hours once per week, but that her deficits have been mainly with occasional word-finding and difficulty with numbers. Currently pt is unable to complete simple confrontation naming tasks with objects. Pt was able to repeat \"water\" following max cues from therapist, but was unable to repeat any other words during the evaluation. Pt also is unable to complete simple 1-step directions given maximum cues and hand-over-hand support. Pt perseverated on tasks and carried this over to the next task (e.g. writing with a pen, pt then tried to write with her spoon and would not use it to eat food). Overall, current cognitive skills put pt at high risk for aspiration. Will observe pt during meal to observe safest diet.     Time in room: 11:40-12:20     Aliza Gambino - 3/3/2018 11:35    Assessment: Ratings from FCM Scale   Auditory Comprehension :   1: Severe   Reading :   0: Not Applicable/Not Assessed   Oral Expression :   1: Severe   Writing :   1: Severe   Attention :   3: Moderate   Memory :   1: Severe   Problem Solving :   0: Not Applicable/Not Assessed   Aliza Gambino - 3/3/2018 11:35    Recommendations and Goals   Additional Recommendation SLC :   Acute rehab   Results Discussed With :   Patient, Nurse, Other: friend   Treatment Plan SLC :   Speech Language Cognitive Therapy   Nurse name :   Aretha SKINNER Frequency :   Daily x 5 days per week   Rehab Potential :   Guarded   Aliza Gambino - 3/3/2018 11:35    ST Short Term  Goals SLC Grid   Problem :    Aphasia   Aphasia           Goals :    Pt will complete verbal repetition task given 1 object in front of her with 50% accuracy.   Pt will follow 1-step direction for simple activities of daily living with 50% accuracy.           Cues needed :    Maximum   Maximum           Timeframe :    5-7 days   5-7 days             Aliza Gambino - 3/3/2018 11:35   Aliza Gambino 3/3/2018 11:35         Education   Patient/Family Education :   Yes   Aliza Gambino 3/3/2018 11:35    General Patient Education [employer]   Topics :    Communication              Individuals Taught :    Patient, Friend              Patient Family Preference :    Explanation, Printed materials              Teaching Evaluation :    Needs further teaching, Needs practice/supervision, Needs reinforcement                Aliza Gambino - 3/3/2018 11:35          Learning Style Preference Adult Grid   Patient :   Verbal explanation, Demonstration   Family :   None   Aliza Gambino 3/3/2018 11:35    Barriers to Learning :   Cognitive deficit   Language for Written Material :   English   Aliza Gambino 3/3/2018 11:35            [Follow-Up Visit] : a follow-up visit for [CLL] : chronic lymphocytic leukemia

## 2024-01-29 ENCOUNTER — APPOINTMENT (OUTPATIENT)
Dept: INTERNAL MEDICINE | Facility: CLINIC | Age: 64
End: 2024-01-29
Payer: COMMERCIAL

## 2024-01-29 ENCOUNTER — LABORATORY RESULT (OUTPATIENT)
Age: 64
End: 2024-01-29

## 2024-01-29 VITALS
OXYGEN SATURATION: 99 % | SYSTOLIC BLOOD PRESSURE: 133 MMHG | BODY MASS INDEX: 30.76 KG/M2 | HEART RATE: 67 BPM | WEIGHT: 196 LBS | DIASTOLIC BLOOD PRESSURE: 74 MMHG | HEIGHT: 67 IN

## 2024-01-29 PROCEDURE — G2211 COMPLEX E/M VISIT ADD ON: CPT

## 2024-01-29 PROCEDURE — 36415 COLL VENOUS BLD VENIPUNCTURE: CPT

## 2024-01-29 PROCEDURE — 99214 OFFICE O/P EST MOD 30 MIN: CPT

## 2024-01-29 NOTE — PHYSICAL EXAM
[No Acute Distress] : no acute distress [Well Nourished] : well nourished [No Respiratory Distress] : no respiratory distress  [No Accessory Muscle Use] : no accessory muscle use

## 2024-01-30 LAB
ALBUMIN SERPL ELPH-MCNC: 4.5 G/DL
ALP BLD-CCNC: 105 U/L
ALT SERPL-CCNC: 16 U/L
ANION GAP SERPL CALC-SCNC: 13 MMOL/L
AST SERPL-CCNC: 20 U/L
BASOPHILS # BLD AUTO: 0.09 K/UL
BASOPHILS NFR BLD AUTO: 0.9 %
BILIRUB SERPL-MCNC: 0.6 MG/DL
BUN SERPL-MCNC: 16 MG/DL
CALCIUM SERPL-MCNC: 9.6 MG/DL
CHLORIDE SERPL-SCNC: 97 MMOL/L
CHOLEST SERPL-MCNC: 198 MG/DL
CO2 SERPL-SCNC: 23 MMOL/L
CREAT SERPL-MCNC: 1.09 MG/DL
EGFR: 76 ML/MIN/1.73M2
EOSINOPHIL # BLD AUTO: 0.13 K/UL
EOSINOPHIL NFR BLD AUTO: 1.3 %
ESTIMATED AVERAGE GLUCOSE: 123 MG/DL
GLUCOSE SERPL-MCNC: 85 MG/DL
HBA1C MFR BLD HPLC: 5.9 %
HCT VFR BLD CALC: 48.7 %
HDLC SERPL-MCNC: 51 MG/DL
HGB BLD-MCNC: 15.6 G/DL
IMM GRANULOCYTES NFR BLD AUTO: 0.5 %
LDLC SERPL CALC-MCNC: 128 MG/DL
LYMPHOCYTES # BLD AUTO: 1.53 K/UL
LYMPHOCYTES NFR BLD AUTO: 15.7 %
MAN DIFF?: NORMAL
MCHC RBC-ENTMCNC: 27.9 PG
MCHC RBC-ENTMCNC: 32 GM/DL
MCV RBC AUTO: 87.1 FL
MONOCYTES # BLD AUTO: 0.9 K/UL
MONOCYTES NFR BLD AUTO: 9.2 %
NEUTROPHILS # BLD AUTO: 7.06 K/UL
NEUTROPHILS NFR BLD AUTO: 72.4 %
NONHDLC SERPL-MCNC: 147 MG/DL
PLATELET # BLD AUTO: 170 K/UL
POTASSIUM SERPL-SCNC: 4.2 MMOL/L
PROT SERPL-MCNC: 7.2 G/DL
PSA SERPL-MCNC: 4.43 NG/ML
RBC # BLD: 5.59 M/UL
RBC # FLD: 13.9 %
SODIUM SERPL-SCNC: 132 MMOL/L
TRIGL SERPL-MCNC: 101 MG/DL
WBC # FLD AUTO: 9.76 K/UL

## 2024-01-30 NOTE — HISTORY OF PRESENT ILLNESS
[de-identified] :   CC: Follow up visit for evaluation of the patient's hypertension and any other medical issues that are pertinent.   HPI: This is a 64 year male being seen for evaluation of:   Hypertension: Patient is taking his medication properly and is not having any side effects.  CLL: I read the note of Dr. Smith and patient appears to be doing well.

## 2024-01-30 NOTE — ASSESSMENT
[FreeTextEntry1] : The patient's blood pressure is under reasonable control.  Space he was told to continue his amlodipine 5 mg once a day and losartan 100 mg once a day.  I will get a CBC, CMP and lipid profile today.  His CLL seems to be in remission.  I have spent a total of 35 minutes on the day of the visit both face to face and non-face to face with the patient.

## 2024-02-28 RX ORDER — IBRUTINIB 140 MG/1
140 CAPSULE ORAL DAILY
Qty: 90 | Refills: 6 | Status: ACTIVE | COMMUNITY
Start: 2023-02-03 | End: 1900-01-01

## 2024-03-06 LAB
SODIUM ?TM SUB UR QN: 125 MMOL/L
SODIUM SERPL-SCNC: 137 MMOL/L

## 2024-03-11 ENCOUNTER — NON-APPOINTMENT (OUTPATIENT)
Age: 64
End: 2024-03-11

## 2024-03-11 ENCOUNTER — APPOINTMENT (OUTPATIENT)
Dept: CARDIOLOGY | Facility: CLINIC | Age: 64
End: 2024-03-11
Payer: COMMERCIAL

## 2024-03-11 VITALS
DIASTOLIC BLOOD PRESSURE: 80 MMHG | HEART RATE: 57 BPM | SYSTOLIC BLOOD PRESSURE: 124 MMHG | WEIGHT: 201 LBS | HEIGHT: 67 IN | OXYGEN SATURATION: 99 % | BODY MASS INDEX: 31.55 KG/M2

## 2024-03-11 VITALS — SYSTOLIC BLOOD PRESSURE: 132 MMHG | DIASTOLIC BLOOD PRESSURE: 80 MMHG

## 2024-03-11 DIAGNOSIS — R06.09 OTHER FORMS OF DYSPNEA: ICD-10-CM

## 2024-03-11 DIAGNOSIS — I25.84 ATHEROSCLEROTIC HEART DISEASE OF NATIVE CORONARY ARTERY W/OUT ANGINA PECTORIS: ICD-10-CM

## 2024-03-11 DIAGNOSIS — E78.5 HYPERLIPIDEMIA, UNSPECIFIED: ICD-10-CM

## 2024-03-11 DIAGNOSIS — I10 ESSENTIAL (PRIMARY) HYPERTENSION: ICD-10-CM

## 2024-03-11 DIAGNOSIS — I25.10 ATHEROSCLEROTIC HEART DISEASE OF NATIVE CORONARY ARTERY W/OUT ANGINA PECTORIS: ICD-10-CM

## 2024-03-11 PROCEDURE — 93000 ELECTROCARDIOGRAM COMPLETE: CPT

## 2024-03-11 PROCEDURE — 99204 OFFICE O/P NEW MOD 45 MIN: CPT | Mod: 25

## 2024-03-11 NOTE — HISTORY OF PRESENT ILLNESS
[FreeTextEntry1] : Dear Kalpana, Thank you for referring him back for cardiovascular evaluation. He is a 64-year-old with a history  Of hypertension and CLL as well as a former smoker who has noted some increasing shortness of breath with exertion over the past 6 months or so.  He is also concerned (as is his wife) by his family history of vascular disease and strokes. He has been under therapy for his CLL for years with Imbruvica. Most recent LDL was 120 mg/dL. Of note, his brother recently had an ascending aortic aneurysm requiring urgent surgical repair. CT scan from 2022 Was reviewed and noted significant calcification Of the coronary arteries in particular the LAD territory. We reviewed his prior stress test and nuclear stress test.  Prior: Dealalita Joshua, Thank you for referring him for cardiovascular evaluation.  He is a 59-year-old with a history of hypertension who is currently under treatment for CLL who presents to my office with an abnormal ECG.  He was seen in your office for routine medical evaluation and screening ECG showed T wave inversions in the anterior lateral leads.  The patient reports no chest pains or shortness of breath and exercises on a bicycle for about an hour covering approximately 8 miles without difficulty.  He stopped running because of foot pain, which has now resolved. He has no known history of coronary artery disease, diabetes mellitus or hypercholesterolemia.  He does have a family history of premature coronary artery disease and quit smoking over 30 years ago. He was recently observed in an emergency room for 24 hours for an episode of right arm numbness and dizziness.  CNS work-up including MRI/MRA was reportedly negative.  He has a remote history of gastric or esophageal  ulcer.

## 2024-03-11 NOTE — DISCUSSION/SUMMARY
[FreeTextEntry1] : He is a 64-year-old with a history of hypertension, family history of premature coronary artery disease and former smoker who presents with Exertional shortness of breath. ECG shows sinus rhythm with lateral T wave inversions that are similar to prior. I have suggested that he undergo stress echocardiography in order to exclude ischemic heart disease as a source of his exertional dyspnea and ECG abnormality. An echocardiogram will exclude any structural heart disease that may explain his T wave abnormalities and symptoms. Coronary calcification: We discussed the need for more aggressive medical therapy including aspirin and statin therapy for which I prescribed both. Goal LDL less than 70 mg/dL. Hypertension: Well-controlled on his current regimen.  No changes are made. If no significant abnormalities are found routine aerobic activity And weight loss will be helpful.  [EKG obtained to assist in diagnosis and management of assessed problem(s)] : EKG obtained to assist in diagnosis and management of assessed problem(s)

## 2024-03-11 NOTE — REVIEW OF SYSTEMS
[Negative] : Heme/Lymph [Dyspnea on exertion] : not dyspnea during exertion [Lower Ext Edema] : no extremity edema [Palpitations] : no palpitations [Syncope] : no syncope

## 2024-03-12 RX ORDER — ATORVASTATIN CALCIUM 20 MG/1
20 TABLET, FILM COATED ORAL
Qty: 90 | Refills: 3 | Status: ACTIVE | COMMUNITY
Start: 2024-03-11

## 2024-03-13 ENCOUNTER — RX RENEWAL (OUTPATIENT)
Age: 64
End: 2024-03-13

## 2024-03-13 RX ORDER — AMLODIPINE BESYLATE 5 MG/1
5 TABLET ORAL
Qty: 90 | Refills: 3 | Status: ACTIVE | COMMUNITY
Start: 2019-11-25 | End: 1900-01-01

## 2024-03-13 NOTE — ED CDU PROVIDER INITIAL DAY NOTE - CROS ED NEURO POS
[FreeTextEntry1] : 55F with pmhx of known left cerebellar cavernoma dx in 2013 stable on imaging since 2015. Also with right sided asymmetry/numbness/pain over the same time period. 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 I explained nothing to do at this time for these other than serial imaging in 2 years. Will refer for repeat MRI of cervical spine and neurology evaluation for persistent right sided issues. Will return to clinic after MRI cervical to discuss.   Dr. D'Amico independently reviewed all available images with patient.   PLAN: - MRI cervical spine - Referral to neurologist - Repeat MRI brain 2 years  - RTC after MRI cervical for review    Patient verbalizes understanding of today's discussion and next steps in treatment plan.     A total of 45 minutes was spent reviewing the labs, imaging and physical examination of the patient. We discussed the diagnosis, and the plan. The patient's questions were answered. The patient demonstrated an excellent understanding of the plan. 
DIZZINESS, WEAKNESS, NUMBNESS

## 2024-03-17 ENCOUNTER — RX RENEWAL (OUTPATIENT)
Age: 64
End: 2024-03-17

## 2024-03-17 RX ORDER — LOSARTAN POTASSIUM 100 MG/1
100 TABLET, FILM COATED ORAL
Qty: 90 | Refills: 3 | Status: ACTIVE | COMMUNITY
Start: 2020-10-07 | End: 1900-01-01

## 2024-03-26 ENCOUNTER — OUTPATIENT (OUTPATIENT)
Dept: OUTPATIENT SERVICES | Facility: HOSPITAL | Age: 64
LOS: 1 days | Discharge: HOME | End: 2024-03-26

## 2024-03-26 DIAGNOSIS — Z98.890 OTHER SPECIFIED POSTPROCEDURAL STATES: Chronic | ICD-10-CM

## 2024-03-26 DIAGNOSIS — D64.9 ANEMIA, UNSPECIFIED: ICD-10-CM

## 2024-03-27 DIAGNOSIS — R94.31 ABNORMAL ELECTROCARDIOGRAM [ECG] [EKG]: ICD-10-CM

## 2024-04-02 ENCOUNTER — APPOINTMENT (OUTPATIENT)
Dept: CARDIOLOGY | Facility: CLINIC | Age: 64
End: 2024-04-02
Payer: COMMERCIAL

## 2024-04-02 PROCEDURE — 93351 STRESS TTE COMPLETE: CPT

## 2024-04-04 ENCOUNTER — APPOINTMENT (OUTPATIENT)
Dept: CT IMAGING | Facility: IMAGING CENTER | Age: 64
End: 2024-04-04
Payer: COMMERCIAL

## 2024-04-04 ENCOUNTER — APPOINTMENT (OUTPATIENT)
Dept: HEMATOLOGY ONCOLOGY | Facility: CLINIC | Age: 64
End: 2024-04-04
Payer: COMMERCIAL

## 2024-04-04 ENCOUNTER — RESULT REVIEW (OUTPATIENT)
Age: 64
End: 2024-04-04

## 2024-04-04 ENCOUNTER — OUTPATIENT (OUTPATIENT)
Dept: OUTPATIENT SERVICES | Facility: HOSPITAL | Age: 64
LOS: 1 days | End: 2024-04-04
Payer: COMMERCIAL

## 2024-04-04 ENCOUNTER — APPOINTMENT (OUTPATIENT)
Dept: INTERNAL MEDICINE | Facility: CLINIC | Age: 64
End: 2024-04-04
Payer: COMMERCIAL

## 2024-04-04 VITALS
SYSTOLIC BLOOD PRESSURE: 118 MMHG | RESPIRATION RATE: 16 BRPM | WEIGHT: 194.89 LBS | OXYGEN SATURATION: 97 % | TEMPERATURE: 97.7 F | HEART RATE: 78 BPM | DIASTOLIC BLOOD PRESSURE: 74 MMHG | BODY MASS INDEX: 30.52 KG/M2

## 2024-04-04 VITALS
HEIGHT: 67 IN | BODY MASS INDEX: 30.61 KG/M2 | TEMPERATURE: 98 F | SYSTOLIC BLOOD PRESSURE: 117 MMHG | DIASTOLIC BLOOD PRESSURE: 77 MMHG | WEIGHT: 195 LBS | OXYGEN SATURATION: 98 % | HEART RATE: 76 BPM

## 2024-04-04 DIAGNOSIS — R10.84 GENERALIZED ABDOMINAL PAIN: ICD-10-CM

## 2024-04-04 DIAGNOSIS — R10.10 UPPER ABDOMINAL PAIN, UNSPECIFIED: ICD-10-CM

## 2024-04-04 DIAGNOSIS — Z51.11 ENCOUNTER FOR ANTINEOPLASTIC CHEMOTHERAPY: ICD-10-CM

## 2024-04-04 DIAGNOSIS — Z98.890 OTHER SPECIFIED POSTPROCEDURAL STATES: Chronic | ICD-10-CM

## 2024-04-04 DIAGNOSIS — C91.10 CHRONIC LYMPHOCYTIC LEUKEMIA OF B-CELL TYPE NOT HAVING ACHIEVED REMISSION: ICD-10-CM

## 2024-04-04 DIAGNOSIS — N28.1 CYST OF KIDNEY, ACQUIRED: ICD-10-CM

## 2024-04-04 DIAGNOSIS — N28.9 DISORDER OF KIDNEY AND URETER, UNSPECIFIED: ICD-10-CM

## 2024-04-04 LAB
BASOPHILS # BLD AUTO: 0.1 K/UL — SIGNIFICANT CHANGE UP (ref 0–0.2)
BASOPHILS NFR BLD AUTO: 0.9 % — SIGNIFICANT CHANGE UP (ref 0–2)
BILIRUB UR QL STRIP: NORMAL
CLARITY UR: NORMAL
COLLECTION METHOD: NORMAL
EOSINOPHIL # BLD AUTO: 0.32 K/UL — SIGNIFICANT CHANGE UP (ref 0–0.5)
EOSINOPHIL NFR BLD AUTO: 2.8 % — SIGNIFICANT CHANGE UP (ref 0–6)
GLUCOSE UR-MCNC: NEGATIVE
HCG UR QL: 1 EU/DL
HCT VFR BLD CALC: 46.1 % — SIGNIFICANT CHANGE UP (ref 39–50)
HGB BLD-MCNC: 15.7 G/DL — SIGNIFICANT CHANGE UP (ref 13–17)
HGB UR QL STRIP.AUTO: NEGATIVE
IMM GRANULOCYTES NFR BLD AUTO: 0.4 % — SIGNIFICANT CHANGE UP (ref 0–0.9)
KETONES UR-MCNC: NEGATIVE
LEUKOCYTE ESTERASE UR QL STRIP: NEGATIVE
LYMPHOCYTES # BLD AUTO: 1.74 K/UL — SIGNIFICANT CHANGE UP (ref 1–3.3)
LYMPHOCYTES # BLD AUTO: 15.3 % — SIGNIFICANT CHANGE UP (ref 13–44)
MCHC RBC-ENTMCNC: 29 PG — SIGNIFICANT CHANGE UP (ref 27–34)
MCHC RBC-ENTMCNC: 34.1 G/DL — SIGNIFICANT CHANGE UP (ref 32–36)
MCV RBC AUTO: 85.1 FL — SIGNIFICANT CHANGE UP (ref 80–100)
MONOCYTES # BLD AUTO: 1.41 K/UL — HIGH (ref 0–0.9)
MONOCYTES NFR BLD AUTO: 12.4 % — SIGNIFICANT CHANGE UP (ref 2–14)
NEUTROPHILS # BLD AUTO: 7.76 K/UL — HIGH (ref 1.8–7.4)
NEUTROPHILS NFR BLD AUTO: 68.2 % — SIGNIFICANT CHANGE UP (ref 43–77)
NITRITE UR QL STRIP: POSITIVE
NRBC # BLD: 0 /100 WBCS — SIGNIFICANT CHANGE UP (ref 0–0)
PH UR STRIP: 5.5
PLATELET # BLD AUTO: 184 K/UL — SIGNIFICANT CHANGE UP (ref 150–400)
PROT UR STRIP-MCNC: 30
RBC # BLD: 5.42 M/UL — SIGNIFICANT CHANGE UP (ref 4.2–5.8)
RBC # FLD: 13.7 % — SIGNIFICANT CHANGE UP (ref 10.3–14.5)
SP GR UR STRIP: 1.02
WBC # BLD: 11.38 K/UL — HIGH (ref 3.8–10.5)
WBC # FLD AUTO: 11.38 K/UL — HIGH (ref 3.8–10.5)

## 2024-04-04 PROCEDURE — G2211 COMPLEX E/M VISIT ADD ON: CPT

## 2024-04-04 PROCEDURE — 99214 OFFICE O/P EST MOD 30 MIN: CPT

## 2024-04-04 PROCEDURE — 74176 CT ABD & PELVIS W/O CONTRAST: CPT

## 2024-04-04 PROCEDURE — 74176 CT ABD & PELVIS W/O CONTRAST: CPT | Mod: 26

## 2024-04-04 PROCEDURE — 36415 COLL VENOUS BLD VENIPUNCTURE: CPT

## 2024-04-04 PROCEDURE — 81003 URINALYSIS AUTO W/O SCOPE: CPT | Mod: QW

## 2024-04-04 NOTE — HISTORY OF PRESENT ILLNESS
[FreeTextEntry8] : 64 year old male who is here today with c/o abdominal pain and possible urinary symptoms.  Had the GI bug a few weeks ago. Then 2-3 nights ago He was laying on one side and had Abdominal pain and bloody urine. Felt feverish a few days ago.   Normal BMs  The pain in the abdomen is tender and hurts with movement as well.

## 2024-04-04 NOTE — PHYSICAL EXAM
[Normal] : affect was normal and insight and judgment were intact [de-identified] : severe tenderness in ruq

## 2024-04-04 NOTE — ASSESSMENT
[FreeTextEntry1] : abdominal pain:  will send stat labs stat CT abdomen pelvis r/o acute cholycystitis vs cbd stone

## 2024-04-05 ENCOUNTER — INPATIENT (INPATIENT)
Facility: HOSPITAL | Age: 64
LOS: 2 days | Discharge: HOME | DRG: 445 | End: 2024-04-08
Attending: SURGERY | Admitting: SURGERY
Payer: COMMERCIAL

## 2024-04-05 VITALS
DIASTOLIC BLOOD PRESSURE: 78 MMHG | OXYGEN SATURATION: 98 % | WEIGHT: 197.31 LBS | RESPIRATION RATE: 20 BRPM | HEART RATE: 69 BPM | HEIGHT: 67 IN | SYSTOLIC BLOOD PRESSURE: 132 MMHG | TEMPERATURE: 98 F

## 2024-04-05 DIAGNOSIS — Z98.890 OTHER SPECIFIED POSTPROCEDURAL STATES: Chronic | ICD-10-CM

## 2024-04-05 PROBLEM — R10.84 ABDOMINAL PAIN, DIFFUSE: Status: ACTIVE | Noted: 2024-04-05

## 2024-04-05 PROBLEM — Z51.11 ENCOUNTER FOR CHEMOTHERAPY MANAGEMENT: Status: ACTIVE | Noted: 2023-10-07

## 2024-04-05 LAB
ALBUMIN SERPL ELPH-MCNC: 3.7 G/DL — SIGNIFICANT CHANGE UP (ref 3.3–5.2)
ALBUMIN SERPL ELPH-MCNC: 4 G/DL
ALBUMIN SERPL ELPH-MCNC: 4 G/DL
ALP BLD-CCNC: 366 U/L
ALP BLD-CCNC: 367 U/L
ALP SERPL-CCNC: 398 U/L — HIGH (ref 40–120)
ALT FLD-CCNC: 70 U/L — HIGH
ALT SERPL-CCNC: 80 U/L
ALT SERPL-CCNC: 81 U/L
AMYLASE/CREAT SERPL: 46 U/L
ANION GAP SERPL CALC-SCNC: 13 MMOL/L — SIGNIFICANT CHANGE UP (ref 5–17)
ANION GAP SERPL CALC-SCNC: 14 MMOL/L
APPEARANCE UR: CLEAR — SIGNIFICANT CHANGE UP
APPEARANCE: CLEAR
AST SERPL-CCNC: 52 U/L — HIGH
AST SERPL-CCNC: 62 U/L
AST SERPL-CCNC: 72 U/L
BACTERIA # UR AUTO: ABNORMAL /HPF
BACTERIA: NEGATIVE /HPF
BASOPHILS # BLD AUTO: 0.1 K/UL — SIGNIFICANT CHANGE UP (ref 0–0.2)
BASOPHILS NFR BLD AUTO: 0.8 % — SIGNIFICANT CHANGE UP (ref 0–2)
BILIRUB DIRECT SERPL-MCNC: 0.3 MG/DL
BILIRUB DIRECT SERPL-MCNC: 0.3 MG/DL — SIGNIFICANT CHANGE UP (ref 0–0.3)
BILIRUB INDIRECT SERPL-MCNC: 0.4 MG/DL
BILIRUB SERPL-MCNC: 0.7 MG/DL
BILIRUB SERPL-MCNC: 0.9 MG/DL — SIGNIFICANT CHANGE UP (ref 0.4–2)
BILIRUB SERPL-MCNC: 1 MG/DL
BILIRUB UR-MCNC: ABNORMAL
BILIRUBIN URINE: ABNORMAL
BLOOD URINE: NEGATIVE
BUN SERPL-MCNC: 23.3 MG/DL — HIGH (ref 8–20)
BUN SERPL-MCNC: 29 MG/DL
CALCIUM SERPL-MCNC: 9.2 MG/DL
CALCIUM SERPL-MCNC: 9.2 MG/DL — SIGNIFICANT CHANGE UP (ref 8.4–10.5)
CAST: 1 /LPF — SIGNIFICANT CHANGE UP (ref 0–4)
CAST: 3 /LPF
CHLORIDE SERPL-SCNC: 96 MMOL/L — SIGNIFICANT CHANGE UP (ref 96–108)
CHLORIDE SERPL-SCNC: 97 MMOL/L
CO2 SERPL-SCNC: 22 MMOL/L
CO2 SERPL-SCNC: 22 MMOL/L — SIGNIFICANT CHANGE UP (ref 22–29)
COLOR SPEC: SIGNIFICANT CHANGE UP
COLOR: NORMAL
CREAT SERPL-MCNC: 1.15 MG/DL — SIGNIFICANT CHANGE UP (ref 0.5–1.3)
CREAT SERPL-MCNC: 1.29 MG/DL
DEPRECATED KAPPA LC FREE/LAMBDA SER: 1.64 RATIO
DIFF PNL FLD: NEGATIVE — SIGNIFICANT CHANGE UP
EGFR: 62 ML/MIN/1.73M2
EGFR: 71 ML/MIN/1.73M2 — SIGNIFICANT CHANGE UP
EOSINOPHIL # BLD AUTO: 0.36 K/UL — SIGNIFICANT CHANGE UP (ref 0–0.5)
EOSINOPHIL NFR BLD AUTO: 3.1 % — SIGNIFICANT CHANGE UP (ref 0–6)
EPITHELIAL CELLS: 1 /HPF
GLUCOSE QUALITATIVE U: NEGATIVE MG/DL
GLUCOSE SERPL-MCNC: 101 MG/DL
GLUCOSE SERPL-MCNC: 82 MG/DL — SIGNIFICANT CHANGE UP (ref 70–99)
GLUCOSE UR QL: NEGATIVE MG/DL — SIGNIFICANT CHANGE UP
HCT VFR BLD CALC: 44.1 % — SIGNIFICANT CHANGE UP (ref 39–50)
HGB BLD-MCNC: 14.9 G/DL — SIGNIFICANT CHANGE UP (ref 13–17)
IGA SER QL IEP: 236 MG/DL
IGG SER QL IEP: 1015 MG/DL
IGM SER QL IEP: 29 MG/DL
IMM GRANULOCYTES NFR BLD AUTO: 0.5 % — SIGNIFICANT CHANGE UP (ref 0–0.9)
KAPPA LC CSF-MCNC: 1.95 MG/DL
KAPPA LC SERPL-MCNC: 3.2 MG/DL
KETONES UR-MCNC: ABNORMAL MG/DL
KETONES URINE: ABNORMAL MG/DL
LDH SERPL-CCNC: 178 U/L
LEUKOCYTE ESTERASE UR-ACNC: ABNORMAL
LEUKOCYTE ESTERASE URINE: ABNORMAL
LIDOCAIN IGE QN: 25 U/L — SIGNIFICANT CHANGE UP (ref 22–51)
LPL SERPL-CCNC: 24 U/L
LYMPHOCYTES # BLD AUTO: 1.74 K/UL — SIGNIFICANT CHANGE UP (ref 1–3.3)
LYMPHOCYTES # BLD AUTO: 14.8 % — SIGNIFICANT CHANGE UP (ref 13–44)
MCHC RBC-ENTMCNC: 28.4 PG — SIGNIFICANT CHANGE UP (ref 27–34)
MCHC RBC-ENTMCNC: 33.8 GM/DL — SIGNIFICANT CHANGE UP (ref 32–36)
MCV RBC AUTO: 84.2 FL — SIGNIFICANT CHANGE UP (ref 80–100)
MICROSCOPIC-UA: NORMAL
MONOCYTES # BLD AUTO: 1.17 K/UL — HIGH (ref 0–0.9)
MONOCYTES NFR BLD AUTO: 9.9 % — SIGNIFICANT CHANGE UP (ref 2–14)
NEUTROPHILS # BLD AUTO: 8.34 K/UL — HIGH (ref 1.8–7.4)
NEUTROPHILS NFR BLD AUTO: 70.9 % — SIGNIFICANT CHANGE UP (ref 43–77)
NITRITE UR-MCNC: NEGATIVE — SIGNIFICANT CHANGE UP
NITRITE URINE: NEGATIVE
PH UR: 6 — SIGNIFICANT CHANGE UP (ref 5–8)
PH URINE: 5.5
PLATELET # BLD AUTO: 174 K/UL — SIGNIFICANT CHANGE UP (ref 150–400)
POTASSIUM SERPL-MCNC: 4.3 MMOL/L — SIGNIFICANT CHANGE UP (ref 3.5–5.3)
POTASSIUM SERPL-SCNC: 4.3 MMOL/L — SIGNIFICANT CHANGE UP (ref 3.5–5.3)
POTASSIUM SERPL-SCNC: 4.4 MMOL/L
PROT SERPL-MCNC: 6.9 G/DL
PROT SERPL-MCNC: 6.9 G/DL
PROT SERPL-MCNC: 7.2 G/DL — SIGNIFICANT CHANGE UP (ref 6.6–8.7)
PROT UR-MCNC: SIGNIFICANT CHANGE UP MG/DL
PROTEIN URINE: 30 MG/DL
RBC # BLD: 5.24 M/UL — SIGNIFICANT CHANGE UP (ref 4.2–5.8)
RBC # FLD: 13.7 % — SIGNIFICANT CHANGE UP (ref 10.3–14.5)
RBC CASTS # UR COMP ASSIST: 3 /HPF — SIGNIFICANT CHANGE UP (ref 0–4)
RED BLOOD CELLS URINE: 3 /HPF
SODIUM SERPL-SCNC: 131 MMOL/L — LOW (ref 135–145)
SODIUM SERPL-SCNC: 133 MMOL/L
SP GR SPEC: 1.02 — SIGNIFICANT CHANGE UP (ref 1–1.03)
SPECIFIC GRAVITY URINE: >1.03
SQUAMOUS # UR AUTO: 1 /HPF — SIGNIFICANT CHANGE UP (ref 0–5)
URATE SERPL-MCNC: 6.5 MG/DL
UROBILINOGEN FLD QL: 1 MG/DL — SIGNIFICANT CHANGE UP (ref 0.2–1)
UROBILINOGEN URINE: 1 MG/DL
WBC # BLD: 11.77 K/UL — HIGH (ref 3.8–10.5)
WBC # FLD AUTO: 11.77 K/UL — HIGH (ref 3.8–10.5)
WBC UR QL: 1 /HPF — SIGNIFICANT CHANGE UP (ref 0–5)
WHITE BLOOD CELLS URINE: 2 /HPF

## 2024-04-05 PROCEDURE — 99223 1ST HOSP IP/OBS HIGH 75: CPT

## 2024-04-05 PROCEDURE — 76705 ECHO EXAM OF ABDOMEN: CPT | Mod: 26

## 2024-04-05 RX ORDER — CEFOTETAN DISODIUM 1 G
VIAL (EA) INJECTION
Refills: 0 | Status: DISCONTINUED | OUTPATIENT
Start: 2024-04-05 | End: 2024-04-08

## 2024-04-05 RX ORDER — LOSARTAN POTASSIUM 100 MG/1
25 TABLET, FILM COATED ORAL DAILY
Refills: 0 | Status: DISCONTINUED | OUTPATIENT
Start: 2024-04-06 | End: 2024-04-06

## 2024-04-05 RX ORDER — CEFOTETAN DISODIUM 1 G
2 VIAL (EA) INJECTION ONCE
Refills: 0 | Status: COMPLETED | OUTPATIENT
Start: 2024-04-05 | End: 2024-04-05

## 2024-04-05 RX ORDER — CEFOTETAN DISODIUM 1 G
2 VIAL (EA) INJECTION EVERY 12 HOURS
Refills: 0 | Status: DISCONTINUED | OUTPATIENT
Start: 2024-04-06 | End: 2024-04-08

## 2024-04-05 RX ORDER — AMLODIPINE BESYLATE 2.5 MG/1
10 TABLET ORAL DAILY
Refills: 0 | Status: DISCONTINUED | OUTPATIENT
Start: 2024-04-06 | End: 2024-04-06

## 2024-04-05 RX ADMIN — Medication 100 GRAM(S): at 16:04

## 2024-04-05 NOTE — PHYSICAL EXAM
[Fully active, able to carry on all pre-disease performance without restriction] : Status 0 - Fully active, able to carry on all pre-disease performance without restriction [Normal] : affect appropriate [de-identified] : soft, minimal poorly localized tenderness, no rebound, no H/S megaly

## 2024-04-05 NOTE — CONSULT NOTE ADULT - SUBJECTIVE AND OBJECTIVE BOX
SURGERY CONSULT  ==============================================================================================================  HPI: 64y Male with PMh of CLL on imbruvia and PSH of umbilical hernia repair reports that he has had generalized abdominal pain and malaise for about 1 week and then yesterday had very dark urine that made him go to an urgent care. In UC patient had a UA with presence of bilirubin and was sent for a CT that showed concern for acute cholecystitis and was sent to the ED. In ED patient reports that he does not have abdominal pain and has minimal tenderness, has been able to eat and urine is getting better.       PAST MEDICAL & SURGICAL HISTORY:  CLL (chronic lymphocytic leukemia)      Hypertension      H/O hernia repair        Home Meds: Home Medications:  losartan 50 mg oral tablet: 1 tab(s) orally once a day (18 Jun 2019 22:08)    Allergies: Allergies    No Known Allergies    Intolerances      Soc:   Advanced Directives: Presumed Full Code     CURRENT MEDICATIONS:   --------------------------------------------------------------------------------------  Neurologic Medications    Respiratory Medications    Cardiovascular Medications    Gastrointestinal Medications    Genitourinary Medications    Hematologic/Oncologic Medications    Antimicrobial/Immunologic Medications  cefoTEtan  IVPB        Endocrine/Metabolic Medications    Topical/Other Medications    --------------------------------------------------------------------------------------    VITAL SIGNS, INS/OUTS (last 24 hours):  --------------------------------------------------------------------------------------  ICU Vital Signs Last 24 Hrs  T(C): 36.7 (05 Apr 2024 14:57), Max: 36.7 (05 Apr 2024 14:57)  T(F): 98 (05 Apr 2024 14:57), Max: 98 (05 Apr 2024 14:57)  HR: 65 (05 Apr 2024 14:57) (65 - 69)  BP: 114/70 (05 Apr 2024 14:57) (114/70 - 132/78)  BP(mean): --  ABP: --  ABP(mean): --  RR: 20 (05 Apr 2024 14:57) (20 - 20)  SpO2: 97% (05 Apr 2024 14:57) (97% - 98%)    O2 Parameters below as of 05 Apr 2024 14:57  Patient On (Oxygen Delivery Method): room air          I&O's Summary    --------------------------------------------------------------------------------------    PHYSICAL EXAM:  GENERAL: NAD, well-groomed, well-developed  HEAD:  Atraumatic, Normocephalic  EYES: EOMI, PERRLA, conjunctiva and sclera clear  NECK: Supple, No JVD  CHEST/LUNG: non-labored breathing on room air.   HEART: Regular rate and rhythm; S1/S2  ABDOMEN: Soft, Nontender, Nondistended, negative Epstein's   VASCULAR: Normal pulses, Normal capillary refill  EXTREMITIES:  2+ Peripheral Pulses, No cyanosis, No edema  SKIN: Warm, Intact      LABS  --------------------------------------------------------------------------------------  Labs:  CAPILLARY BLOOD GLUCOSE                              14.9   11.77 )-----------( 174      ( 05 Apr 2024 12:47 )             44.1       Auto Neutrophil %: 70.9 % (04-05-24 @ 12:47)  Auto Immature Granulocyte %: 0.5 % (04-05-24 @ 12:47)    04-05    131<L>  |  96  |  23.3<H>  ----------------------------<  82  4.3   |  22.0  |  1.15      Calcium: 9.2 mg/dL (04-05-24 @ 12:47)      LFTs:             7.2  | 0.9  | 52       ------------------[398     ( 05 Apr 2024 12:47 )  3.7  | 0.3  | 70          Lipase:25     Amylase:x             Coags:            Urinalysis Basic - ( 05 Apr 2024 12:47 )    Color: x / Appearance: x / SG: x / pH: x  Gluc: 82 mg/dL / Ketone: x  / Bili: x / Urobili: x   Blood: x / Protein: x / Nitrite: x   Leuk Esterase: x / RBC: x / WBC x   Sq Epi: x / Non Sq Epi: x / Bacteria: x          --------------------------------------------------------------------------------------

## 2024-04-05 NOTE — REVIEW OF SYSTEMS
[Abdominal Pain] : abdominal pain [Diarrhea: Grade 1 - Increase of <4 stools per day over baseline; mild increase in ostomy output compared to baseline] : Diarrhea: Grade 1 - Increase of <4 stools per day over baseline; mild increase in ostomy output compared to baseline [Negative] : Allergic/Immunologic [Fever] : no fever [Chills] : no chills [FreeTextEntry7] : as above; diarrhea resolved

## 2024-04-05 NOTE — ED CDU PROVIDER INITIAL DAY NOTE - CLINICAL SUMMARY MEDICAL DECISION MAKING FREE TEXT BOX
65yo M PMHx CLL on ibrutinib, hypertension presented to ED with abdominal pain, outpt CT demonstrating cholecystitis. In ED, labs notable for leukocytosis 11k, transaminitis and alk phos 398. Tbili WNL. RUQ US demonstrating acute cholecystitis. Pt prefers not to undergo cholecystectomy despite surgery team's recommendation. Surgery recommending MRCP to determine if choledocho present. If MRCP negative for obstructive stone can dc pt on PO abx with return precautions. If +for obstructing stone pt states he would be agreeable to admission and surgery.

## 2024-04-05 NOTE — ED CDU PROVIDER INITIAL DAY NOTE - OBJECTIVE STATEMENT
65yo M PMHx CLL on ibrutinib, hypertension presented to ED with abdominal pain, outpt CT demonstrating cholecystitis.  Patient states for the past few weeks has been having intermittent abdominal pain with diarrhea and for a couple of days has been having dark urine.  Patient went to see his PCP yesterday PCP did a UA which showed bilirubin therefore was sent for a CAT scan.  Patient got CT results this morning there is concern for cholecystitis as well as diverticulitis.   patient reports in the past few weeks the pain has been intermittent no known alleviating or exacerbating factors spontaneously resolving.  Patient reports intermittent episodes of nonbloody diarrhea. In ED, labs notable for leukocytosis 11k, transaminitis and alk phos 398. Tbili WNL. RUQ US demonstrating acute cholecystitis. Pt prefers not to undergo cholecystectomy despite surgery team's recommendation. Surgery recommending MRCP to determine if choledocho present. If MRCP negative for obstructive stone can dc pt on PO abx with return precautions. If +for obstructing stone pt states he would be agreeable to admission and surgery. Pt without complaints at this time, abdominal pain resolved. Pt denies fevers/chills, abd pain, n/v, back pan, ha, loc, focal neuro deficits, cp/sob/palp, cough, recent travel.

## 2024-04-05 NOTE — ED CDU PROVIDER INITIAL DAY NOTE - NSICDXPASTSURGICALHX_GEN_ALL_CORE_FT
rxs approved qty 90 days  Refills sent to [facility] in [place] PAST SURGICAL HISTORY:  H/O hernia repair

## 2024-04-05 NOTE — ED PROVIDER NOTE - PHYSICAL EXAMINATION
Const: Awake, alert and oriented. In no acute distress. Well appearing.  HEENT: NC/AT. Moist mucous membranes.  Eyes: No scleral icterus. EOMI.  Neck:. Soft and supple. Full ROM without pain.  Cardiac: Regular rate and regular rhythm. +S1/S2. Peripheral pulses 2+ and symmetric. No LE edema.  Resp: Speaking in full sentences. No evidence of respiratory distress. No wheezes, rales or rhonchi.  Abd: Soft, mild ttp RUQ, non-distended. Normal bowel sounds in all 4 quadrants. No guarding or rebound.  Back: Spine midline and non-tender. No CVAT.  Skin: No rashes, abrasions or lacerations.  Lymph: No cervical lymphadenopathy.  Neuro: Awake, alert & oriented x 3. Moves all extremities symmetrically.

## 2024-04-05 NOTE — ASSESSMENT
[FreeTextEntry1] : Mr. Don is a 64 year old male with CLL (diagnosed 2014) on [id number] study (MRD Cohort)- in CR. Trial was unblinded and was on the ibrutinib arm no significant therapy-related toxicity  c/o abd discomfort, as above; recurrent, may be improving, had diarrhea now resolved poorly localized pain has urinary frequency did not want to check urine here today >>> seeing Dr. Bhatti later today no fever or chills. Increased WBC noted.  HTN well controlled on losartan and incr dose of amlodipine Remains w/o evidence of dz recurrence, on daily ibrutinib.  increased WBC today, with incr PMN, mono,  remains with decr lymphs  CBC results reviewed and d/w pt WBC 11.38, Hgb 15.7, Plt 184K, ANC 7.76, ALC 1.74, AMC 1.41  continue ibrutinib 420 mg daily  He again says that he will have a colonoscopy - he's never had one to date- "I'm not opposed to it in theory"  check CMP, LDH, uric acid, Igs  RV in 3 mos.  addendum:  seen in Dr. Bhatti's office today: amylase, lipase nl, no suggestion of UTI in U/A. CMP shows incr AST, ALT, alk phos, ? biliary source for pain?

## 2024-04-05 NOTE — ED PROVIDER NOTE - OBJECTIVE STATEMENT
64-year-old male with a past medical history of CLL, hypertension presents with abdominal pain.  Patient states for the past few weeks has been having intermittent abdominal pain with diarrhea and for a couple of days has been having dark urine.  Patient went to see his PCP yesterday PCP did a UA which showed bilirubin therefore was sent for a CAT scan.  Patient got CT results this morning there is concern for cholecystitis as well as diverticulitis.   patient reports in the past few weeks the pain has been intermittent no known alleviating or exacerbating factors spontaneously resolving.  Patient reports intermittent episodes of nonbloody diarrhea. Pt denies fevers/chills, ha, loc, focal neuro deficits, cp/sob/palp, cough, n/v, urinary symptoms, recent travel

## 2024-04-05 NOTE — CONSULT NOTE ADULT - ASSESSMENT
63 yo M sent for concern of acute cholecystitis  US and CT consistent with cholecystitis, however, clinical picture concerning for biliary obstruction with main complaint being dark urine and elevated alk phos to ~400  Patient reports that he does not want surgery at this moment and would like conservative management.   Given concern for obstruction secondary to stones vs malignancy, MRCP would define further plan.  If MRCP is positive for choledocholithiasis would recommend ERCP and cholecystectomy, if negative for tumor or stone in CBD would recommend cholecystectomy vs antibiotic therapy and outpatient follow-up.

## 2024-04-05 NOTE — ED CDU PROVIDER INITIAL DAY NOTE - NS ED ROS FT
Gen: denies fever, chills, fatigue, weight loss  Skin: denies rashes, laceration, bruising  HEENT: denies visual changes, ear pain, nasal congestion, throat pain  Respiratory: denies SALMON, SOB, cough, wheezing  Cardiovascular: denies chest pain, palpitations, diaphoresis, LE edema  GI: denies abdominal pain, n/v/d  : +Dark urine. denies dysuria, frequency, urgency, bowel/bladder incontinence  MSK: denies joint swelling/pain, back pain, neck pain  Neuro: denies headache, dizziness, weakness, numbness  Psych: denies anxiety, depression, SI/HI, visual/auditory hallucinations

## 2024-04-05 NOTE — ED PROVIDER NOTE - CLINICAL SUMMARY MEDICAL DECISION MAKING FREE TEXT BOX
64-year-old male with a past medical history of CLL, hypertension presents with abdominal pain.  Patient without side CT scan concerning for cholecystitis mild tenderness to palpation to right upper quadrant here otherwise well-appearing will rule out cholelithiasis versus acute cholecystitis will do ultrasound, labs.  Patient also found to have acute diverticulitis on CT will need antibiotics

## 2024-04-05 NOTE — ED ADULT TRIAGE NOTE - CHIEF COMPLAINT QUOTE
" I had a CT yesterday and I received the results this morning was told  there is a problem with my gallbladder" pt diagnosed with diverticulitis and gallstones with inflammation. pt was having abdominal pain for a couple of weeks.

## 2024-04-05 NOTE — ED ADULT NURSE NOTE - OBJECTIVE STATEMENT
Pt c/o generalized and diffuse abd pain and tenderness with subjective fever and intermittent diarrhea and urgency x 2 wks. Pt also c/o his urine is very orange and burning during voiding. Went to PCP and got CT which shows gallstones and diverticulitis with inflammation. PCP refer pt to ED for further eval. AO4, NAD. ED workup.

## 2024-04-05 NOTE — ED PROVIDER NOTE - PROGRESS NOTE DETAILS
Pristupa: PT evaluated by intake physician. HPI/PE/ROS as noted above. Will follow up plan per intake physician. Discussed results with pt. pt was evaluated by surgery and recommending surgery. Pt states he would prefer conservative management. Plan for MRCP. if no stones in BD, surgery team recommending PO abx. Pt in agreement with plan. Placed in obs pending MRCP

## 2024-04-05 NOTE — HISTORY OF PRESENT ILLNESS
[de-identified] : Mr. Don is a 63 year old male with CLL (diagnosed 2014) on [id number] study who presents for follow-up.  CLL is CD38+, ZAP-70+, unmut IGVH, TP53 mut. On MRD cohort of study, ibrutinib vs placebo. Unblinded 2/2023; was on ibrutinib, now on commercial drug.  He feels well and has no new complaints.   He received 2 doses of the COVID vaccine (Moderna)--> he tested positive for antibodies. He received a Moderna booster vaccine.   [de-identified] : CLL [de-identified] : CD38+, ZAP70+\par  unmutated IGHV\par  TP53 mutation. \par   [de-identified] : CLL, remains on ibrutinib 420 mg/d cont to tolerate well no constitutional c/o no resp infections, cardiac c/o c/o abd pain last two weeks, on and off, "burning"  poorly localized sensation, not affected by food, initially had 2-3 episodes of diarrhea. May be getting better. +malaise, fatigue, afebrile CT N/C/A/P - 12/29/22 - stable, minimal possibly insignificant LAD, no splenomegaly MRI 8/15/23 -  Innumerable right renal cysts, some of which demonstrate varying degrees of hemorrhage.  While these are incompletely characterized without intravenous contrast, there are no signal  characteristics to suggest a solid renal mass. has had increased urination w/o dysuria or hematuria  HTN controlled on amlodipine 5 mg and losartan 100 mg/d

## 2024-04-06 DIAGNOSIS — I10 ESSENTIAL (PRIMARY) HYPERTENSION: ICD-10-CM

## 2024-04-06 DIAGNOSIS — K81.0 ACUTE CHOLECYSTITIS: ICD-10-CM

## 2024-04-06 DIAGNOSIS — Z01.818 ENCOUNTER FOR OTHER PREPROCEDURAL EXAMINATION: ICD-10-CM

## 2024-04-06 LAB
ALBUMIN SERPL ELPH-MCNC: 3.4 G/DL — SIGNIFICANT CHANGE UP (ref 3.3–5.2)
ALP SERPL-CCNC: 339 U/L — HIGH (ref 40–120)
ALT FLD-CCNC: 57 U/L — HIGH
ANION GAP SERPL CALC-SCNC: 13 MMOL/L — SIGNIFICANT CHANGE UP (ref 5–17)
APTT BLD: 31.4 SEC — SIGNIFICANT CHANGE UP (ref 24.5–35.6)
AST SERPL-CCNC: 37 U/L — SIGNIFICANT CHANGE UP
BASOPHILS # BLD AUTO: 0.09 K/UL — SIGNIFICANT CHANGE UP (ref 0–0.2)
BASOPHILS NFR BLD AUTO: 0.9 % — SIGNIFICANT CHANGE UP (ref 0–2)
BILIRUB SERPL-MCNC: 0.7 MG/DL — SIGNIFICANT CHANGE UP (ref 0.4–2)
BLD GP AB SCN SERPL QL: SIGNIFICANT CHANGE UP
BUN SERPL-MCNC: 23.8 MG/DL — HIGH (ref 8–20)
CALCIUM SERPL-MCNC: 8.9 MG/DL — SIGNIFICANT CHANGE UP (ref 8.4–10.5)
CHLORIDE SERPL-SCNC: 102 MMOL/L — SIGNIFICANT CHANGE UP (ref 96–108)
CO2 SERPL-SCNC: 22 MMOL/L — SIGNIFICANT CHANGE UP (ref 22–29)
CREAT SERPL-MCNC: 1.12 MG/DL — SIGNIFICANT CHANGE UP (ref 0.5–1.3)
EGFR: 73 ML/MIN/1.73M2 — SIGNIFICANT CHANGE UP
EOSINOPHIL # BLD AUTO: 0.39 K/UL — SIGNIFICANT CHANGE UP (ref 0–0.5)
EOSINOPHIL NFR BLD AUTO: 3.8 % — SIGNIFICANT CHANGE UP (ref 0–6)
GLUCOSE SERPL-MCNC: 71 MG/DL — SIGNIFICANT CHANGE UP (ref 70–99)
HCT VFR BLD CALC: 42.7 % — SIGNIFICANT CHANGE UP (ref 39–50)
HGB BLD-MCNC: 14.6 G/DL — SIGNIFICANT CHANGE UP (ref 13–17)
IMM GRANULOCYTES NFR BLD AUTO: 0.9 % — SIGNIFICANT CHANGE UP (ref 0–0.9)
INR BLD: 0.97 RATIO — SIGNIFICANT CHANGE UP (ref 0.85–1.18)
LIDOCAIN IGE QN: 27 U/L — SIGNIFICANT CHANGE UP (ref 22–51)
LYMPHOCYTES # BLD AUTO: 1.48 K/UL — SIGNIFICANT CHANGE UP (ref 1–3.3)
LYMPHOCYTES # BLD AUTO: 14.5 % — SIGNIFICANT CHANGE UP (ref 13–44)
MCHC RBC-ENTMCNC: 29 PG — SIGNIFICANT CHANGE UP (ref 27–34)
MCHC RBC-ENTMCNC: 34.2 GM/DL — SIGNIFICANT CHANGE UP (ref 32–36)
MCV RBC AUTO: 84.7 FL — SIGNIFICANT CHANGE UP (ref 80–100)
MONOCYTES # BLD AUTO: 1.1 K/UL — HIGH (ref 0–0.9)
MONOCYTES NFR BLD AUTO: 10.8 % — SIGNIFICANT CHANGE UP (ref 2–14)
NEUTROPHILS # BLD AUTO: 7.08 K/UL — SIGNIFICANT CHANGE UP (ref 1.8–7.4)
NEUTROPHILS NFR BLD AUTO: 69.1 % — SIGNIFICANT CHANGE UP (ref 43–77)
PLATELET # BLD AUTO: 163 K/UL — SIGNIFICANT CHANGE UP (ref 150–400)
POTASSIUM SERPL-MCNC: 4.8 MMOL/L — SIGNIFICANT CHANGE UP (ref 3.5–5.3)
POTASSIUM SERPL-SCNC: 4.8 MMOL/L — SIGNIFICANT CHANGE UP (ref 3.5–5.3)
PROT SERPL-MCNC: 6.5 G/DL — LOW (ref 6.6–8.7)
PROTHROM AB SERPL-ACNC: 10.8 SEC — SIGNIFICANT CHANGE UP (ref 9.5–13)
RBC # BLD: 5.04 M/UL — SIGNIFICANT CHANGE UP (ref 4.2–5.8)
RBC # FLD: 13.7 % — SIGNIFICANT CHANGE UP (ref 10.3–14.5)
SODIUM SERPL-SCNC: 137 MMOL/L — SIGNIFICANT CHANGE UP (ref 135–145)
WBC # BLD: 10.23 K/UL — SIGNIFICANT CHANGE UP (ref 3.8–10.5)
WBC # FLD AUTO: 10.23 K/UL — SIGNIFICANT CHANGE UP (ref 3.8–10.5)

## 2024-04-06 PROCEDURE — 99254 IP/OBS CNSLTJ NEW/EST MOD 60: CPT

## 2024-04-06 PROCEDURE — 93010 ELECTROCARDIOGRAM REPORT: CPT

## 2024-04-06 PROCEDURE — 99233 SBSQ HOSP IP/OBS HIGH 50: CPT

## 2024-04-06 PROCEDURE — 71045 X-RAY EXAM CHEST 1 VIEW: CPT | Mod: 26

## 2024-04-06 PROCEDURE — 74183 MRI ABD W/O CNTR FLWD CNTR: CPT | Mod: 26,MC

## 2024-04-06 RX ORDER — LOSARTAN POTASSIUM 100 MG/1
25 TABLET, FILM COATED ORAL DAILY
Refills: 0 | Status: DISCONTINUED | OUTPATIENT
Start: 2024-04-06 | End: 2024-04-08

## 2024-04-06 RX ORDER — HEPARIN SODIUM 5000 [USP'U]/ML
5000 INJECTION INTRAVENOUS; SUBCUTANEOUS EVERY 8 HOURS
Refills: 0 | Status: DISCONTINUED | OUTPATIENT
Start: 2024-04-06 | End: 2024-04-06

## 2024-04-06 RX ORDER — AMLODIPINE BESYLATE 2.5 MG/1
10 TABLET ORAL DAILY
Refills: 0 | Status: DISCONTINUED | OUTPATIENT
Start: 2024-04-06 | End: 2024-04-08

## 2024-04-06 RX ORDER — ACETAMINOPHEN 500 MG
975 TABLET ORAL EVERY 6 HOURS
Refills: 0 | Status: DISCONTINUED | OUTPATIENT
Start: 2024-04-06 | End: 2024-04-08

## 2024-04-06 RX ADMIN — LOSARTAN POTASSIUM 25 MILLIGRAM(S): 100 TABLET, FILM COATED ORAL at 06:11

## 2024-04-06 RX ADMIN — Medication 100 GRAM(S): at 06:13

## 2024-04-06 RX ADMIN — AMLODIPINE BESYLATE 10 MILLIGRAM(S): 2.5 TABLET ORAL at 06:11

## 2024-04-06 RX ADMIN — Medication 100 GRAM(S): at 17:01

## 2024-04-06 NOTE — H&P ADULT - ASSESSMENT
63 yo M sent for concern of acute cholecystitis  US and CT consistent with cholecystitis, however, clinical picture concerning for biliary obstruction with main complaint being dark urine and elevated alk phos to ~400  MRCP negative for choledocholithiasis, positive for acute cholecystitis    Plan:  -admit to surgery under Dr. Benavides  -patient amenable to cholecystectomy  -CLD today  -added on to OR friday 4/12 for robo assisted cholecystectomy  -cardiology eval for medical optimization  -heme/onc consult  -holding imbruvica  -pain control  -strict Is/Os  -continue home meds  -trend labs, replete electrolytes as needed  -encourage OOB  -incentive spirometry  -DVT ppx: SCDs

## 2024-04-06 NOTE — ED CDU PROVIDER SUBSEQUENT DAY NOTE - HISTORY
Pt resting comfortably at time of re-assessment. No events overnight. Pending MRCP, am labs.  Will continue to monitor.

## 2024-04-06 NOTE — CONSULT NOTE ADULT - PROBLEM SELECTOR RECOMMENDATION 2
recent stress echo negative   patient has no chest discomfort and feels fine  Ekg unchanged from 6/2  Patient is in optimal condition for proposed surgical procedure  No further testing is required

## 2024-04-06 NOTE — H&P ADULT - HISTORY OF PRESENT ILLNESS
HPI: 64y Male with PMh of CLL on imbruvia and PSH of umbilical hernia repair reports that he has had generalized abdominal pain and malaise for about 1 week and then yesterday had very dark urine that made him go to an urgent care. In UC patient had a UA with presence of bilirubin and was sent for a CT that showed concern for acute cholecystitis and was sent to the ED. In ED patient reports that he does not have abdominal pain and has minimal tenderness, has been able to eat and urine is getting better.       PAST MEDICAL & SURGICAL HISTORY:  CLL (chronic lymphocytic leukemia)      Hypertension      H/O hernia repair        Home Meds: Home Medications:  losartan 50 mg oral tablet: 1 tab(s) orally once a day (18 Jun 2019 22:08)    Allergies: Allergies    No Known Allergies    Intolerances      Soc:   Advanced Directives: Presumed Full Code     CURRENT MEDICATIONS:   --------------------------------------------------------------------------------------  Neurologic Medications    Respiratory Medications    Cardiovascular Medications    Gastrointestinal Medications    Genitourinary Medications    Hematologic/Oncologic Medications    Antimicrobial/Immunologic Medications  cefoTEtan  IVPB        Endocrine/Metabolic Medications    Topical/Other Medications    --------------------------------------------------------------------------------------    VITAL SIGNS, INS/OUTS (last 24 hours):  Vital Signs Last 24 Hrs  T(C): 36.6 (06 Apr 2024 15:53), Max: 36.9 (06 Apr 2024 03:07)  T(F): 97.8 (06 Apr 2024 15:53), Max: 98.4 (06 Apr 2024 03:07)  HR: 68 (06 Apr 2024 15:53) (68 - 78)  BP: 116/71 (06 Apr 2024 15:53) (100/69 - 131/85)  BP(mean): --  RR: 18 (06 Apr 2024 15:53) (18 - 20)  SpO2: 96% (06 Apr 2024 15:53) (95% - 97%)    Parameters below as of 06 Apr 2024 15:53  Patient On (Oxygen Delivery Method): room air        Labs:  04-06    137  |  102  |  23.8<H>  ----------------------------<  71  4.8   |  22.0  |  1.12    Ca    8.9      06 Apr 2024 05:11    TPro  6.5<L>  /  Alb  3.4  /  TBili  0.7  /  DBili  x   /  AST  37  /  ALT  57<H>  /  AlkPhos  339<H>  04-06                            14.6   10.23 )-----------( 163      ( 06 Apr 2024 05:11 )             42.7           PHYSICAL EXAM:  GENERAL: NAD, well-groomed, well-developed  HEAD:  Atraumatic, Normocephalic  EYES: EOMI, PERRLA, conjunctiva and sclera clear  NECK: Supple, No JVD  CHEST/LUNG: non-labored breathing on room air.   HEART: Regular rate and rhythm; S1/S2  ABDOMEN: Soft, Nontender, Nondistended, negative Epstein's   VASCULAR: Normal pulses, Normal capillary refill  EXTREMITIES:  2+ Peripheral Pulses, No cyanosis, No edema  SKIN: Warm, Intact

## 2024-04-06 NOTE — H&P ADULT - ATTENDING COMMENTS
Above assessment noted.  The patient was seen and examined by myself with the surgical PA and resident. The patient was without abdominal pain.  MRCP without CBD stones only inflammation of the gallbladder consistent with acute cholecystitis.  Abdomen is soft wihtout tenderness, no guarding, no rebound.  The patient will need to hold Imbruvica for 7 days prior to surgery.  Medical and cardiac evaluations pre-op.  Plan for robotic/lap/open kristin Friday.  The risks, benefits, and alternatives including the option of doing nothing to a robotic, possible laparoscopic, and possible open cholecystectomy were discussed.  The potential complications including but not limited to infection, bleeding, bowel injury and/or obstruction, chronic post operative pain, bile leak, and bile duct injury were discussed.  The patient admitted understanding and agrees to proceed as planned.

## 2024-04-06 NOTE — CONSULT NOTE ADULT - ASSESSMENT
63 y/o male with PMH of CLL on ibrutinib  CAD by CTA 2022, negative stress echo 3/24 who presents to the hospital with abdominal discomfort for 2 weeks initially thinking it was a viral syndrome. Found to have acute cholecystitis. No biliary ductal dilatation or evidence of choledocholithiasis on mrcp and is scheduled for Choley.  We were asked to risk stratify patient for surgery  recent stress echo he exercised to 9.4 Mets had no cp and study was read as normal  No recent episodes of chest pain

## 2024-04-06 NOTE — PROGRESS NOTE ADULT - ATTENDING COMMENTS
Above assessment noted.  The patient was seen and examined by myself with the surgical PA and resident. The patient is without abdominal pain at present. He reports that the pain has improved. He has no nausea or vomit.  Abdomen is softly distended without localizing tenderness, no guarding, no rebound.  MRCP is without CBD stones, there is gallbladder wall edema and surrounding fluid consistent with acute cholecystitis.  The patient will benefit from eventual cholecystectomy.  The patient is on Imbruvica, last dose was 2 days ago.  The risk of bleeding necessitates holding the Imbruvica for 7 days prior to major surgery.  The patient will continue with antibiotics, start PO clears.  Will plan for eventual cholecystectomy.  The patient is in agreement to proceed with surgery. Above assessment noted.  The patient was seen and examined by myself with the surgical PA and resident. The patient is without abdominal pain at present. He reports that the pain has improved. He has no nausea or vomit.  Abdomen is softly distended without localizing tenderness, no guarding, no rebound.  MRCP is without CBD stones, there is gallbladder wall edema and surrounding fluid consistent with acute cholecystitis.  The patient will benefit from eventual cholecystectomy.  The patient is on Imbruvica, last dose was 2 days ago.  The risk of bleeding necessitates holding the Imbruvica for 7 days prior to major surgery.  The patient will continue with antibiotics, start PO clears.  Will plan for eventual cholecystectomy.  The patient is in agreement to proceed with surgery.  Trend LFT's given the elevated transaminases.

## 2024-04-06 NOTE — ED CDU PROVIDER SUBSEQUENT DAY NOTE - ATTENDING APP SHARED VISIT CONTRIBUTION OF CARE
I, Miguel Ángel Lemos MD, performed the initial face to face bedside interview with this patient regarding history of present illness, review of symptoms and relevant past medical, social and family history.  I completed an independent physical examination.  I was the initial provider who evaluated this patient. I have signed out the follow up of any pending tests (i.e. labs, radiological studies) to the ACP.  I have communicated the patient’s plan of care and disposition with the ACP.

## 2024-04-06 NOTE — PATIENT PROFILE ADULT - HAVE YOU EXPERIENCED VIOLENCE OR A TRAUMATIC EVENT?
Called patient regarding appt on 03/31/2023. Left message for patient to return call if any questions or concerns arise.   
no

## 2024-04-06 NOTE — ED ADULT NURSE REASSESSMENT NOTE - NS ED NURSE REASSESS COMMENT FT1
Report received from off going RN, care of pt assumed at 0730. pt received resting on stretcher, resp even and unlabored. oob to bathroom with steady gait, updated on plan of care, questions asked and answered. awaiting MRCP

## 2024-04-06 NOTE — CONSULT NOTE ADULT - SUBJECTIVE AND OBJECTIVE BOX
[facility]                                              CARDIOLOGY AT [place] [address]                                             Telephone: [phone]. Fax:[phone]                                                         CARDIOLOGY CONSULTATION NOTE                                                                                             Consult requested by:  Dr Benavides  History obtained by: Patient and medical record  Community Cardiologist:  Dr. Lisker   obtained: Yes [  ] No [x  ]  Reason for Consultation: Risk stratification for surgery  Avialable out pt records reviewed: Yes [x  ] No [  ]      This is a 63 y/o male with PMH of CLL on ibrutinib  CAD by CTA 2022, negative stress echo 3/24 who presents to the hospital with abdominal discomfort for 2 weeks initially thinking it was a viral syndrome. Found to have acute cholecystitis. No biliary ductal dilatation or evidence of choledocholithiasis on mrcp and is scheduled for Choley.  We were asked to risk stratify patient for surgery  recent stress echo he exercised to 9.4 Mets had no cp and study was read as normal  No recent episodes of chest pain    CARDIAC TESTING   Stress Echo 3/24  Protocol: Standard Russell  METS Achieved: 11  Stage Reached: 4  Exercise Duration: 9 min and 44 sec.  Heart Rate: Rest: 70 bpm, Peak: 151 bpm (97 % max predicted)  Blood Pressure: Rest: 128/84 mmHg, Peak: 172/70 mmHg.  Pretest Chest Pain: None  Symptoms During Stress: No symptoms  Reason for Termination: Generalized fatigue    1. Normal exercise stress echocardiogram.  2. Technically difficult image quality.    CTA heart 2022  CT scan from 2022 Was reviewed and noted significant calcification Of the coronary arteries in particular the LAD territory      PAST MEDICAL HISTORY  CLL (chronic lymphocytic leukemia)  Hypertension  HLD  cant tolerate statin    PAST SURGICAL HISTORY  H/O hernia repair      SOCIAL HISTORY:   CIGARETTES:   Former smoker d/giovanni age 24  ALCOHOL:  social etoh  DRUGS:  None  Semi retired   Exercises on a regular basis    FAMILY HISTORY:  Brother just had AAA repair  Family History of Cardiovascular Disease:  Yes [x  ] No [  ]  Coronary Artery Disease in first degree relative: Yes [ x ] No [  ]  Sudden Cardiac Death in First degree relative: Yes [  ] No [ x ]      HOME MEDICATIONS:  Losartan 100mg  Norvasc 5mg qd    CURRENT MEDS  amLODIPine   Tablet 10 milliGRAM(s) Oral daily  losartan 25 milliGRAM(s) Oral daily  cefoTEtan  IVPB  cefoTEtan  IVPB    aLLERGIES: nkda    REVIEW OF SYMPTOMS:   CONSTITUTIONAL: No fever, no chills, no weight loss, no weight gain, no fatigue   ENMT:  No vertigo; No sinus or throat pain  NECK: No pain or stiffness  CARDIOVASCULAR: No chest pain, Mild sob with exercise, no syncope/presyncope, no palpitations, no dizziness, no Orthopnea, no Paroxsymal nocturnal dyspnea  RESPIRATORY: no Shortness of breath, no cough, no wheezing  : No dysuria, no hematuria   GI: No dark color stool, no nausea, no diarrhea, no constipation, no abdominal pain   NEURO: No headache, no slurred speech   MUSCULOSKELETAL: No joint pain or swelling; No muscle, back, or extremity pain  PSYCH: No agitation, no anxiety.    ALL OTHER REVIEW OF SYSTEMS ARE NEGATIVE.    Vital Signs Last 24 Hrs  T(C): 36.6 (06 Apr 2024 15:53), Max: 36.9 (06 Apr 2024 03:07)  T(F): 97.8 (06 Apr 2024 15:53), Max: 98.4 (06 Apr 2024 03:07)  HR: 68 (06 Apr 2024 15:53) (68 - 78)  BP: 116/71 (06 Apr 2024 15:53) (100/69 - 131/85)  BP(mean): --  RR: 18 (06 Apr 2024 15:53) (18 - 20)  SpO2: 96% (06 Apr 2024 15:53) (95% - 97%)    Parameters below as of 06 Apr 2024 15:53  Patient On (Oxygen Delivery Method): room air  INTAKE AND OUTPUT:     PHYSICAL EXAM:  Constitutional: Comfortable . No acute distress.   HEENT: Atraumatic and normocephalic , neck is supple . no JVD. No carotid bruit.  CNS: A&Ox3. No focal deficits.   Respiratory: CTAB, unlabored   Cardiovascular: RRR normal s1 s2. No murmur. No rubs or gallop.  Gastrointestinal: Soft, Mild right upper quad tenderness. +Bowel sounds.   MSK: full ROM extremities x 4  Extremities: No edema. No cyanosis   Psychiatric: Calm . no agitation.   Skin: Warm and dry, no ulcers on extremities       LABS:                        14.6   10.23 )-----------( 163      ( 06 Apr 2024 05:11 )             42.7     04-06    137  |  102  |  23.8<H>  ----------------------------<  71  4.8   |  22.0  |  1.12    Ca    8.9      06 Apr 2024 05:11    TPro  6.5<L>  /  Alb  3.4  /  TBili  0.7  /  DBili  x   /  AST  37  /  ALT  57<H>  /  AlkPhos  339<H>  04-06    PT/INR - ( 06 Apr 2024 12:20 )   PT: 10.8 sec;   INR: 0.97 ratio       PTT - ( 06 Apr 2024 12:20 )  PTT:31.4 sec  Urinalysis Basic - ( 06 Apr 2024 05:11 )    Color: x / Appearance: x / SG: x / pH: x  Gluc: 71 mg/dL / Ketone: x  / Bili: x / Urobili: x   Blood: x / Protein: x / Nitrite: x   Leuk Esterase: x / RBC: x / WBC x   Sq Epi: x / Non Sq Epi: x / Bacteria: x    INTERPRETATION OF TELEMETRY:     ECG: NSR st changes in lateral leads  Unchanged when compared to ekg 6/23  Prior ECG: Yes [  ] No [  ]      RADIOLOGY & ADDITIONAL STUDIES:    X-ray:  reviewed by me. cxr napd                                                  [facility]                                              CARDIOLOGY AT [address], [place] [address]                                             Telephone: [phone]. Fax:[phone]                                                         CARDIOLOGY CONSULTATION NOTE                                                                                             Consult requested by:  Dr Benavides  History obtained by: Patient and medical record  Community Cardiologist:  Dr. Lisker   obtained: Yes [  ] No [x  ]  Reason for Consultation: Risk stratification for surgery  Avialable out pt records reviewed: Yes [x  ] No [  ]      This is a 63 y/o male with PMH of CLL on ibrutinib  CAD by CTA 2022, negative stress echo 3/24 who presents to the hospital with abdominal discomfort for 2 weeks initially thinking it was a viral syndrome. Found to have acute cholecystitis. No biliary ductal dilatation or evidence of choledocholithiasis on mrcp and is scheduled for Choley.  We were asked to risk stratify patient for surgery  recent stress echo he exercised to 9.4 Mets had no cp and study was read as normal  No recent episodes of chest pain    CARDIAC TESTING   Stress Echo 3/24  Protocol: Standard Russell  METS Achieved: 11  Stage Reached: 4  Exercise Duration: 9 min and 44 sec.  Heart Rate: Rest: 70 bpm, Peak: 151 bpm (97 % max predicted)  Blood Pressure: Rest: 128/84 mmHg, Peak: 172/70 mmHg.  Pretest Chest Pain: None  Symptoms During Stress: No symptoms  Reason for Termination: Generalized fatigue    1. Normal exercise stress echocardiogram.  2. Technically difficult image quality.    CTA heart 2022  CT scan from 2022 Was reviewed and noted significant calcification Of the coronary arteries in particular the LAD territory    Summary:   1. Technically good study.   2. Normal global left ventricular systolic function.   3. Left ventricular ejection fraction, by visual estimation, is 70 to   75%.   4. There is mild concentric left ventricular hypertrophy.   5. Mildly enlarged left atrium.   6. Normal right ventricular size and function.   7. Trace mitral valve regurgitation.   8. There is no evidence of pericardial effusion.    PAST MEDICAL HISTORY  CLL (chronic lymphocytic leukemia)  Hypertension  HLD  cant tolerate statin    PAST SURGICAL HISTORY  H/O hernia repair      SOCIAL HISTORY:   CIGARETTES:   Former smoker d/giovanni age 24  ALCOHOL:  social etoh  DRUGS:  None  Semi retired   Exercises on a regular basis    FAMILY HISTORY:  Brother just had AAA repair  Family History of Cardiovascular Disease:  Yes [x  ] No [  ]  Coronary Artery Disease in first degree relative: Yes [ x ] No [  ]  Sudden Cardiac Death in First degree relative: Yes [  ] No [ x ]      HOME MEDICATIONS:  Losartan 100mg  Norvasc 5mg qd    CURRENT MEDS  amLODIPine   Tablet 10 milliGRAM(s) Oral daily  losartan 25 milliGRAM(s) Oral daily  cefoTEtan  IVPB  cefoTEtan  IVPB    aLLERGIES: nkda    REVIEW OF SYMPTOMS:   CONSTITUTIONAL: No fever, no chills, no weight loss, no weight gain, no fatigue   ENMT:  No vertigo; No sinus or throat pain  NECK: No pain or stiffness  CARDIOVASCULAR: No chest pain, Mild sob with exercise, no syncope/presyncope, no palpitations, no dizziness, no Orthopnea, no Paroxsymal nocturnal dyspnea  RESPIRATORY: no Shortness of breath, no cough, no wheezing  : No dysuria, no hematuria   GI: No dark color stool, no nausea, no diarrhea, no constipation, no abdominal pain   NEURO: No headache, no slurred speech   MUSCULOSKELETAL: No joint pain or swelling; No muscle, back, or extremity pain  PSYCH: No agitation, no anxiety.    ALL OTHER REVIEW OF SYSTEMS ARE NEGATIVE.    Vital Signs Last 24 Hrs  T(C): 36.6 (06 Apr 2024 15:53), Max: 36.9 (06 Apr 2024 03:07)  T(F): 97.8 (06 Apr 2024 15:53), Max: 98.4 (06 Apr 2024 03:07)  HR: 68 (06 Apr 2024 15:53) (68 - 78)  BP: 116/71 (06 Apr 2024 15:53) (100/69 - 131/85)  BP(mean): --  RR: 18 (06 Apr 2024 15:53) (18 - 20)  SpO2: 96% (06 Apr 2024 15:53) (95% - 97%)    Parameters below as of 06 Apr 2024 15:53  Patient On (Oxygen Delivery Method): room air  INTAKE AND OUTPUT:     PHYSICAL EXAM:  Constitutional: Comfortable . No acute distress.   HEENT: Atraumatic and normocephalic , neck is supple . no JVD. No carotid bruit.  CNS: A&Ox3. No focal deficits.   Respiratory: CTAB, unlabored   Cardiovascular: RRR normal s1 s2. No murmur. No rubs or gallop.  Gastrointestinal: Soft, Mild right upper quad tenderness. +Bowel sounds.   MSK: full ROM extremities x 4  Extremities: No edema. No cyanosis   Psychiatric: Calm . no agitation.   Skin: Warm and dry, no ulcers on extremities       LABS:                        14.6   10.23 )-----------( 163      ( 06 Apr 2024 05:11 )             42.7     04-06    137  |  102  |  23.8<H>  ----------------------------<  71  4.8   |  22.0  |  1.12    Ca    8.9      06 Apr 2024 05:11    TPro  6.5<L>  /  Alb  3.4  /  TBili  0.7  /  DBili  x   /  AST  37  /  ALT  57<H>  /  AlkPhos  339<H>  04-06    PT/INR - ( 06 Apr 2024 12:20 )   PT: 10.8 sec;   INR: 0.97 ratio       PTT - ( 06 Apr 2024 12:20 )  PTT:31.4 sec  Urinalysis Basic - ( 06 Apr 2024 05:11 )    Color: x / Appearance: x / SG: x / pH: x  Gluc: 71 mg/dL / Ketone: x  / Bili: x / Urobili: x   Blood: x / Protein: x / Nitrite: x   Leuk Esterase: x / RBC: x / WBC x   Sq Epi: x / Non Sq Epi: x / Bacteria: x    INTERPRETATION OF TELEMETRY:     ECG: NSR st changes in lateral leads  Unchanged when compared to ekg 6/23  Prior ECG: Yes [  ] No [  ]      RADIOLOGY & ADDITIONAL STUDIES:    X-ray:  reviewed by me. cxr napd

## 2024-04-06 NOTE — ED CDU PROVIDER DISPOSITION NOTE - ATTENDING CONTRIBUTION TO CARE
I, Miguel Ángel Lemos MD, performed the initial face to face bedside interview with this patient regarding history of present illness, review of symptoms and relevant past medical, social and family history.  I completed an independent physical examination.  I was the initial provider who evaluated this patient. I have signed out the follow up of any pending tests (i.e. labs, radiological studies) to the ACP.  I have communicated the patient’s plan of care and disposition with the ACP.    labs and diagnostic imaging results reviewed with patient; surgery recommend admission

## 2024-04-06 NOTE — ED CDU PROVIDER DISPOSITION NOTE - CLINICAL COURSE
65 y/o  M with cholecystitis - MRCP positive for stone in CBD - as per surgery admit for OR tomorrow am

## 2024-04-07 LAB
ALBUMIN SERPL ELPH-MCNC: 3.3 G/DL — SIGNIFICANT CHANGE UP (ref 3.3–5.2)
ALP SERPL-CCNC: 331 U/L — HIGH (ref 40–120)
ALT FLD-CCNC: 45 U/L — HIGH
ANION GAP SERPL CALC-SCNC: 16 MMOL/L — SIGNIFICANT CHANGE UP (ref 5–17)
AST SERPL-CCNC: 24 U/L — SIGNIFICANT CHANGE UP
BACTERIA UR CULT: NORMAL
BASOPHILS # BLD AUTO: 0.09 K/UL — SIGNIFICANT CHANGE UP (ref 0–0.2)
BASOPHILS NFR BLD AUTO: 1 % — SIGNIFICANT CHANGE UP (ref 0–2)
BILIRUB DIRECT SERPL-MCNC: 0.1 MG/DL — SIGNIFICANT CHANGE UP (ref 0–0.3)
BILIRUB INDIRECT FLD-MCNC: 0.3 MG/DL — SIGNIFICANT CHANGE UP (ref 0.2–1)
BILIRUB SERPL-MCNC: 0.4 MG/DL — SIGNIFICANT CHANGE UP (ref 0.4–2)
BUN SERPL-MCNC: 20.4 MG/DL — HIGH (ref 8–20)
CALCIUM SERPL-MCNC: 8.5 MG/DL — SIGNIFICANT CHANGE UP (ref 8.4–10.5)
CHLORIDE SERPL-SCNC: 100 MMOL/L — SIGNIFICANT CHANGE UP (ref 96–108)
CO2 SERPL-SCNC: 20 MMOL/L — LOW (ref 22–29)
CREAT SERPL-MCNC: 1.08 MG/DL — SIGNIFICANT CHANGE UP (ref 0.5–1.3)
CULTURE RESULTS: NO GROWTH — SIGNIFICANT CHANGE UP
EGFR: 77 ML/MIN/1.73M2 — SIGNIFICANT CHANGE UP
EOSINOPHIL # BLD AUTO: 0.37 K/UL — SIGNIFICANT CHANGE UP (ref 0–0.5)
EOSINOPHIL NFR BLD AUTO: 4.1 % — SIGNIFICANT CHANGE UP (ref 0–6)
GLUCOSE SERPL-MCNC: 102 MG/DL — HIGH (ref 70–99)
HCT VFR BLD CALC: 41 % — SIGNIFICANT CHANGE UP (ref 39–50)
HGB BLD-MCNC: 14.1 G/DL — SIGNIFICANT CHANGE UP (ref 13–17)
IMM GRANULOCYTES NFR BLD AUTO: 0.4 % — SIGNIFICANT CHANGE UP (ref 0–0.9)
LYMPHOCYTES # BLD AUTO: 1.49 K/UL — SIGNIFICANT CHANGE UP (ref 1–3.3)
LYMPHOCYTES # BLD AUTO: 16.7 % — SIGNIFICANT CHANGE UP (ref 13–44)
MAGNESIUM SERPL-MCNC: 2.4 MG/DL — SIGNIFICANT CHANGE UP (ref 1.8–2.6)
MCHC RBC-ENTMCNC: 28.5 PG — SIGNIFICANT CHANGE UP (ref 27–34)
MCHC RBC-ENTMCNC: 34.4 GM/DL — SIGNIFICANT CHANGE UP (ref 32–36)
MCV RBC AUTO: 83 FL — SIGNIFICANT CHANGE UP (ref 80–100)
MONOCYTES # BLD AUTO: 1.05 K/UL — HIGH (ref 0–0.9)
MONOCYTES NFR BLD AUTO: 11.8 % — SIGNIFICANT CHANGE UP (ref 2–14)
NEUTROPHILS # BLD AUTO: 5.88 K/UL — SIGNIFICANT CHANGE UP (ref 1.8–7.4)
NEUTROPHILS NFR BLD AUTO: 66 % — SIGNIFICANT CHANGE UP (ref 43–77)
PHOSPHATE SERPL-MCNC: 3.2 MG/DL — SIGNIFICANT CHANGE UP (ref 2.4–4.7)
PLATELET # BLD AUTO: 194 K/UL — SIGNIFICANT CHANGE UP (ref 150–400)
POTASSIUM SERPL-MCNC: 4 MMOL/L — SIGNIFICANT CHANGE UP (ref 3.5–5.3)
POTASSIUM SERPL-SCNC: 4 MMOL/L — SIGNIFICANT CHANGE UP (ref 3.5–5.3)
PROT SERPL-MCNC: 6.5 G/DL — LOW (ref 6.6–8.7)
RBC # BLD: 4.94 M/UL — SIGNIFICANT CHANGE UP (ref 4.2–5.8)
RBC # FLD: 13.6 % — SIGNIFICANT CHANGE UP (ref 10.3–14.5)
SODIUM SERPL-SCNC: 136 MMOL/L — SIGNIFICANT CHANGE UP (ref 135–145)
SPECIMEN SOURCE: SIGNIFICANT CHANGE UP
WBC # BLD: 8.92 K/UL — SIGNIFICANT CHANGE UP (ref 3.8–10.5)
WBC # FLD AUTO: 8.92 K/UL — SIGNIFICANT CHANGE UP (ref 3.8–10.5)

## 2024-04-07 PROCEDURE — 99231 SBSQ HOSP IP/OBS SF/LOW 25: CPT | Mod: GC

## 2024-04-07 RX ORDER — INDOCYANINE GREEN 25 MG
2.5 KIT INTRAVASCULAR; INTRAVENOUS ONCE
Refills: 0 | Status: DISCONTINUED | OUTPATIENT
Start: 2024-04-07 | End: 2024-04-08

## 2024-04-07 RX ADMIN — AMLODIPINE BESYLATE 10 MILLIGRAM(S): 2.5 TABLET ORAL at 05:09

## 2024-04-07 RX ADMIN — Medication 100 GRAM(S): at 17:37

## 2024-04-07 RX ADMIN — LOSARTAN POTASSIUM 25 MILLIGRAM(S): 100 TABLET, FILM COATED ORAL at 05:09

## 2024-04-07 RX ADMIN — Medication 100 GRAM(S): at 05:10

## 2024-04-07 NOTE — PROGRESS NOTE ADULT - ATTENDING COMMENTS
Above assessment noted.  The patient was seen and examined by myself with the surgical PA and resident. The patient remains was without abdominal pain.  MRCP without CBD stones only inflammation of the gallbladder consistent with acute cholecystitis.  Abdomen is soft without tenderness, no guarding, no rebound.  The patient will need to hold Imbruvica for 7 days prior to surgery.  Medical and cardiac evaluations pre-op.  Plan for robotic/lap/open kristin Friday.  The risks, benefits, and alternatives including the option of doing nothing to a robotic, possible laparoscopic, and possible open cholecystectomy were discussed.  The potential complications including but not limited to infection, bleeding, bowel injury and/or obstruction, chronic post operative pain, bile leak, and bile duct injury were discussed.  The patient admitted understanding and agrees to proceed as planned.

## 2024-04-08 ENCOUNTER — TRANSCRIPTION ENCOUNTER (OUTPATIENT)
Age: 64
End: 2024-04-08

## 2024-04-08 VITALS
TEMPERATURE: 98 F | DIASTOLIC BLOOD PRESSURE: 88 MMHG | OXYGEN SATURATION: 95 % | RESPIRATION RATE: 18 BRPM | SYSTOLIC BLOOD PRESSURE: 150 MMHG | HEART RATE: 71 BPM

## 2024-04-08 LAB
ALBUMIN SERPL ELPH-MCNC: 3.3 G/DL — SIGNIFICANT CHANGE UP (ref 3.3–5.2)
ALP SERPL-CCNC: 270 U/L — HIGH (ref 40–120)
ALT FLD-CCNC: 35 U/L — SIGNIFICANT CHANGE UP
ANION GAP SERPL CALC-SCNC: 13 MMOL/L — SIGNIFICANT CHANGE UP (ref 5–17)
AST SERPL-CCNC: 20 U/L — SIGNIFICANT CHANGE UP
BILIRUB DIRECT SERPL-MCNC: 0.1 MG/DL — SIGNIFICANT CHANGE UP (ref 0–0.3)
BILIRUB INDIRECT FLD-MCNC: 0.2 MG/DL — SIGNIFICANT CHANGE UP (ref 0.2–1)
BILIRUB SERPL-MCNC: 0.2 MG/DL — LOW (ref 0.4–2)
BUN SERPL-MCNC: 13.7 MG/DL — SIGNIFICANT CHANGE UP (ref 8–20)
CALCIUM SERPL-MCNC: 8.8 MG/DL — SIGNIFICANT CHANGE UP (ref 8.4–10.5)
CHLORIDE SERPL-SCNC: 101 MMOL/L — SIGNIFICANT CHANGE UP (ref 96–108)
CO2 SERPL-SCNC: 23 MMOL/L — SIGNIFICANT CHANGE UP (ref 22–29)
CREAT SERPL-MCNC: 0.96 MG/DL — SIGNIFICANT CHANGE UP (ref 0.5–1.3)
EGFR: 88 ML/MIN/1.73M2 — SIGNIFICANT CHANGE UP
GLUCOSE SERPL-MCNC: 114 MG/DL — HIGH (ref 70–99)
HCT VFR BLD CALC: 42.5 % — SIGNIFICANT CHANGE UP (ref 39–50)
HCV AB S/CO SERPL IA: 0.1 S/CO — SIGNIFICANT CHANGE UP (ref 0–0.99)
HCV AB SERPL-IMP: SIGNIFICANT CHANGE UP
HGB BLD-MCNC: 14.4 G/DL — SIGNIFICANT CHANGE UP (ref 13–17)
MAGNESIUM SERPL-MCNC: 2.3 MG/DL — SIGNIFICANT CHANGE UP (ref 1.6–2.6)
MCHC RBC-ENTMCNC: 28.7 PG — SIGNIFICANT CHANGE UP (ref 27–34)
MCHC RBC-ENTMCNC: 33.9 GM/DL — SIGNIFICANT CHANGE UP (ref 32–36)
MCV RBC AUTO: 84.7 FL — SIGNIFICANT CHANGE UP (ref 80–100)
PHOSPHATE SERPL-MCNC: 3.3 MG/DL — SIGNIFICANT CHANGE UP (ref 2.4–4.7)
PLATELET # BLD AUTO: 212 K/UL — SIGNIFICANT CHANGE UP (ref 150–400)
POTASSIUM SERPL-MCNC: 4.1 MMOL/L — SIGNIFICANT CHANGE UP (ref 3.5–5.3)
POTASSIUM SERPL-SCNC: 4.1 MMOL/L — SIGNIFICANT CHANGE UP (ref 3.5–5.3)
PROT SERPL-MCNC: 6.4 G/DL — LOW (ref 6.6–8.7)
RBC # BLD: 5.02 M/UL — SIGNIFICANT CHANGE UP (ref 4.2–5.8)
RBC # FLD: 13.4 % — SIGNIFICANT CHANGE UP (ref 10.3–14.5)
SODIUM SERPL-SCNC: 137 MMOL/L — SIGNIFICANT CHANGE UP (ref 135–145)
WBC # BLD: 7.45 K/UL — SIGNIFICANT CHANGE UP (ref 3.8–10.5)
WBC # FLD AUTO: 7.45 K/UL — SIGNIFICANT CHANGE UP (ref 3.8–10.5)

## 2024-04-08 PROCEDURE — 80053 COMPREHEN METABOLIC PANEL: CPT

## 2024-04-08 PROCEDURE — 80076 HEPATIC FUNCTION PANEL: CPT

## 2024-04-08 PROCEDURE — 86901 BLOOD TYPING SEROLOGIC RH(D): CPT

## 2024-04-08 PROCEDURE — 96374 THER/PROPH/DIAG INJ IV PUSH: CPT

## 2024-04-08 PROCEDURE — 74183 MRI ABD W/O CNTR FLWD CNTR: CPT | Mod: MC

## 2024-04-08 PROCEDURE — 80048 BASIC METABOLIC PNL TOTAL CA: CPT

## 2024-04-08 PROCEDURE — 87086 URINE CULTURE/COLONY COUNT: CPT

## 2024-04-08 PROCEDURE — 76705 ECHO EXAM OF ABDOMEN: CPT

## 2024-04-08 PROCEDURE — 83735 ASSAY OF MAGNESIUM: CPT

## 2024-04-08 PROCEDURE — 85025 COMPLETE CBC W/AUTO DIFF WBC: CPT

## 2024-04-08 PROCEDURE — 86850 RBC ANTIBODY SCREEN: CPT

## 2024-04-08 PROCEDURE — 83690 ASSAY OF LIPASE: CPT

## 2024-04-08 PROCEDURE — 85610 PROTHROMBIN TIME: CPT

## 2024-04-08 PROCEDURE — 85730 THROMBOPLASTIN TIME PARTIAL: CPT

## 2024-04-08 PROCEDURE — 81001 URINALYSIS AUTO W/SCOPE: CPT

## 2024-04-08 PROCEDURE — 82248 BILIRUBIN DIRECT: CPT

## 2024-04-08 PROCEDURE — 86900 BLOOD TYPING SEROLOGIC ABO: CPT

## 2024-04-08 PROCEDURE — 99231 SBSQ HOSP IP/OBS SF/LOW 25: CPT

## 2024-04-08 PROCEDURE — 93005 ELECTROCARDIOGRAM TRACING: CPT

## 2024-04-08 PROCEDURE — 36415 COLL VENOUS BLD VENIPUNCTURE: CPT

## 2024-04-08 PROCEDURE — 96376 TX/PRO/DX INJ SAME DRUG ADON: CPT

## 2024-04-08 PROCEDURE — 84100 ASSAY OF PHOSPHORUS: CPT

## 2024-04-08 PROCEDURE — G0378: CPT

## 2024-04-08 PROCEDURE — 71045 X-RAY EXAM CHEST 1 VIEW: CPT

## 2024-04-08 PROCEDURE — 99285 EMERGENCY DEPT VISIT HI MDM: CPT

## 2024-04-08 PROCEDURE — 85027 COMPLETE CBC AUTOMATED: CPT

## 2024-04-08 PROCEDURE — 86803 HEPATITIS C AB TEST: CPT

## 2024-04-08 RX ADMIN — Medication 100 GRAM(S): at 17:37

## 2024-04-08 RX ADMIN — Medication 100 GRAM(S): at 05:02

## 2024-04-08 NOTE — CONSULT NOTE ADULT - ASSESSMENT
incomplete     64y Male with PMh of CLL on imbruvia and PSH of umbilical hernia repair reports that he has had generalized abdominal pain and malaise for about 1 week and then yesterday had very dark urine that made him go to an urgent care. Found to have colicystitis planning for cholecystectomy. Heme consulted for OR clearance     4/6/24- MRCP w/ wo IVC- Redemonstrated acute cholecystitis. No biliary ductal dilatation or evidence of choledocholithiasis.Redemonstrated acute diverticulitis of the descending colon.    #CLL  -CLL is CD38+, ZAP-70+, unmut IGVH, TP53 mut.  -patient w. hx of CLL treated at [facility] on [id number] trial (Imbruvica or Placebo),  Unblinded 2/2023), s/p tx  MRD cohort of study (Venetoclax + Imbruvica) now on maintenance Imbruvica only seen by Dr. Smith  -patient now presenting to [facility] w/ acute cholecystitis elevated LFT's  -SX onboard plan for cholecystectomy plan for 4/12  -to note patient w. CBC WNL, (hgb 14.4, wbc 7.45, plt 212)  RECS        Thank you for the referral. Will continue to monitor the patient.  Please call with any questions [phone]  Above reviewed with Attending Dr. Garcia   [facility]  [address] [place] [address]  [phone]  *Note not finalized until signed by Attending Physician   incomplete     64y Male with PMh of CLL on imbruvia and PSH of umbilical hernia repair reports that he has had generalized abdominal pain and malaise for about 1 week and then yesterday had very dark urine that made him go to an urgent care. Found to have colicystitis planning for cholecystectomy. Heme consulted for OR clearance     4/6/24- MRCP w/ wo IVC- Redemonstrated acute cholecystitis. No biliary ductal dilatation or evidence of choledocholithiasis. Redemonstrated acute diverticulitis of the descending colon.    #CLL  -CLL is CD38+, ZAP-70+, unmut IGVH, TP53 mut.  -patient w. hx of CLL treated at [facility] on [id number] trial (Imbruvica or Placebo),  Unblinded 2/2023), s/p tx  MRD cohort of study (Venetoclax + Imbruvica) now on maintenance Imbruvica only seen by Dr. Smith  -patient now presenting to [facility] w/ acute cholecystitis elevated LFT's  -SX onboard plan for cholecystectomy plan for 4/12  -to note patient w. CBC WNL, (hgb 14.4, wbc 7.45, plt 212)  RECS  -Imbruvica being held since (4/3) can resume tx 4 days post-op   -no absolute contraindication for sx on 4/12 from hematologic standpoint  -patient can f/u with Dr. Smith upon d/c         Thank you for the referral. Will continue to monitor the patient.  Please call with any questions [phone]  Above reviewed with Attending Dr. Garcia   [facility]  [facility] [place] [address]  [phone]  *Note not finalized until signed by Attending Physician    64y Male with PMh of CLL on imbruvia and PSH of umbilical hernia repair reports that he has had generalized abdominal pain and malaise for about 1 week and then yesterday had very dark urine that made him go to an urgent care. Found to have colicystitis planning for cholecystectomy. Heme consulted for OR clearance     4/6/24- MRCP w/ wo IVC- Redemonstrated acute cholecystitis. No biliary ductal dilatation or evidence of choledocholithiasis. Redemonstrated acute diverticulitis of the descending colon.    #CLL  -CLL is CD38+, ZAP-70+, unmut IGVH, TP53 mut.  -patient w. hx of CLL treated at [facility] on [id number] trial (Imbruvica or Placebo),  Unblinded 2/2023), s/p tx  MRD cohort of study (Venetoclax + Imbruvica) now on maintenance Imbruvica only seen by Dr. Smith  -patient now presenting to [facility] w/ acute cholecystitis elevated LFT's  -SX onboard plan for cholecystectomy plan for 4/12  -to note patient w. CBC WNL, (hgb 14.4, wbc 7.45, plt 212)  RECS  -Imbruvica being held since (4/3) can resume tx 4 days post-op   -no absolute contraindication for sx on 4/12 from hematologic standpoint  -patient can f/u with Dr. Smith upon d/c         Thank you for the referral. Will continue to monitor the patient.  Please call with any questions [phone]  Above reviewed with Attending Dr. Garcia   [facility]  [address] [place] [address]  [phone]  *Note not finalized until signed by Attending Physician

## 2024-04-08 NOTE — PROGRESS NOTE ADULT - ATTENDING COMMENTS
I have seen and examined the patient during rounds form [id number] hrs  events noted    no new issues  abd is being  tolerating diet  will discuss possibility of d/c and outpatient surgery on 4/12

## 2024-04-08 NOTE — DISCHARGE NOTE PROVIDER - HOSPITAL COURSE
Patient is a 64yoM with PMH CLL (on imbruvica) who presented to the ED on4/6 with 1 week of generalized abdominal pain and malaise, imaging in the ER showed acute cholecystitis. There was initial concern for choledocholithiasis, MRCP performed and only showed cholelithiasis. Patient admitted, plan for imbruvica to be held for 7 days prior to operative intervention. Patient's diet was advanced as tolerated, and was placed on IV antibiotics. Patient mentioned he had to leave and take care of issues at home, patient to be booked for robot-assisted cholecystectomy on friday as outpatient. At this time, pain is controlled, he is tolerating diet, voiding freely and ambulating    Patient instructed to continue to hold the imbruvica, present on friday to same day surgery for robot-assisted cholecystectomy.

## 2024-04-08 NOTE — CONSULT NOTE ADULT - NS ATTEND AMEND GEN_ALL_CORE FT
I have seen and examined this patient, and agree with the above.  64M with HTN and CLL who presents with abdominal pain and "darkened urine".  Found to have acute cholecystitis and possible biliary obstruction.  However, he is requesting to not have surgery.  Patient will need MRCP and cholecystectomy, admission (if he agrees), and ABx.
Agree with above assessment and plan.  Hold imbruvica as delineated above
63 y/o male with PMH of CLL on ibrutinib  CAD by CTA 2022, negative stress echo 3/24 who presents to the hospital with abdominal discomfort for 2 weeks initially thinking it was a viral syndrome. Found to have acute cholecystitis. No biliary ductal dilatation or evidence of choledocholithiasis on mrcp and is scheduled for Choley.  We were asked to risk stratify patient for surgery  recent stress echo he exercised to 9.4 Mets had no cp and study was read as normal  No recent episodes of chest pain    preoperative cardiac risk stratification  Hx of CAD by CTA 2022  pre-op cholecystectomy  hypertension  hx of CLL on ibrutinib      hypertension controlled  EKG: sinus non ischemic  telemetry appropriate  cardiac symptom free    recent Stress echo 03/2024 without ischemia.    PREOPERATIVE CLEARANCE:  Patient may be planned for further surgical intervention by surgery    ACC/AHA guideline summary: Cardiac risk stratification for noncardiac surgical procedures    Intermediate risk (reported risk of cardiac death or nonfatal MI generally 1 to 5%)  Intraperitoneal and intrathoracic surgery      Patient is a RCRI class 1 risk for clay and postoperative cardiac complications. DASI calculator: >4 METS. We would recommend postoperative EKG. Optimal electrolyte management.  There is no evidence of active ACS, decompensated HF, uncontrolled arrhythmias or significant valvular heart disease at present. EKG is non-ischemic.    The patient is at a medium but not prohibitive risk to proceed with planned  intraabdominal surgery.  Additional CV testing is not indicated as it would not  clay-operative period.    we will follow peripherally.

## 2024-04-08 NOTE — CONSULT NOTE ADULT - SUBJECTIVE AND OBJECTIVE BOX
Hematology Consult Note    HPI:  HPI: 64y Male with PMh of CLL on imbruvia and PSH of umbilical hernia repair reports that he has had generalized abdominal pain and malaise for about 1 week and then yesterday had very dark urine that made him go to an urgent care. In UC patient had a UA with presence of bilirubin and was sent for a CT that showed concern for acute cholecystitis and was sent to the ED. In ED patient reports that he does not have abdominal pain and has minimal tenderness, has been able to eat and urine is getting better.       Allergies    No Known Allergies    Intolerances        MEDICATIONS  (STANDING):  amLODIPine   Tablet 10 milliGRAM(s) Oral daily  cefoTEtan  IVPB 2 Gram(s) IV Intermittent every 12 hours  cefoTEtan  IVPB      indocyanine green Injectable 2.5 milliGRAM(s) IV Push once  losartan 25 milliGRAM(s) Oral daily    MEDICATIONS  (PRN):  acetaminophen     Tablet .. 975 milliGRAM(s) Oral every 6 hours PRN Mild Pain (1 - 3)      PAST MEDICAL & SURGICAL HISTORY:  CLL (chronic lymphocytic leukemia)      Hypertension      H/O hernia repair          FAMILY HISTORY:      SOCIAL HISTORY: No EtOH, no tobacco    REVIEW OF SYSTEMS:    CONSTITUTIONAL: No weakness, fevers or chills  EYES/ENT: No visual changes;  No vertigo or throat pain   NECK: No pain or stiffness  RESPIRATORY: No cough, wheezing, hemoptysis; No shortness of breath  CARDIOVASCULAR: No chest pain or palpitations  GASTROINTESTINAL: mild abd pain, and diarrhea No nausea, vomiting, or hematemesis  GENITOURINARY: No dysuria, frequency or hematuria  NEUROLOGICAL: No numbness or weakness  SKIN: No itching, burning, rashes, or lesions   All other review of systems is negative unless indicated above.        T(F): 98 (04-08-24 @ 04:24), Max: 98 (04-08-24 @ 04:24)  HR: 58 (04-08-24 @ 04:24)  BP: 108/67 (04-08-24 @ 04:24)  RR: 18 (04-08-24 @ 04:24)  SpO2: 97% (04-08-24 @ 04:24)  Wt(kg): --    GENERAL: NAD, well-developed  HEAD:  Atraumatic, Normocephalic  EYES: EOMI, PERRLA, conjunctiva and sclera clear  NECK: Supple, No JVD  CHEST/LUNG: Clear to auscultation bilaterally; No wheeze  HEART: Regular rate and rhythm; No murmurs, rubs, or gallops  ABDOMEN: gen tenderness, slightly distended   EXTREMITIES:  2+ Peripheral Pulses, No clubbing, cyanosis, or edema  NEUROLOGY: non-focal  SKIN: No rashes or lesions                          14.4   7.45  )-----------( 212      ( 08 Apr 2024 07:12 )             42.5       04-08    137  |  101  |  13.7  ----------------------------<  114<H>  4.1   |  23.0  |  0.96    Ca    8.8      08 Apr 2024 07:12  Phos  3.3     04-08  Mg     2.3     04-08    TPro  6.4<L>  /  Alb  3.3  /  TBili  0.2<L>  /  DBili  0.1  /  AST  20  /  ALT  35  /  AlkPhos  270<H>  04-08      Magnesium: 2.3 mg/dL (04-08 @ 07:12)  Phosphorus: 3.3 mg/dL (04-08 @ 07:12)      < from: MR MRCP w/wo IV Cont (04.06.24 @ 08:43) >  ACC: [id number] EXAM:  MR MRCP WAW IC   ORDERED BY: ROSALIO DARDEN     PROCEDURE DATE:  04/06/2024          INTERPRETATION:  CLINICAL INFORMATION: Cholecystitis. Evaluate for stones.    COMPARISON: CT abdomen pelvis 4/4/2024. Right upper quadrant ultrasound   4/5/2024.    CONTRAST/COMPLICATIONS:  IV Contrast: Gadavist  8 cc administered   2 cc discarded  Oral Contrast: NONE  Complications: None reported at time of study completion    PROCEDURE:  MRI of the abdomen was performed.  MRCP was performed.    FINDINGS:  LOWER CHEST: Within normal limits.    LIVER: Geographic hyperenhancement in the left hepatic lobe, which may be   reactive.  BILE DUCTS: Normal caliber. No evidence of choledocholithiasis.  GALLBLADDER: Distended with multiple gallstones including a 9 mm stone   lodged in the neck. Gallbladder wall thickening and pericholecystic   fluid, compatible with acute cholecystitis.  SPLEEN: Within normal limits.  PANCREAS: Pancreatic divisum.  ADRENALS: Within normal limits.  KIDNEYS/URETERS: Numerous right renal cysts, some of which are   hemorrhagic.    VISUALIZED PORTIONS:  BOWEL: Colonic diverticulosis. Redemonstrated focal fat stranding and   wall thickening involving the mid descending colon, compatible with   diverticulitis.  PERITONEUM: No ascites.  VESSELS: Within normal limits.  RETROPERITONEUM/LYMPH NODES: No lymphadenopathy.  ABDOMINAL WALL: Within normal limits.  BONES: Within normal limits.    IMPRESSION:  Redemonstrated acute cholecystitis. No biliary ductal dilatation or   evidence of choledocholithiasis.    Redemonstrated acute diverticulitis of the descending colon.    --- End of Report ---

## 2024-04-08 NOTE — DISCHARGE NOTE NURSING/CASE MANAGEMENT/SOCIAL WORK - PATIENT PORTAL LINK FT
You can access the FollowMyHealth Patient Portal offered by [facility] by registering at the following website: [email or web address]. By joining [facility]’s FollowMyHealth portal, you will also be able to view your health information using other applications (apps) compatible with our system.

## 2024-04-08 NOTE — DISCHARGE NOTE PROVIDER - NSFOLLOWUPCLINICS_GEN_ALL_ED_FT
[facility]  Acute Care Surgery  [address] [place] [id number]  Phone: [phone]  Fax:

## 2024-04-08 NOTE — PROGRESS NOTE ADULT - SUBJECTIVE AND OBJECTIVE BOX
Patient seen and examined at bedside. No acute events overnight    Vitals:  Vital Signs Last 24 Hrs  T(C): 36.6 (07 Apr 2024 04:00), Max: 36.8 (06 Apr 2024 07:35)  T(F): 97.9 (07 Apr 2024 04:00), Max: 98.2 (06 Apr 2024 07:35)  HR: 64 (07 Apr 2024 05:05) (60 - 78)  BP: 117/73 (07 Apr 2024 04:00) (108/62 - 137/76)  BP(mean): --  RR: 18 (07 Apr 2024 04:00) (17 - 19)  SpO2: 96% (07 Apr 2024 04:00) (95% - 97%)    Parameters below as of 07 Apr 2024 04:00  Patient On (Oxygen Delivery Method): room air        Labs:  04-06    137  |  102  |  23.8<H>  ----------------------------<  71  4.8   |  22.0  |  1.12    Ca    8.9      06 Apr 2024 05:11    TPro  6.5<L>  /  Alb  3.4  /  TBili  0.7  /  DBili  x   /  AST  37  /  ALT  57<H>  /  AlkPhos  339<H>  04-06                            14.6   10.23 )-----------( 163      ( 06 Apr 2024 05:11 )             42.7       Exam:  Gen: pt lying in bed, alert, in NAD  Resp: unlabored  CVS: RRR  Abd: soft, NT, ND  Ext: moving all extremities spontaneously, sensation intact, pulses 2+ 
Subjective: Patient seen and examined at bedside, denies abdominal pain, nausea, vomiting, fever, chills.     MEDICATIONS  (STANDING):  amLODIPine   Tablet 10 milliGRAM(s) Oral daily  cefoTEtan  IVPB      cefoTEtan  IVPB 2 Gram(s) IV Intermittent every 12 hours  losartan 25 milliGRAM(s) Oral daily    Vital Signs Last 24 Hrs  T(C): 36.3 (05 Apr 2024 23:17), Max: 36.7 (05 Apr 2024 14:57)  T(F): 97.4 (05 Apr 2024 23:17), Max: 98 (05 Apr 2024 14:57)  HR: 72 (05 Apr 2024 23:17) (65 - 72)  BP: 108/71 (05 Apr 2024 23:17) (108/71 - 132/78)  BP(mean): --  RR: 20 (05 Apr 2024 23:17) (20 - 20)  SpO2: 97% (05 Apr 2024 23:17) (97% - 98%)  Parameters below as of 05 Apr 2024 23:17  Patient On (Oxygen Delivery Method): room air    Physical Exam:  Constitutional: NAD  HEENT: PERRL, EOMI  Respiratory: Respirations non-labored, no accessory muscle use  Gastrointestinal: Soft, non-tender, non-distended, negative Epstein's sign   Neurological: A&O x 3; without gross deficit    LABS:                     14.9   11.77 )-----------( 174      ( 05 Apr 2024 12:47 )             44.1   04-05  131<L>  |  96  |  23.3<H>  ----------------------------<  82  4.3   |  22.0  |  1.15  Ca    9.2      05 Apr 2024 12:47  TPro  7.2  /  Alb  3.7  /  TBili  0.9  /  DBili  0.3  /  AST  52<H>  /  ALT  70<H>  /  AlkPhos  398<H>  04-05    A: Patient is a 63 yo M with a mhx of htn and CLL, who presented with abdominal pain and "dark brown urine", found to have acute cholecystits and a possible biliary obstruction.     Plan:   MRCP still pending   AM labs, trend bili, LFTs, wbc   NPO, IVFs   cont cefotetan       
HPI/OVERNIGHT EVENTS:  NAEON. Patient denies any abdominal pain, fever/chills, CP and/or SOB. Endores diarrhea ( 2 episodes) . Tolerating minimal PO. HDS and afebrile.     MEDICATIONS  (STANDING):  amLODIPine   Tablet 10 milliGRAM(s) Oral daily  cefoTEtan  IVPB      cefoTEtan  IVPB 2 Gram(s) IV Intermittent every 12 hours  indocyanine green Injectable 2.5 milliGRAM(s) IV Push once  losartan 25 milliGRAM(s) Oral daily    MEDICATIONS  (PRN):  acetaminophen     Tablet .. 975 milliGRAM(s) Oral every 6 hours PRN Mild Pain (1 - 3)      Vital Signs Last 24 Hrs  T(C): 36.7 (08 Apr 2024 04:24), Max: 36.7 (08 Apr 2024 04:24)  T(F): 98 (08 Apr 2024 04:24), Max: 98 (08 Apr 2024 04:24)  HR: 58 (08 Apr 2024 04:24) (58 - 64)  BP: 108/67 (08 Apr 2024 04:24) (107/62 - 131/70)  BP(mean): --  RR: 18 (08 Apr 2024 04:24) (18 - 18)  SpO2: 97% (08 Apr 2024 04:24) (95% - 98%)    Parameters below as of 08 Apr 2024 04:24  Patient On (Oxygen Delivery Method): room air        Constitutional: patient resting comfortably in bed, in no acute distress  Respiratory: respirations are unlabored, no accessory muscle use, no conversational dyspnea  Cardiovascular: regular rate & rhythm  Gastrointestinal: Abdomen soft, mildly tender in RUQ, no [place] sign  non-distended, no rebound tenderness / guarding  Neurological: A&O x 3      I&O's Detail    07 Apr 2024 07:01  -  08 Apr 2024 07:00  --------------------------------------------------------  IN:    IV PiggyBack: 150 mL    Oral Fluid: 550 mL  Total IN: 700 mL    OUT:  Total OUT: 0 mL    Total NET: 700 mL          LABS:                        14.1   8.92  )-----------( 194      ( 07 Apr 2024 07:04 )             41.0     04-07    136  |  100  |  20.4<H>  ----------------------------<  102<H>  4.0   |  20.0<L>  |  1.08    Ca    8.5      07 Apr 2024 07:04  Phos  3.2     04-07  Mg     2.4     04-07    TPro  6.5<L>  /  Alb  3.3  /  TBili  0.4  /  DBili  0.1  /  AST  24  /  ALT  45<H>  /  AlkPhos  331<H>  04-07    PT/INR - ( 06 Apr 2024 12:20 )   PT: 10.8 sec;   INR: 0.97 ratio         PTT - ( 06 Apr 2024 12:20 )  PTT:31.4 sec  Urinalysis Basic - ( 07 Apr 2024 07:04 )    Color: x / Appearance: x / SG: x / pH: x  Gluc: 102 mg/dL / Ketone: x  / Bili: x / Urobili: x   Blood: x / Protein: x / Nitrite: x   Leuk Esterase: x / RBC: x / WBC x   Sq Epi: x / Non Sq Epi: x / Bacteria: x

## 2024-04-08 NOTE — PROGRESS NOTE ADULT - ASSESSMENT
A: 63 yo M sent for concern of acute cholecystitis, MRCP negative for choledocholithiasis, positive for acute cholecystitis    Plan:  -OR friday 4/12 for robo assisted cholecystectomy       Given hx of CLL on Imbruvica (held)  , recommended to wait for intervention   - Heme Onc consultation, f/u recs   - CLD w/IVF   -CARDS consult for risk stratification - intermediate risk. Post-op EKG   -pain control  -strict Is/Os  -continue home meds  -trend labs, replete electrolytes as needed  -encourage OOB  -incentive spirometry  -DVT ppx: SCDs
63 yo M sent for concern of acute cholecystitis, MRCP negative for choledocholithiasis, positive for acute cholecystitis    Plan:  -patient amenable to cholecystectomy  -CLD   -added on to OR friday 4/12 for robo assisted cholecystectomy  -cardiology eval for medical optimization - optimized for surgery  -heme/onc consult  -holding imbruvica  -pain control  -strict Is/Os  -continue home meds  -trend labs, replete electrolytes as needed  -encourage OOB  -incentive spirometry  -DVT ppx: SCDs

## 2024-04-08 NOTE — DISCHARGE NOTE PROVIDER - NSDCCPCAREPLAN_GEN_ALL_CORE_FT
PRINCIPAL DISCHARGE DIAGNOSIS  Diagnosis: Acute cholecystitis  Assessment and Plan of Treatment: -can take Tylenol 975mg every 6 hours as needed for pain  -continue home medications  -resume a low fat diet  -please take augmentin 875mg every 12 hours as prescribed  -please hold your imburvica medication  -expect a call from Pre-Surgical testing, plan to present to [facility] on friday 4/12 for robot-assisted cholecystectomy  -can call surgery office to make appointment and with questions or concerns  -if development of severe pain, intracable nausea/vomiting, fevers, jaundice, present to ED

## 2024-04-08 NOTE — DISCHARGE NOTE PROVIDER - NSDCMRMEDTOKEN_GEN_ALL_CORE_FT
amoxicillin-clavulanate 875 mg-125 mg oral tablet: 875 milligram(s) orally every 12 hours  losartan 50 mg oral tablet: 1 tab(s) orally once a day

## 2024-04-08 NOTE — DISCHARGE NOTE NURSING/CASE MANAGEMENT/SOCIAL WORK - NSDCPEFALRISK_GEN_ALL_CORE
For information on Fall & Injury Prevention, visit: [email or web address] OR  [email or web address] OR  https://www.cdc.gov/steadi/patient.html

## 2024-04-09 RX ORDER — LOSARTAN POTASSIUM 100 MG/1
1 TABLET, FILM COATED ORAL
Qty: 0 | Refills: 0 | DISCHARGE

## 2024-04-11 ENCOUNTER — TRANSCRIPTION ENCOUNTER (OUTPATIENT)
Age: 64
End: 2024-04-11

## 2024-04-12 ENCOUNTER — RESULT REVIEW (OUTPATIENT)
Age: 64
End: 2024-04-12

## 2024-04-12 ENCOUNTER — TRANSCRIPTION ENCOUNTER (OUTPATIENT)
Age: 64
End: 2024-04-12

## 2024-04-12 ENCOUNTER — APPOINTMENT (OUTPATIENT)
Dept: SURGERY | Facility: HOSPITAL | Age: 64
End: 2024-04-12
Payer: COMMERCIAL

## 2024-04-12 ENCOUNTER — INPATIENT (INPATIENT)
Facility: HOSPITAL | Age: 64
LOS: 0 days | Discharge: HOME | DRG: 418 | End: 2024-04-13
Attending: STUDENT IN AN ORGANIZED HEALTH CARE EDUCATION/TRAINING PROGRAM | Admitting: SURGERY
Payer: COMMERCIAL

## 2024-04-12 VITALS
WEIGHT: 197.09 LBS | HEART RATE: 63 BPM | HEIGHT: 67 IN | SYSTOLIC BLOOD PRESSURE: 140 MMHG | TEMPERATURE: 98 F | DIASTOLIC BLOOD PRESSURE: 88 MMHG | RESPIRATION RATE: 16 BRPM | OXYGEN SATURATION: 99 %

## 2024-04-12 DIAGNOSIS — K80.8: ICD-10-CM

## 2024-04-12 DIAGNOSIS — Z98.890 OTHER SPECIFIED POSTPROCEDURAL STATES: Chronic | ICD-10-CM

## 2024-04-12 LAB — BLD GP AB SCN SERPL QL: SIGNIFICANT CHANGE UP

## 2024-04-12 PROCEDURE — 88304 TISSUE EXAM BY PATHOLOGIST: CPT | Mod: 26

## 2024-04-12 PROCEDURE — 47562 LAPAROSCOPIC CHOLECYSTECTOMY: CPT | Mod: 80

## 2024-04-12 DEVICE — LIGATING CLIPS WECK HEMOLOK POLYMER LARGE (PURPLE) 6: Type: IMPLANTABLE DEVICE | Status: FUNCTIONAL

## 2024-04-12 RX ORDER — IBRUTINIB 140 MG/1
1 TABLET, FILM COATED ORAL
Refills: 0 | DISCHARGE

## 2024-04-12 RX ORDER — AMLODIPINE BESYLATE 2.5 MG/1
1 TABLET ORAL
Refills: 0 | DISCHARGE

## 2024-04-12 RX ORDER — HYDROMORPHONE HYDROCHLORIDE 2 MG/ML
0.5 INJECTION INTRAMUSCULAR; INTRAVENOUS; SUBCUTANEOUS
Refills: 0 | Status: DISCONTINUED | OUTPATIENT
Start: 2024-04-12 | End: 2024-04-13

## 2024-04-12 RX ORDER — FENTANYL CITRATE 50 UG/ML
25 INJECTION INTRAVENOUS
Refills: 0 | Status: DISCONTINUED | OUTPATIENT
Start: 2024-04-12 | End: 2024-04-13

## 2024-04-12 RX ORDER — INDOCYANINE GREEN 25 MG
2.5 KIT INTRAVASCULAR; INTRAVENOUS ONCE
Refills: 0 | Status: COMPLETED | OUTPATIENT
Start: 2024-04-12 | End: 2024-04-12

## 2024-04-12 RX ORDER — SODIUM CHLORIDE 9 MG/ML
1000 INJECTION, SOLUTION INTRAVENOUS
Refills: 0 | Status: DISCONTINUED | OUTPATIENT
Start: 2024-04-12 | End: 2024-04-13

## 2024-04-12 RX ORDER — FENTANYL CITRATE 50 UG/ML
25 INJECTION INTRAVENOUS
Refills: 0 | Status: DISCONTINUED | OUTPATIENT
Start: 2024-04-12 | End: 2024-04-12

## 2024-04-12 RX ORDER — SODIUM CHLORIDE 9 MG/ML
1000 INJECTION, SOLUTION INTRAVENOUS
Refills: 0 | Status: DISCONTINUED | OUTPATIENT
Start: 2024-04-12 | End: 2024-04-12

## 2024-04-12 RX ORDER — HYDROMORPHONE HYDROCHLORIDE 2 MG/ML
0.5 INJECTION INTRAMUSCULAR; INTRAVENOUS; SUBCUTANEOUS
Refills: 0 | Status: DISCONTINUED | OUTPATIENT
Start: 2024-04-12 | End: 2024-04-12

## 2024-04-12 RX ORDER — ACETAMINOPHEN 500 MG
975 TABLET ORAL ONCE
Refills: 0 | Status: COMPLETED | OUTPATIENT
Start: 2024-04-12 | End: 2024-04-12

## 2024-04-12 RX ORDER — SODIUM CHLORIDE 9 MG/ML
3 INJECTION INTRAMUSCULAR; INTRAVENOUS; SUBCUTANEOUS ONCE
Refills: 0 | Status: DISCONTINUED | OUTPATIENT
Start: 2024-04-12 | End: 2024-04-12

## 2024-04-12 RX ORDER — BUPIVACAINE 13.3 MG/ML
20 INJECTION, SUSPENSION, LIPOSOMAL INFILTRATION ONCE
Refills: 0 | Status: DISCONTINUED | OUTPATIENT
Start: 2024-04-12 | End: 2024-04-12

## 2024-04-12 RX ORDER — KETOROLAC TROMETHAMINE 30 MG/ML
15 SYRINGE (ML) INJECTION ONCE
Refills: 0 | Status: DISCONTINUED | OUTPATIENT
Start: 2024-04-12 | End: 2024-04-12

## 2024-04-12 RX ORDER — CEFAZOLIN SODIUM 1 G
2000 VIAL (EA) INJECTION ONCE
Refills: 0 | Status: DISCONTINUED | OUTPATIENT
Start: 2024-04-12 | End: 2024-04-12

## 2024-04-12 RX ORDER — LOSARTAN POTASSIUM 100 MG/1
1 TABLET, FILM COATED ORAL
Refills: 0 | DISCHARGE

## 2024-04-12 RX ORDER — ACETAMINOPHEN 500 MG
1000 TABLET ORAL ONCE
Refills: 0 | Status: COMPLETED | OUTPATIENT
Start: 2024-04-12 | End: 2024-04-12

## 2024-04-12 RX ADMIN — SODIUM CHLORIDE 75 MILLILITER(S): 9 INJECTION, SOLUTION INTRAVENOUS at 23:02

## 2024-04-12 RX ADMIN — Medication 15 MILLIGRAM(S): at 22:04

## 2024-04-12 RX ADMIN — Medication 15 MILLIGRAM(S): at 22:34

## 2024-04-12 RX ADMIN — Medication 975 MILLIGRAM(S): at 15:43

## 2024-04-12 RX ADMIN — HYDROMORPHONE HYDROCHLORIDE 0.5 MILLIGRAM(S): 2 INJECTION INTRAMUSCULAR; INTRAVENOUS; SUBCUTANEOUS at 22:15

## 2024-04-12 RX ADMIN — HYDROMORPHONE HYDROCHLORIDE 0.5 MILLIGRAM(S): 2 INJECTION INTRAMUSCULAR; INTRAVENOUS; SUBCUTANEOUS at 23:29

## 2024-04-12 RX ADMIN — HYDROMORPHONE HYDROCHLORIDE 0.5 MILLIGRAM(S): 2 INJECTION INTRAMUSCULAR; INTRAVENOUS; SUBCUTANEOUS at 22:45

## 2024-04-12 RX ADMIN — Medication 400 MILLIGRAM(S): at 22:05

## 2024-04-12 RX ADMIN — HYDROMORPHONE HYDROCHLORIDE 0.5 MILLIGRAM(S): 2 INJECTION INTRAMUSCULAR; INTRAVENOUS; SUBCUTANEOUS at 22:59

## 2024-04-12 RX ADMIN — Medication 1000 MILLIGRAM(S): at 22:35

## 2024-04-12 RX ADMIN — INDOCYANINE GREEN 2.5 MILLIGRAM(S): KIT INTRAVASCULAR; INTRAVENOUS at 17:10

## 2024-04-12 NOTE — BRIEF OPERATIVE NOTE - NSICDXBRIEFOPLAUNCH_GEN_ALL_CORE
4 Eyes Skin Assessment     NAME:  Sydney January  YOB: 1941  MEDICAL RECORD NUMBER:  [id number]    The patient is being assessed for  Admission    I agree that at least one RN has performed a thorough Head to Toe Skin Assessment on the patient. ALL assessment sites listed below have been assessed. Areas assessed by both nurses:    Head, Face, Ears, Shoulders, Back, Chest, Arms, Elbows, Hands, Sacrum. Buttock, Coccyx, Ischium, Legs. Feet and Heels, and Under Medical Devices         Does the Patient have a Wound? Yes wound(s) were present on assessment.  LDA wound assessment was Initiated and completed by RN       Hung Prevention initiated by RN: Yes  Wound Care Orders initiated by RN: Yes    Pressure Injury (Stage 3,4, Unstageable, DTI, NWPT, and Complex wounds) if present, place Wound referral order by RN under : Yes    New Ostomies, if present place, Ostomy referral order under : No     Nurse 1 eSignature: Electronically signed by Vickey Encinas RN on 7/28/23 at 9:58 PM EDT    **SHARE this note so that the co-signing nurse can place an eSignature**    Nurse 2 eSignature: Electronically signed by Yoko Balderas on 7/29/23 at 12:25 AM EDT <--- Click to Launch ICDx for PreOp, PostOp and Procedure

## 2024-04-12 NOTE — BRIEF OPERATIVE NOTE - OPERATION/FINDINGS
Critical view obtained, gallbladder perforated during mobilization/ dissection. Cystic artery and cystic duct identified, clipped and cut appropriately. Gallbladder cauterized off liver wall. Suction irrigation used to irrigate bile from perforated gallbladder until clear. No stones noted in abdominal cavity. gallbladder removed from abdominal cavity using endo catch bag. 12mm port closed with 0 Vicryl. rest of ports closed with 4-0 Monocryl. Dermabond used over port site closures.

## 2024-04-12 NOTE — CHART NOTE - NSCHARTNOTEFT_GEN_A_CORE
Patient seen and examined at bedside in the pre-operative area.  Patient appears well, states he has no abdominal pain and minimal tenderness to palpation.  Has been off his chemotherapeutic agent for past 7 days.  There have been no changes since the prior H&P documentation.  Patient is safe to proceed with robotic-assisted laparoscopic cholecystectomy.

## 2024-04-12 NOTE — ASU PREOP CHECKLIST - MUPIRONCIN COMMENTS
pt was not instructed to do surgical washes at home Benzoyl Peroxide Pregnancy And Lactation Text: This medication is Pregnancy Category C. It is unknown if benzoyl peroxide is excreted in breast milk.

## 2024-04-12 NOTE — ASU DISCHARGE PLAN (ADULT/PEDIATRIC) - NS MD DC FALL RISK RISK
For information on Fall & Injury Prevention, visit: [email or web address] OR  [email or web address] OR  https://www.cdc.gov/steadi/patient.html

## 2024-04-12 NOTE — ASU DISCHARGE PLAN (ADULT/PEDIATRIC) - ASU DC SPECIAL INSTRUCTIONSFT
-can take Tylenol 975mg every 6 hours as needed for pain  -continue home medications  -can resume regular diet  -follow-up in surgery office with  in 2 weeks, can call office to make appointment and with questions or concerns  -if development of severe pain, intractable nausea/vomiting, present to ED

## 2024-04-12 NOTE — ASU DISCHARGE PLAN (ADULT/PEDIATRIC) - CARE PROVIDER_API CALL
[facility]le [facility]  Surgery  [address] [place] [id number]  Phone: [phone]  Fax: [phone]  Follow Up Time: 2 weeks

## 2024-04-13 VITALS
RESPIRATION RATE: 15 BRPM | OXYGEN SATURATION: 99 % | SYSTOLIC BLOOD PRESSURE: 124 MMHG | DIASTOLIC BLOOD PRESSURE: 66 MMHG | HEART RATE: 65 BPM

## 2024-04-13 PROCEDURE — S2900: CPT

## 2024-04-13 PROCEDURE — C1889: CPT

## 2024-04-13 PROCEDURE — 88304 TISSUE EXAM BY PATHOLOGIST: CPT

## 2024-04-13 PROCEDURE — 86850 RBC ANTIBODY SCREEN: CPT

## 2024-04-13 PROCEDURE — 36415 COLL VENOUS BLD VENIPUNCTURE: CPT

## 2024-04-13 PROCEDURE — 86900 BLOOD TYPING SEROLOGIC ABO: CPT

## 2024-04-13 PROCEDURE — 86901 BLOOD TYPING SEROLOGIC RH(D): CPT

## 2024-04-13 RX ORDER — SODIUM CHLORIDE 9 MG/ML
500 INJECTION INTRAMUSCULAR; INTRAVENOUS; SUBCUTANEOUS ONCE
Refills: 0 | Status: COMPLETED | OUTPATIENT
Start: 2024-04-13 | End: 2024-04-13

## 2024-04-13 RX ADMIN — SODIUM CHLORIDE 500 MILLILITER(S): 9 INJECTION INTRAMUSCULAR; INTRAVENOUS; SUBCUTANEOUS at 07:35

## 2024-04-13 NOTE — PROGRESS NOTE ADULT - SUBJECTIVE AND OBJECTIVE BOX
Subjective: Patient seen and examined at bedside this morning eating jello. Patient doing well post op, states only having incisional pain. Patient denies HA, CP, SOB, abdominal pain, N/V/D    STATUS POST:  DONN Foster    POST OPERATIVE DAY #:  POD 1    MEDICATIONS  (STANDING):  lactated ringers. 1000 milliLiter(s) (75 mL/Hr) IV Continuous <Continuous>    MEDICATIONS  (PRN):  fentaNYL    Injectable 25 MICROGram(s) IV Push every 5 minutes PRN Moderate Pain (4 - 6)  HYDROmorphone  Injectable 0.5 milliGRAM(s) IV Push every 10 minutes PRN Severe Pain (7 - 10)      Vital Signs Last 24 Hrs  T(C): 36.6 (13 Apr 2024 00:00), Max: 36.8 (12 Apr 2024 21:45)  T(F): 97.9 (13 Apr 2024 00:00), Max: 98.3 (12 Apr 2024 21:45)  HR: 79 (13 Apr 2024 00:00) (61 - 79)  BP: 115/79 (13 Apr 2024 00:00) (103/83 - 140/88)  BP(mean): 89 (13 Apr 2024 00:00) (78 - 98)  RR: 17 (13 Apr 2024 00:00) (14 - 20)  SpO2: 96% (13 Apr 2024 00:00) (95% - 99%)    Parameters below as of 13 Apr 2024 00:00  Patient On (Oxygen Delivery Method): room air      Physical Exam:    Constitutional: NAD  HEENT: PERRL, EOMI  Neck: No JVD, FROM without pain  Respiratory: Respirations non-labored, no accessory muscle use  Gastrointestinal: Soft, ND, ttp as expected, incisions C/D/I.   Extremities: No peripheral edema, No cyanosis  Neurological: A&O x 3; without gross deficit  Musculoskeletal: No joint pain, swelling, deformity, or point tenderness; no limitation of movement      LABS:          A: 68 yo male POD 1 s/p DONN foster     Plan:   -d/c home from pacu this am

## 2024-04-16 LAB — SURGICAL PATHOLOGY STUDY: SIGNIFICANT CHANGE UP

## 2024-04-25 ENCOUNTER — APPOINTMENT (OUTPATIENT)
Dept: SURGERY | Facility: CLINIC | Age: 64
End: 2024-04-25
Payer: COMMERCIAL

## 2024-04-25 VITALS
HEART RATE: 56 BPM | SYSTOLIC BLOOD PRESSURE: 134 MMHG | WEIGHT: 194 LBS | HEIGHT: 68 IN | OXYGEN SATURATION: 98 % | TEMPERATURE: 98.5 F | RESPIRATION RATE: 16 BRPM | BODY MASS INDEX: 29.4 KG/M2 | DIASTOLIC BLOOD PRESSURE: 80 MMHG

## 2024-04-25 PROCEDURE — 99024 POSTOP FOLLOW-UP VISIT: CPT

## 2024-04-25 NOTE — ASSESSMENT
[FreeTextEntry1] : The patient is stable and doing well following a robotic cholecystectomy. He will follow up in 2 weeks for a recheck or sooner should any problems arise.

## 2024-04-25 NOTE — PHYSICAL EXAM
[JVD] : no jugular venous distention  [Purpura] : no purpura  [Petechiae] : no petechiae [Skin Ulcer] : no ulcer [Skin Induration] : no induration [Alert] : alert [Oriented to Person] : oriented to person [Oriented to Place] : oriented to place [Oriented to Time] : oriented to time [Calm] : calm [de-identified] : nontoxic, in no acute distress, [de-identified] : NC/AT PERRL EOMI no scleral icterus  [de-identified] : trachea midline  [de-identified] : no audible wheezing or stridor  [de-identified] : soft, nontender, no guarding, no rebound, no masses  [de-identified] : FROM of all extremities with no gross angulation or deformity, there is no abdominal wall herniation  [de-identified] : surgical incisions are without infection or drainage  [de-identified] : mood is calm

## 2024-04-25 NOTE — HISTORY OF PRESENT ILLNESS
[de-identified] : The patient is stable and doing well. He has no abdominal pain, nausea, or vomit. He reports that the diarrhea he had prior to surgery has improved.

## 2024-05-09 ENCOUNTER — NON-APPOINTMENT (OUTPATIENT)
Age: 64
End: 2024-05-09

## 2024-05-09 ENCOUNTER — APPOINTMENT (OUTPATIENT)
Dept: SURGERY | Facility: CLINIC | Age: 64
End: 2024-05-09
Payer: COMMERCIAL

## 2024-05-09 VITALS
OXYGEN SATURATION: 97 % | HEART RATE: 57 BPM | HEIGHT: 68 IN | TEMPERATURE: 97.8 F | SYSTOLIC BLOOD PRESSURE: 115 MMHG | BODY MASS INDEX: 28.95 KG/M2 | RESPIRATION RATE: 16 BRPM | WEIGHT: 191 LBS | DIASTOLIC BLOOD PRESSURE: 70 MMHG

## 2024-05-09 DIAGNOSIS — K81.0 ACUTE CHOLECYSTITIS: ICD-10-CM

## 2024-05-09 DIAGNOSIS — K80.20 CALCULUS OF GALLBLADDER W/OUT CHOLECYSTITIS W/OUT OBSTRUCTION: ICD-10-CM

## 2024-05-09 PROCEDURE — 99024 POSTOP FOLLOW-UP VISIT: CPT

## 2024-05-09 NOTE — PHYSICAL EXAM
[JVD] : no jugular venous distention  [Purpura] : no purpura  [Petechiae] : no petechiae [Skin Ulcer] : no ulcer [Skin Induration] : no induration [Alert] : alert [Oriented to Person] : oriented to person [Oriented to Place] : oriented to place [Oriented to Time] : oriented to time [Calm] : calm [de-identified] : nontoxic, in no acute distress, [de-identified] : NC/AT PERRL EOMI no scleral icterus  [de-identified] : trachea midline  [de-identified] : no audible wheezing or stridor  [de-identified] : soft, nontender, no guarding, no rebound, no masses  [de-identified] : FROM of all extremities with no gross angulation or deformity, there is no abdominal wall herniation  [de-identified] : surgical incisions are without infection or drainage, there is mild residual swelling of the supraumbilical incision site without redness or tenderness   [de-identified] : mood is calm

## 2024-05-09 NOTE — ASSESSMENT
[FreeTextEntry1] : The patient is stable and overall doing well following a robotic cholecystectomy.  The patient is stable to resume all activities. He will follow up as needed from this point forward.

## 2024-05-13 ENCOUNTER — NON-APPOINTMENT (OUTPATIENT)
Age: 64
End: 2024-05-13

## 2024-05-13 ENCOUNTER — APPOINTMENT (OUTPATIENT)
Dept: FAMILY MEDICINE | Facility: CLINIC | Age: 64
End: 2024-05-13
Payer: COMMERCIAL

## 2024-05-13 VITALS
WEIGHT: 183 LBS | BODY MASS INDEX: 27.74 KG/M2 | TEMPERATURE: 98.6 F | OXYGEN SATURATION: 98 % | SYSTOLIC BLOOD PRESSURE: 122 MMHG | HEART RATE: 71 BPM | HEIGHT: 68 IN | DIASTOLIC BLOOD PRESSURE: 78 MMHG | RESPIRATION RATE: 14 BRPM

## 2024-05-13 DIAGNOSIS — Z82.49 FAMILY HISTORY OF ISCHEMIC HEART DISEASE AND OTHER DISEASES OF THE CIRCULATORY SYSTEM: ICD-10-CM

## 2024-05-13 DIAGNOSIS — Z87.09 PERSONAL HISTORY OF OTHER DISEASES OF THE RESPIRATORY SYSTEM: ICD-10-CM

## 2024-05-13 DIAGNOSIS — K46.9 UNSPECIFIED ABDOMINAL HERNIA W/OUT OBSTRUCTION OR GANGRENE: ICD-10-CM

## 2024-05-13 DIAGNOSIS — Z00.00 ENCOUNTER FOR GENERAL ADULT MEDICAL EXAMINATION W/OUT ABNORMAL FINDINGS: ICD-10-CM

## 2024-05-13 DIAGNOSIS — Z86.19 PERSONAL HISTORY OF OTHER INFECTIOUS AND PARASITIC DISEASES: ICD-10-CM

## 2024-05-13 DIAGNOSIS — Z11.1 ENCOUNTER FOR SCREENING FOR RESPIRATORY TUBERCULOSIS: ICD-10-CM

## 2024-05-13 DIAGNOSIS — Z80.6 FAMILY HISTORY OF LEUKEMIA: ICD-10-CM

## 2024-05-13 DIAGNOSIS — Z78.9 OTHER SPECIFIED HEALTH STATUS: ICD-10-CM

## 2024-05-13 DIAGNOSIS — Z87.891 PERSONAL HISTORY OF NICOTINE DEPENDENCE: ICD-10-CM

## 2024-05-13 DIAGNOSIS — Z80.3 FAMILY HISTORY OF MALIGNANT NEOPLASM OF BREAST: ICD-10-CM

## 2024-05-13 PROCEDURE — 00009S: CUSTOM

## 2024-05-13 PROCEDURE — 00012S: CUSTOM

## 2024-05-13 PROCEDURE — 00035S: CUSTOM

## 2024-05-13 PROCEDURE — 00002S: CUSTOM

## 2024-05-13 PROCEDURE — 00011S: CUSTOM

## 2024-05-13 NOTE — HEALTH RISK ASSESSMENT
[Good] : ~his/her~ current health as good [Very Good] : ~his/her~  mood as very good [Yes] : Yes [Monthly or less (1 pt)] : Monthly or less (1 point) [1 or 2 (0 pts)] : 1 or 2 (0 points) [Never (0 pts)] : Never (0 points) [No] : In the past 12 months have you used drugs other than those required for medical reasons? No [No falls in past year] : Patient reported no falls in the past year [0] : 2) Feeling down, depressed, or hopeless: Not at all (0) [PHQ-2 Negative - No further assessment needed] : PHQ-2 Negative - No further assessment needed [Patient declined Low Dose CT Scan] : Patient declined Low Dose CT Scan [No Retinopathy] : No retinopathy [Patient declined mammogram] : Patient declined mammogram [Patient declined PAP Smear] : Patient declined PAP Smear [Patient declined bone density test] : Patient declined bone density test [Patient declined colonoscopy] : Patient declined colonoscopy [HIV test declined] : HIV test declined [Hepatitis C test offered] : Hepatitis C test offered [None] : None [With Significant Other] : lives with significant other [# of Members in Household ___] :  household currently consist of [unfilled] member(s) [Employed] : employed [Graduate School] : graduate school [] :  [# Of Children ___] : has [unfilled] children [Sexually Active] : sexually active [Fully functional (bathing, dressing, toileting, transferring, walking, feeding)] : Fully functional (bathing, dressing, toileting, transferring, walking, feeding) [Feels Safe at Home] : Feels safe at home [Fully functional (using the telephone, shopping, preparing meals, housekeeping, doing laundry, using] : Fully functional and needs no help or supervision to perform IADLs (using the telephone, shopping, preparing meals, housekeeping, doing laundry, using transportation, managing medications and managing finances) [Reports normal functional visual acuity (ie: able to read med bottle)] : Reports normal functional visual acuity [Smoke Detector] : smoke detector [Carbon Monoxide Detector] : carbon monoxide detector [Safety elements used in home] : safety elements used in home [Seat Belt] :  uses seat belt [Sunscreen] : uses sunscreen [Patient/Caregiver not ready to engage] : , patient/caregiver not ready to engage [I will adhere to the patient's wishes.] : I will adhere to the patient's wishes. [Time Spent: ___ minutes] : Time Spent: [unfilled] minutes [Former] : Former [Audit-CScore] : 1 [de-identified] : Average [de-identified] : Average [facility]] : 7 [[id number]] : 0 [LowDoseCTScan] : 05/24 [EyeExamDate] : 05/24 [Change in mental status noted] : No change in mental status noted [Language] : denies difficulty with language [Behavior] : denies difficulty with behavior [Learning/Retaining New Information] : denies difficulty learning/retaining new information [Handling Complex Tasks] : denies difficulty handling complex tasks [Reasoning] : denies difficulty with reasoning [Spatial Ability and Orientation] : denies difficulty with spatial ability and orientation [High Risk Behavior] : no high risk behavior [Reports changes in hearing] : Reports no changes in hearing [Reports changes in vision] : Reports no changes in vision [Reports changes in dental health] : Reports no changes in dental health [Guns at Home] : no guns at home [Travel to Developing Areas] : does not  travel to developing areas [TB Exposure] : is not being exposed to tuberculosis [Caregiver Concerns] : does not have caregiver concerns [MammogramDate] : 05/24 [PapSmearDate] : 05/24 [BoneDensityDate] : 05/24 [ColonoscopyDate] : 05/24 [HIVDate] : 05/24 [HepatitisCDate] : 03/17 [HepatitisCComments] : Negative [FreeTextEntry2] :  [AdvancecareDate] : 05/24

## 2024-05-13 NOTE — REVIEW OF SYSTEMS
[Patient Intake Form Reviewed] : Patient intake form was reviewed [Fatigue] : fatigue [Negative] : Psychiatric [FreeTextEntry2] : Overweight [FreeTextEntry5] : HLD, HTN [FreeTextEntry7] : GERD [FreeTextEntry8] : ED [de-identified] : CLL

## 2024-05-13 NOTE — ASSESSMENT
[FreeTextEntry1] : [employer] Annual PE - WNL, Qualified Class A for [employer] Fire[employer].

## 2024-08-07 ENCOUNTER — OUTPATIENT (OUTPATIENT)
Dept: OUTPATIENT SERVICES | Facility: HOSPITAL | Age: 64
LOS: 1 days | Discharge: HOME | End: 2024-08-07

## 2024-08-07 DIAGNOSIS — D64.9 ANEMIA, UNSPECIFIED: ICD-10-CM

## 2024-08-07 DIAGNOSIS — Z98.890 OTHER SPECIFIED POSTPROCEDURAL STATES: Chronic | ICD-10-CM

## 2024-08-13 ENCOUNTER — RESULT REVIEW (OUTPATIENT)
Age: 64
End: 2024-08-13

## 2024-08-13 ENCOUNTER — APPOINTMENT (OUTPATIENT)
Dept: HEMATOLOGY ONCOLOGY | Facility: CLINIC | Age: 64
End: 2024-08-13
Payer: COMMERCIAL

## 2024-08-13 ENCOUNTER — APPOINTMENT (OUTPATIENT)
Dept: HEMATOLOGY ONCOLOGY | Facility: CLINIC | Age: 64
End: 2024-08-13

## 2024-08-13 VITALS
SYSTOLIC BLOOD PRESSURE: 115 MMHG | WEIGHT: 203.91 LBS | BODY MASS INDEX: 31.01 KG/M2 | DIASTOLIC BLOOD PRESSURE: 76 MMHG | OXYGEN SATURATION: 98 % | TEMPERATURE: 98 F | RESPIRATION RATE: 16 BRPM | HEART RATE: 61 BPM

## 2024-08-13 DIAGNOSIS — I10 ESSENTIAL (PRIMARY) HYPERTENSION: ICD-10-CM

## 2024-08-13 DIAGNOSIS — R97.20 ELEVATED PROSTATE, SPECIFIC ANTIGEN [PSA]: ICD-10-CM

## 2024-08-13 DIAGNOSIS — Z51.11 ENCOUNTER FOR ANTINEOPLASTIC CHEMOTHERAPY: ICD-10-CM

## 2024-08-13 DIAGNOSIS — D69.6 THROMBOCYTOPENIA, UNSPECIFIED: ICD-10-CM

## 2024-08-13 DIAGNOSIS — C91.10 CHRONIC LYMPHOCYTIC LEUKEMIA OF B-CELL TYPE NOT HAVING ACHIEVED REMISSION: ICD-10-CM

## 2024-08-13 DIAGNOSIS — Z87.19 PERSONAL HISTORY OF OTHER DISEASES OF THE DIGESTIVE SYSTEM: ICD-10-CM

## 2024-08-13 LAB
BASOPHILS # BLD AUTO: 0.08 K/UL — SIGNIFICANT CHANGE UP (ref 0–0.2)
BASOPHILS NFR BLD AUTO: 1.1 % — SIGNIFICANT CHANGE UP (ref 0–2)
EOSINOPHIL # BLD AUTO: 0.19 K/UL — SIGNIFICANT CHANGE UP (ref 0–0.5)
EOSINOPHIL NFR BLD AUTO: 2.6 % — SIGNIFICANT CHANGE UP (ref 0–6)
HCT VFR BLD CALC: 45.5 % — SIGNIFICANT CHANGE UP (ref 39–50)
HGB BLD-MCNC: 15.1 G/DL — SIGNIFICANT CHANGE UP (ref 13–17)
IMM GRANULOCYTES NFR BLD AUTO: 0.7 % — SIGNIFICANT CHANGE UP (ref 0–0.9)
LYMPHOCYTES # BLD AUTO: 2.01 K/UL — SIGNIFICANT CHANGE UP (ref 1–3.3)
LYMPHOCYTES # BLD AUTO: 27.6 % — SIGNIFICANT CHANGE UP (ref 13–44)
MCHC RBC-ENTMCNC: 28.3 PG — SIGNIFICANT CHANGE UP (ref 27–34)
MCHC RBC-ENTMCNC: 33.2 G/DL — SIGNIFICANT CHANGE UP (ref 32–36)
MCV RBC AUTO: 85.4 FL — SIGNIFICANT CHANGE UP (ref 80–100)
MONOCYTES # BLD AUTO: 0.64 K/UL — SIGNIFICANT CHANGE UP (ref 0–0.9)
MONOCYTES NFR BLD AUTO: 8.8 % — SIGNIFICANT CHANGE UP (ref 2–14)
NEUTROPHILS # BLD AUTO: 4.31 K/UL — SIGNIFICANT CHANGE UP (ref 1.8–7.4)
NEUTROPHILS NFR BLD AUTO: 59.2 % — SIGNIFICANT CHANGE UP (ref 43–77)
NRBC # BLD: 0 /100 WBCS — SIGNIFICANT CHANGE UP (ref 0–0)
PLATELET # BLD AUTO: 140 K/UL — LOW (ref 150–400)
RBC # BLD: 5.33 M/UL — SIGNIFICANT CHANGE UP (ref 4.2–5.8)
RBC # FLD: 14.1 % — SIGNIFICANT CHANGE UP (ref 10.3–14.5)
WBC # BLD: 7.28 K/UL — SIGNIFICANT CHANGE UP (ref 3.8–10.5)
WBC # FLD AUTO: 7.28 K/UL — SIGNIFICANT CHANGE UP (ref 3.8–10.5)

## 2024-08-13 PROCEDURE — G2211 COMPLEX E/M VISIT ADD ON: CPT

## 2024-08-13 PROCEDURE — 99214 OFFICE O/P EST MOD 30 MIN: CPT

## 2024-08-13 NOTE — REVIEW OF SYSTEMS
[Diarrhea: Grade 1 - Increase of <4 stools per day over baseline; mild increase in ostomy output compared to baseline] : Diarrhea: Grade 1 - Increase of <4 stools per day over baseline; mild increase in ostomy output compared to baseline [Negative] : Allergic/Immunologic [Fever] : no fever [Chills] : no chills

## 2024-08-13 NOTE — HISTORY OF PRESENT ILLNESS
[de-identified] : CLL [de-identified] : Mr. Don is a 63 year old male with CLL (diagnosed 2014) on [id number] study who presents for follow-up.  CLL is CD38+, ZAP-70+, unmut IGVH, TP53 mut. On MRD cohort of study, ibrutinib vs placebo. Unblinded 2/2023; was on ibrutinib, now on commercial drug.  He feels well and has no new complaints.   He received 2 doses of the COVID vaccine (Moderna)--> he tested positive for antibodies. He received a Moderna booster vaccine.   [de-identified] : CD38+, ZAP70+\par  unmutated IGHV\par  TP53 mutation. \par   [de-identified] : CLL, remains on ibrutinib 420 mg/d cont to tolerate well no constitutional c/o no resp infections, cardiac c/o last visit c/o abd pain, had incr  LFTs, had acute cholecystitis imbruvica held 7d, took abs, then had robotic cholecystectomy uneventfully Ibrutinib restarted 7d later pre op abd CT w/o LAD or H/S megaly (4/4/2024) Cholelithiasis with pericholecystic fat stranding. Findings concerning for acute cholecystitis especially in setting of transaminitis. Abdominal ultrasound suggested for further evaluation. Mild acute diverticulitis involving the descending colon. No adjacent collection.  no c/o to suggest diverticular dz HTN remains well controlled

## 2024-08-13 NOTE — END OF VISIT
[FreeTextEntry3] : Patient seen and case discussed with FREDI Kumar. I agree with above and have edited the note where needed.\par   [Time Spent: ___ minutes] : I have spent [unfilled] minutes of time on the encounter.

## 2024-08-13 NOTE — ASSESSMENT
[FreeTextEntry1] : Mr. Don is a 64 year old male with CLL (diagnosed 2014) on [id number] study (MRD Cohort)- in [place]. Trial was unblinded and was on the ibrutinib arm no significant therapy-related toxicity to date Remains w/o evidence of dz recurrence, on daily ibrutinib. no constitutional c/o CBC results reviewed and d/w pt WBC 7.28, Hgb 15.1, Plt 140K, ANC 4.31, ALC 2.01  mild thrombocytopenia, has had plt count in this range before  s/p acute cholecystitis, robotic cholecystectomy uneventful recovery    HTN well controlled on losartan and incr dose of amlodipine  continue ibrutinib 420 mg daily  He again says that he will have a colonoscopy - he's never had one to date- Most recent PSA incr 4.43, free PSA nl, says he has a urologist   check CMP, LDH, uric acid, Igs  RV in 4 mos.

## 2024-08-13 NOTE — PHYSICAL EXAM
[Fully active, able to carry on all pre-disease performance without restriction] : Status 0 - Fully active, able to carry on all pre-disease performance without restriction [Normal] : affect appropriate [de-identified] : small ventral hernia

## 2024-09-12 NOTE — ED ADULT NURSE NOTE - IN THE PAST 12 MONTHS HAVE YOU USED DRUGS OTHER THAN THOSE REQUIRED FOR MEDICAL REASON?
no abdominal distension/no blood in stool/no burning urination/no chills/no diarrhea/no dysuria/no fever/no hematuria No

## 2024-09-13 RX ORDER — NIRMATRELVIR AND RITONAVIR 300-100 MG
20 X 150 MG & KIT ORAL
Qty: 1 | Refills: 0 | Status: ACTIVE | COMMUNITY
Start: 2024-09-13 | End: 1900-01-01

## 2024-10-11 ENCOUNTER — APPOINTMENT (OUTPATIENT)
Dept: INTERNAL MEDICINE | Facility: CLINIC | Age: 64
End: 2024-10-11
Payer: COMMERCIAL

## 2024-10-11 VITALS
HEART RATE: 67 BPM | SYSTOLIC BLOOD PRESSURE: 146 MMHG | WEIGHT: 201 LBS | DIASTOLIC BLOOD PRESSURE: 90 MMHG | HEIGHT: 68 IN | OXYGEN SATURATION: 95 % | BODY MASS INDEX: 30.46 KG/M2

## 2024-10-11 DIAGNOSIS — J01.90 ACUTE SINUSITIS, UNSPECIFIED: ICD-10-CM

## 2024-10-11 PROCEDURE — 99214 OFFICE O/P EST MOD 30 MIN: CPT

## 2024-10-11 RX ORDER — AZITHROMYCIN 250 MG/1
250 TABLET, FILM COATED ORAL
Qty: 1 | Refills: 0 | Status: ACTIVE | COMMUNITY
Start: 2024-10-11 | End: 1900-01-01

## 2024-12-10 ENCOUNTER — OUTPATIENT (OUTPATIENT)
Dept: OUTPATIENT SERVICES | Facility: HOSPITAL | Age: 64
LOS: 1 days | Discharge: HOME | End: 2024-12-10

## 2024-12-10 DIAGNOSIS — D64.9 ANEMIA, UNSPECIFIED: ICD-10-CM

## 2024-12-10 DIAGNOSIS — Z98.890 OTHER SPECIFIED POSTPROCEDURAL STATES: Chronic | ICD-10-CM

## 2024-12-26 ENCOUNTER — RESULT REVIEW (OUTPATIENT)
Age: 64
End: 2024-12-26

## 2024-12-26 ENCOUNTER — APPOINTMENT (OUTPATIENT)
Dept: HEMATOLOGY ONCOLOGY | Facility: CLINIC | Age: 64
End: 2024-12-26

## 2024-12-26 ENCOUNTER — APPOINTMENT (OUTPATIENT)
Dept: HEMATOLOGY ONCOLOGY | Facility: CLINIC | Age: 64
End: 2024-12-26
Payer: COMMERCIAL

## 2024-12-26 VITALS
HEART RATE: 68 BPM | WEIGHT: 207.65 LBS | BODY MASS INDEX: 31.47 KG/M2 | SYSTOLIC BLOOD PRESSURE: 143 MMHG | DIASTOLIC BLOOD PRESSURE: 86 MMHG | RESPIRATION RATE: 16 BRPM | TEMPERATURE: 97.4 F | OXYGEN SATURATION: 99 % | HEIGHT: 68 IN

## 2024-12-26 DIAGNOSIS — R97.20 ELEVATED PROSTATE, SPECIFIC ANTIGEN [PSA]: ICD-10-CM

## 2024-12-26 DIAGNOSIS — C91.10 CHRONIC LYMPHOCYTIC LEUKEMIA OF B-CELL TYPE NOT HAVING ACHIEVED REMISSION: ICD-10-CM

## 2024-12-26 DIAGNOSIS — Z51.11 ENCOUNTER FOR ANTINEOPLASTIC CHEMOTHERAPY: ICD-10-CM

## 2024-12-26 DIAGNOSIS — Z87.19 PERSONAL HISTORY OF OTHER DISEASES OF THE DIGESTIVE SYSTEM: ICD-10-CM

## 2024-12-26 LAB
BASOPHILS # BLD AUTO: 0.09 K/UL — SIGNIFICANT CHANGE UP (ref 0–0.2)
BASOPHILS NFR BLD AUTO: 1 % — SIGNIFICANT CHANGE UP (ref 0–2)
EOSINOPHIL # BLD AUTO: 0.13 K/UL — SIGNIFICANT CHANGE UP (ref 0–0.5)
EOSINOPHIL NFR BLD AUTO: 1.4 % — SIGNIFICANT CHANGE UP (ref 0–6)
HCT VFR BLD CALC: 45.4 % — SIGNIFICANT CHANGE UP (ref 39–50)
HGB BLD-MCNC: 15.4 G/DL — SIGNIFICANT CHANGE UP (ref 13–17)
IMM GRANULOCYTES NFR BLD AUTO: 0.4 % — SIGNIFICANT CHANGE UP (ref 0–0.9)
LYMPHOCYTES # BLD AUTO: 2.12 K/UL — SIGNIFICANT CHANGE UP (ref 1–3.3)
LYMPHOCYTES # BLD AUTO: 23.3 % — SIGNIFICANT CHANGE UP (ref 13–44)
MCHC RBC-ENTMCNC: 28.7 PG — SIGNIFICANT CHANGE UP (ref 27–34)
MCHC RBC-ENTMCNC: 33.9 G/DL — SIGNIFICANT CHANGE UP (ref 32–36)
MCV RBC AUTO: 84.7 FL — SIGNIFICANT CHANGE UP (ref 80–100)
MONOCYTES # BLD AUTO: 0.98 K/UL — HIGH (ref 0–0.9)
MONOCYTES NFR BLD AUTO: 10.8 % — SIGNIFICANT CHANGE UP (ref 2–14)
NEUTROPHILS # BLD AUTO: 5.74 K/UL — SIGNIFICANT CHANGE UP (ref 1.8–7.4)
NEUTROPHILS NFR BLD AUTO: 63.1 % — SIGNIFICANT CHANGE UP (ref 43–77)
NRBC # BLD: 0 /100 WBCS — SIGNIFICANT CHANGE UP (ref 0–0)
NRBC BLD-RTO: 0 /100 WBCS — SIGNIFICANT CHANGE UP (ref 0–0)
PLATELET # BLD AUTO: 168 K/UL — SIGNIFICANT CHANGE UP (ref 150–400)
RBC # BLD: 5.36 M/UL — SIGNIFICANT CHANGE UP (ref 4.2–5.8)
RBC # FLD: 13.5 % — SIGNIFICANT CHANGE UP (ref 10.3–14.5)
WBC # BLD: 9.1 K/UL — SIGNIFICANT CHANGE UP (ref 3.8–10.5)
WBC # FLD AUTO: 9.1 K/UL — SIGNIFICANT CHANGE UP (ref 3.8–10.5)

## 2024-12-26 PROCEDURE — 99213 OFFICE O/P EST LOW 20 MIN: CPT

## 2024-12-26 PROCEDURE — G2211 COMPLEX E/M VISIT ADD ON: CPT | Mod: NC

## 2024-12-28 LAB
ALBUMIN SERPL ELPH-MCNC: 4.4 G/DL
ALP BLD-CCNC: 88 U/L
ALT SERPL-CCNC: 24 U/L
ANION GAP SERPL CALC-SCNC: 12 MMOL/L
AST SERPL-CCNC: 29 U/L
BILIRUB SERPL-MCNC: 0.6 MG/DL
BUN SERPL-MCNC: 16 MG/DL
CALCIUM SERPL-MCNC: 9.5 MG/DL
CHLORIDE SERPL-SCNC: 101 MMOL/L
CO2 SERPL-SCNC: 24 MMOL/L
CREAT SERPL-MCNC: 1.09 MG/DL
DEPRECATED KAPPA LC FREE/LAMBDA SER: 1.96 RATIO
EGFR: 76 ML/MIN/1.73M2
GLUCOSE SERPL-MCNC: 82 MG/DL
IGA SER QL IEP: 261 MG/DL
IGG SER QL IEP: 1205 MG/DL
IGM SER QL IEP: 34 MG/DL
KAPPA LC CSF-MCNC: 1.34 MG/DL
KAPPA LC SERPL-MCNC: 2.63 MG/DL
LDH SERPL-CCNC: 212 U/L
POTASSIUM SERPL-SCNC: 4.3 MMOL/L
PROT SERPL-MCNC: 7.3 G/DL
SODIUM SERPL-SCNC: 137 MMOL/L
URATE SERPL-MCNC: 6.2 MG/DL

## 2025-03-03 ENCOUNTER — RX RENEWAL (OUTPATIENT)
Age: 65
End: 2025-03-03

## 2025-03-28 ENCOUNTER — APPOINTMENT (OUTPATIENT)
Dept: INTERNAL MEDICINE | Facility: CLINIC | Age: 65
End: 2025-03-28

## 2025-03-28 VITALS
WEIGHT: 202 LBS | HEART RATE: 61 BPM | DIASTOLIC BLOOD PRESSURE: 92 MMHG | SYSTOLIC BLOOD PRESSURE: 148 MMHG | OXYGEN SATURATION: 95 % | TEMPERATURE: 97.9 F | BODY MASS INDEX: 30.62 KG/M2 | HEIGHT: 68 IN

## 2025-03-28 DIAGNOSIS — R05.9 COUGH, UNSPECIFIED: ICD-10-CM

## 2025-03-28 DIAGNOSIS — Z87.898 PERSONAL HISTORY OF OTHER SPECIFIED CONDITIONS: ICD-10-CM

## 2025-03-28 PROCEDURE — 99214 OFFICE O/P EST MOD 30 MIN: CPT

## 2025-03-28 RX ORDER — ALBUTEROL SULFATE 90 UG/1
108 (90 BASE) INHALANT RESPIRATORY (INHALATION)
Qty: 1 | Refills: 0 | Status: ACTIVE | COMMUNITY
Start: 2025-03-28 | End: 1900-01-01

## 2025-03-28 RX ORDER — AZITHROMYCIN 250 MG/1
250 TABLET, FILM COATED ORAL
Qty: 1 | Refills: 0 | Status: ACTIVE | COMMUNITY
Start: 2025-03-28 | End: 1900-01-01

## 2025-04-10 ENCOUNTER — RX RENEWAL (OUTPATIENT)
Age: 65
End: 2025-04-10

## 2025-04-18 ENCOUNTER — OUTPATIENT (OUTPATIENT)
Dept: OUTPATIENT SERVICES | Facility: HOSPITAL | Age: 65
LOS: 1 days | Discharge: HOME | End: 2025-04-18

## 2025-04-18 DIAGNOSIS — Z98.890 OTHER SPECIFIED POSTPROCEDURAL STATES: Chronic | ICD-10-CM

## 2025-04-18 DIAGNOSIS — D64.9 ANEMIA, UNSPECIFIED: ICD-10-CM

## 2025-04-21 ENCOUNTER — RESULT REVIEW (OUTPATIENT)
Age: 65
End: 2025-04-21

## 2025-04-21 ENCOUNTER — APPOINTMENT (OUTPATIENT)
Dept: HEMATOLOGY ONCOLOGY | Facility: CLINIC | Age: 65
End: 2025-04-21
Payer: MEDICARE

## 2025-04-21 VITALS
OXYGEN SATURATION: 98 % | BODY MASS INDEX: 30.71 KG/M2 | WEIGHT: 201.94 LBS | DIASTOLIC BLOOD PRESSURE: 86 MMHG | SYSTOLIC BLOOD PRESSURE: 137 MMHG | TEMPERATURE: 97.3 F | RESPIRATION RATE: 16 BRPM | HEART RATE: 68 BPM

## 2025-04-21 DIAGNOSIS — R05.9 COUGH, UNSPECIFIED: ICD-10-CM

## 2025-04-21 DIAGNOSIS — C91.10 CHRONIC LYMPHOCYTIC LEUKEMIA OF B-CELL TYPE NOT HAVING ACHIEVED REMISSION: ICD-10-CM

## 2025-04-21 DIAGNOSIS — Z51.11 ENCOUNTER FOR ANTINEOPLASTIC CHEMOTHERAPY: ICD-10-CM

## 2025-04-21 LAB
BASOPHILS # BLD AUTO: 0.11 K/UL — SIGNIFICANT CHANGE UP (ref 0–0.2)
BASOPHILS NFR BLD AUTO: 1.3 % — SIGNIFICANT CHANGE UP (ref 0–2)
EOSINOPHIL # BLD AUTO: 0.23 K/UL — SIGNIFICANT CHANGE UP (ref 0–0.5)
EOSINOPHIL NFR BLD AUTO: 2.8 % — SIGNIFICANT CHANGE UP (ref 0–6)
HCT VFR BLD CALC: 43.9 % — SIGNIFICANT CHANGE UP (ref 39–50)
HGB BLD-MCNC: 15 G/DL — SIGNIFICANT CHANGE UP (ref 13–17)
IMM GRANULOCYTES NFR BLD AUTO: 0.4 % — SIGNIFICANT CHANGE UP (ref 0–0.9)
LYMPHOCYTES # BLD AUTO: 1.81 K/UL — SIGNIFICANT CHANGE UP (ref 1–3.3)
LYMPHOCYTES # BLD AUTO: 21.9 % — SIGNIFICANT CHANGE UP (ref 13–44)
MCHC RBC-ENTMCNC: 29 PG — SIGNIFICANT CHANGE UP (ref 27–34)
MCHC RBC-ENTMCNC: 34.2 G/DL — SIGNIFICANT CHANGE UP (ref 32–36)
MCV RBC AUTO: 84.9 FL — SIGNIFICANT CHANGE UP (ref 80–100)
MONOCYTES # BLD AUTO: 0.76 K/UL — SIGNIFICANT CHANGE UP (ref 0–0.9)
MONOCYTES NFR BLD AUTO: 9.2 % — SIGNIFICANT CHANGE UP (ref 2–14)
NEUTROPHILS # BLD AUTO: 5.34 K/UL — SIGNIFICANT CHANGE UP (ref 1.8–7.4)
NEUTROPHILS NFR BLD AUTO: 64.4 % — SIGNIFICANT CHANGE UP (ref 43–77)
NRBC BLD AUTO-RTO: 0 /100 WBCS — SIGNIFICANT CHANGE UP (ref 0–0)
PLATELET # BLD AUTO: 135 K/UL — LOW (ref 150–400)
RBC # BLD: 5.17 M/UL — SIGNIFICANT CHANGE UP (ref 4.2–5.8)
RBC # FLD: 13.6 % — SIGNIFICANT CHANGE UP (ref 10.3–14.5)
WBC # BLD: 8.28 K/UL — SIGNIFICANT CHANGE UP (ref 3.8–10.5)
WBC # FLD AUTO: 8.28 K/UL — SIGNIFICANT CHANGE UP (ref 3.8–10.5)

## 2025-04-21 PROCEDURE — 99204 OFFICE O/P NEW MOD 45 MIN: CPT

## 2025-04-22 LAB
ALBUMIN SERPL ELPH-MCNC: 4.2 G/DL
ALP BLD-CCNC: 92 U/L
ALT SERPL-CCNC: 18 U/L
ANION GAP SERPL CALC-SCNC: 12 MMOL/L
AST SERPL-CCNC: 21 U/L
BILIRUB SERPL-MCNC: 0.3 MG/DL
BUN SERPL-MCNC: 15 MG/DL
CALCIUM SERPL-MCNC: 9.6 MG/DL
CHLORIDE SERPL-SCNC: 102 MMOL/L
CO2 SERPL-SCNC: 22 MMOL/L
CREAT SERPL-MCNC: 1.08 MG/DL
DEPRECATED KAPPA LC FREE/LAMBDA SER: 1.56 RATIO
EGFRCR SERPLBLD CKD-EPI 2021: 76 ML/MIN/1.73M2
GLUCOSE SERPL-MCNC: 128 MG/DL
IGA SER QL IEP: 234 MG/DL
IGG SER QL IEP: 1074 MG/DL
IGM SER QL IEP: 33 MG/DL
KAPPA LC CSF-MCNC: 1.56 MG/DL
KAPPA LC SERPL-MCNC: 2.43 MG/DL
LDH SERPL-CCNC: 164 U/L
POTASSIUM SERPL-SCNC: 4.4 MMOL/L
PROT SERPL-MCNC: 6.9 G/DL
SODIUM SERPL-SCNC: 137 MMOL/L
URATE SERPL-MCNC: 5.4 MG/DL

## 2025-04-24 ENCOUNTER — RX RENEWAL (OUTPATIENT)
Age: 65
End: 2025-04-24

## 2025-05-15 ENCOUNTER — NON-APPOINTMENT (OUTPATIENT)
Age: 65
End: 2025-05-15

## 2025-05-15 ENCOUNTER — APPOINTMENT (OUTPATIENT)
Dept: FAMILY MEDICINE | Facility: CLINIC | Age: 65
End: 2025-05-15
Payer: COMMERCIAL

## 2025-05-15 VITALS
HEART RATE: 63 BPM | OXYGEN SATURATION: 96 % | RESPIRATION RATE: 14 BRPM | WEIGHT: 203 LBS | HEIGHT: 68 IN | BODY MASS INDEX: 30.77 KG/M2

## 2025-05-15 VITALS — SYSTOLIC BLOOD PRESSURE: 130 MMHG | DIASTOLIC BLOOD PRESSURE: 80 MMHG

## 2025-05-15 DIAGNOSIS — Z00.00 ENCOUNTER FOR GENERAL ADULT MEDICAL EXAMINATION W/OUT ABNORMAL FINDINGS: ICD-10-CM

## 2025-05-15 PROCEDURE — 00012S: CUSTOM

## 2025-05-15 PROCEDURE — 00009S: CUSTOM

## 2025-05-15 PROCEDURE — 00011S: CUSTOM

## 2025-05-15 PROCEDURE — 00035S: CUSTOM

## 2025-05-15 PROCEDURE — 00002S: CUSTOM

## 2025-06-10 NOTE — ED ADULT NURSE NOTE - CINV DISCH MEDS REVIEWED YN
There is no evidence of heart attack, blood clots on your lab and CT imaging. Your EKG is normal. The cause of your chest pain is most likely inflammatory, I recommend a course of naproxen for the next week to help reduce inflammation. Follow up with your PCP.     If you develop new or worsening symptoms, please return to the Emergency Department for further evaluation.     Yes

## 2025-07-08 ENCOUNTER — LABORATORY RESULT (OUTPATIENT)
Age: 65
End: 2025-07-08

## 2025-07-08 ENCOUNTER — APPOINTMENT (OUTPATIENT)
Dept: INTERNAL MEDICINE | Facility: CLINIC | Age: 65
End: 2025-07-08
Payer: MEDICARE

## 2025-07-08 VITALS
BODY MASS INDEX: 31.55 KG/M2 | SYSTOLIC BLOOD PRESSURE: 136 MMHG | WEIGHT: 201 LBS | DIASTOLIC BLOOD PRESSURE: 83 MMHG | HEIGHT: 67 IN | HEART RATE: 60 BPM | OXYGEN SATURATION: 97 %

## 2025-07-08 PROCEDURE — 36415 COLL VENOUS BLD VENIPUNCTURE: CPT

## 2025-07-08 PROCEDURE — G0402 INITIAL PREVENTIVE EXAM: CPT

## 2025-07-09 ENCOUNTER — APPOINTMENT (OUTPATIENT)
Dept: INTERNAL MEDICINE | Facility: CLINIC | Age: 65
End: 2025-07-09
Payer: MEDICARE

## 2025-07-09 LAB
ALBUMIN SERPL ELPH-MCNC: 4.4 G/DL
ALP BLD-CCNC: 100 U/L
ALT SERPL-CCNC: 25 U/L
ANION GAP SERPL CALC-SCNC: 15 MMOL/L
AST SERPL-CCNC: 27 U/L
BASOPHILS # BLD AUTO: 0.1 K/UL
BASOPHILS NFR BLD AUTO: 1.1 %
BILIRUB SERPL-MCNC: 0.4 MG/DL
BUN SERPL-MCNC: 17 MG/DL
CALCIUM SERPL-MCNC: 9.8 MG/DL
CHLORIDE SERPL-SCNC: 101 MMOL/L
CHOLEST SERPL-MCNC: 214 MG/DL
CO2 SERPL-SCNC: 19 MMOL/L
CREAT SERPL-MCNC: 1.08 MG/DL
EGFRCR SERPLBLD CKD-EPI 2021: 76 ML/MIN/1.73M2
EOSINOPHIL # BLD AUTO: 0.23 K/UL
EOSINOPHIL NFR BLD AUTO: 2.6 %
ESTIMATED AVERAGE GLUCOSE: 126 MG/DL
GLUCOSE SERPL-MCNC: 88 MG/DL
HBA1C MFR BLD HPLC: 6 %
HCT VFR BLD CALC: 49 %
HDLC SERPL-MCNC: 46 MG/DL
HGB BLD-MCNC: 15.9 G/DL
IMM GRANULOCYTES NFR BLD AUTO: 0.7 %
LDLC SERPL-MCNC: 123 MG/DL
LYMPHOCYTES # BLD AUTO: 2.31 K/UL
LYMPHOCYTES NFR BLD AUTO: 25.7 %
MAN DIFF?: NORMAL
MCHC RBC-ENTMCNC: 28.5 PG
MCHC RBC-ENTMCNC: 32.4 G/DL
MCV RBC AUTO: 87.8 FL
MONOCYTES # BLD AUTO: 0.86 K/UL
MONOCYTES NFR BLD AUTO: 9.6 %
NEUTROPHILS # BLD AUTO: 5.42 K/UL
NEUTROPHILS NFR BLD AUTO: 60.3 %
NONHDLC SERPL-MCNC: 167 MG/DL
PLATELET # BLD AUTO: 194 K/UL
POTASSIUM SERPL-SCNC: 4.4 MMOL/L
PROT SERPL-MCNC: 7.2 G/DL
PSA SERPL-MCNC: 4.06 NG/ML
RBC # BLD: 5.58 M/UL
RBC # FLD: 14.3 %
SODIUM SERPL-SCNC: 135 MMOL/L
T4 SERPL-MCNC: 7.3 UG/DL
TRIGL SERPL-MCNC: 250 MG/DL
TSH SERPL-ACNC: 4.44 UIU/ML
URATE SERPL-MCNC: 5.5 MG/DL
WBC # FLD AUTO: 8.98 K/UL

## 2025-07-09 PROCEDURE — G0009: CPT

## 2025-07-09 PROCEDURE — 90677 PCV20 VACCINE IM: CPT

## 2025-08-16 ENCOUNTER — NON-APPOINTMENT (OUTPATIENT)
Age: 65
End: 2025-08-16

## 2025-08-18 ENCOUNTER — APPOINTMENT (OUTPATIENT)
Dept: HEMATOLOGY ONCOLOGY | Facility: CLINIC | Age: 65
End: 2025-08-18
Payer: MEDICARE

## 2025-08-18 ENCOUNTER — RESULT REVIEW (OUTPATIENT)
Age: 65
End: 2025-08-18

## 2025-08-18 VITALS
BODY MASS INDEX: 31.63 KG/M2 | SYSTOLIC BLOOD PRESSURE: 120 MMHG | WEIGHT: 201.94 LBS | HEART RATE: 72 BPM | TEMPERATURE: 96.6 F | OXYGEN SATURATION: 97 % | RESPIRATION RATE: 16 BRPM | DIASTOLIC BLOOD PRESSURE: 70 MMHG

## 2025-08-18 DIAGNOSIS — Z51.11 ENCOUNTER FOR ANTINEOPLASTIC CHEMOTHERAPY: ICD-10-CM

## 2025-08-18 DIAGNOSIS — Z87.19 PERSONAL HISTORY OF OTHER DISEASES OF THE DIGESTIVE SYSTEM: ICD-10-CM

## 2025-08-18 PROCEDURE — 99213 OFFICE O/P EST LOW 20 MIN: CPT

## 2025-08-18 PROCEDURE — G2211 COMPLEX E/M VISIT ADD ON: CPT

## 2025-08-19 ENCOUNTER — APPOINTMENT (OUTPATIENT)
Dept: GASTROENTEROLOGY | Facility: CLINIC | Age: 65
End: 2025-08-19
Payer: MEDICARE

## 2025-08-19 VITALS
WEIGHT: 204 LBS | HEART RATE: 56 BPM | SYSTOLIC BLOOD PRESSURE: 137 MMHG | HEIGHT: 67 IN | OXYGEN SATURATION: 98 % | DIASTOLIC BLOOD PRESSURE: 87 MMHG | RESPIRATION RATE: 14 BRPM | BODY MASS INDEX: 32.02 KG/M2

## 2025-08-19 DIAGNOSIS — R79.89 OTHER SPECIFIED ABNORMAL FINDINGS OF BLOOD CHEMISTRY: ICD-10-CM

## 2025-08-19 DIAGNOSIS — K80.20 CALCULUS OF GALLBLADDER W/OUT CHOLECYSTITIS W/OUT OBSTRUCTION: ICD-10-CM

## 2025-08-19 DIAGNOSIS — D69.6 THROMBOCYTOPENIA, UNSPECIFIED: ICD-10-CM

## 2025-08-19 DIAGNOSIS — I10 ESSENTIAL (PRIMARY) HYPERTENSION: ICD-10-CM

## 2025-08-19 DIAGNOSIS — Z12.11 ENCOUNTER FOR SCREENING FOR MALIGNANT NEOPLASM OF COLON: ICD-10-CM

## 2025-08-19 DIAGNOSIS — I25.10 ATHEROSCLEROTIC HEART DISEASE OF NATIVE CORONARY ARTERY W/OUT ANGINA PECTORIS: ICD-10-CM

## 2025-08-19 DIAGNOSIS — K21.9 GASTRO-ESOPHAGEAL REFLUX DISEASE W/OUT ESOPHAGITIS: ICD-10-CM

## 2025-08-19 DIAGNOSIS — C91.10 CHRONIC LYMPHOCYTIC LEUKEMIA OF B-CELL TYPE NOT HAVING ACHIEVED REMISSION: ICD-10-CM

## 2025-08-19 DIAGNOSIS — E78.5 HYPERLIPIDEMIA, UNSPECIFIED: ICD-10-CM

## 2025-08-19 DIAGNOSIS — E83.39 OTHER DISORDERS OF PHOSPHORUS METABOLISM: ICD-10-CM

## 2025-08-19 DIAGNOSIS — N28.1 CYST OF KIDNEY, ACQUIRED: ICD-10-CM

## 2025-08-19 DIAGNOSIS — R97.20 ELEVATED PROSTATE, SPECIFIC ANTIGEN [PSA]: ICD-10-CM

## 2025-08-19 PROCEDURE — 99204 OFFICE O/P NEW MOD 45 MIN: CPT

## 2025-08-19 PROCEDURE — G2211 COMPLEX E/M VISIT ADD ON: CPT

## 2025-08-19 RX ORDER — SODIUM SULFATE, POTASSIUM SULFATE AND MAGNESIUM SULFATE 1.6; 3.13; 17.5 G/177ML; G/177ML; G/177ML
17.5-3.13-1.6 SOLUTION ORAL
Qty: 354 | Refills: 0 | Status: ACTIVE | COMMUNITY
Start: 2025-08-19 | End: 1900-01-01

## 2025-08-23 LAB
ALBUMIN SERPL ELPH-MCNC: 4.1 G/DL
ALP BLD-CCNC: 86 U/L
ALT SERPL-CCNC: 23 U/L
ANION GAP SERPL CALC-SCNC: 15 MMOL/L
AST SERPL-CCNC: 26 U/L
BILIRUB SERPL-MCNC: 0.4 MG/DL
BUN SERPL-MCNC: 16 MG/DL
CALCIUM SERPL-MCNC: 9.3 MG/DL
CHLORIDE SERPL-SCNC: 100 MMOL/L
CO2 SERPL-SCNC: 21 MMOL/L
CREAT SERPL-MCNC: 1.07 MG/DL
DEPRECATED KAPPA LC FREE/LAMBDA SER: 1.38 RATIO
EGFRCR SERPLBLD CKD-EPI 2021: 77 ML/MIN/1.73M2
GLUCOSE SERPL-MCNC: 117 MG/DL
IGA SERPL-MCNC: 219 MG/DL
IGG SERPL-MCNC: 1026 MG/DL
IGM SERPL-MCNC: 27 MG/DL
KAPPA LC CSF-MCNC: 1.58 MG/DL
KAPPA LC SERPL-MCNC: 2.18 MG/DL
LDH SERPL-CCNC: 199 U/L
POTASSIUM SERPL-SCNC: 4.1 MMOL/L
PROT SERPL-MCNC: 6.5 G/DL
SODIUM SERPL-SCNC: 136 MMOL/L
URATE SERPL-MCNC: 5.2 MG/DL

## (undated) DEVICE — GLV 7.5 SENSICARE W ALOE

## (undated) DEVICE — SUT VICRYL 4-0 18" PS-2 UNDYED

## (undated) DEVICE — XI SEAL UNIVERSIAL 5-12MM

## (undated) DEVICE — XI DRAPE COLUMN

## (undated) DEVICE — XI ARM FORCEP PROGRASP 8MM

## (undated) DEVICE — DRAPE XL SHEET 77X98"

## (undated) DEVICE — XI CORD MONOPOLAR CAUTERY (GREEN)

## (undated) DEVICE — SSH-ERBE 26004501: Type: DURABLE MEDICAL EQUIPMENT

## (undated) DEVICE — XI OBTURATOR OPTICAL BLADELESS 8MM

## (undated) DEVICE — XI ARM FORCE BIPOLAR 8MM

## (undated) DEVICE — XI CORD BIPOLAR CAUTERY (BLUE)

## (undated) DEVICE — XI DRAPE ARM

## (undated) DEVICE — ELCTR GROUNDING PAD ADULT COVIDIEN

## (undated) DEVICE — ENDOCATCH 10MM SPECIMEN POUCH

## (undated) DEVICE — ELCTR BOVIE PENCIL SMOKE EVACUATION 15FT

## (undated) DEVICE — SOL IRR POUR H2O 1000ML

## (undated) DEVICE — SOL IRR POUR NS 0.9% 1000ML

## (undated) DEVICE — STAPLER SKIN PROXIMATE

## (undated) DEVICE — VENODYNE/SCD SLEEVE CALF MEDIUM

## (undated) DEVICE — XI ARM PERMANENT CAUTERY HOOK

## (undated) DEVICE — PACK ROBOTIC

## (undated) DEVICE — TUBING STRYKEFLOW II SUCTION / IRRIGATOR

## (undated) DEVICE — DRAPE TOWEL BLUE STICKY

## (undated) DEVICE — XI ENDOWRIST SUCTION IRRIGATOR 8MM

## (undated) DEVICE — XI ARM CLIP APPLIER LARGE

## (undated) DEVICE — SUT VICRYL 0 27" CT-2 UNDYED

## (undated) DEVICE — ELCTR BOVIE PENCIL BLADE 10FT

## (undated) DEVICE — DRAPE GENERAL ENDOSCOPY

## (undated) DEVICE — TROCAR COVIDIEN VERSAONE BLUNT TIP HASSAN 12MM